# Patient Record
Sex: MALE | Race: WHITE | Employment: OTHER | URBAN - METROPOLITAN AREA
[De-identification: names, ages, dates, MRNs, and addresses within clinical notes are randomized per-mention and may not be internally consistent; named-entity substitution may affect disease eponyms.]

---

## 2017-04-17 ENCOUNTER — HOSPITAL ENCOUNTER (OUTPATIENT)
Dept: OTHER | Age: 72
Discharge: HOME | End: 2017-04-30
Attending: INTERNAL MEDICINE | Admitting: INTERNAL MEDICINE

## 2017-04-21 ENCOUNTER — HOSPITAL ENCOUNTER (OUTPATIENT)
Dept: PHYSICAL THERAPY | Age: 72
Discharge: HOME | End: 2017-04-22
Admitting: INTERNAL MEDICINE

## 2017-04-25 ENCOUNTER — HOSPITAL ENCOUNTER (OUTPATIENT)
Dept: PHYSICAL THERAPY | Age: 72
Discharge: HOME | End: 2017-04-26
Admitting: INTERNAL MEDICINE

## 2017-04-27 ENCOUNTER — HOSPITAL ENCOUNTER (OUTPATIENT)
Dept: PHYSICAL THERAPY | Age: 72
Discharge: HOME | End: 2017-04-28
Admitting: INTERNAL MEDICINE

## 2017-05-01 ENCOUNTER — HOSPITAL ENCOUNTER (OUTPATIENT)
Dept: OTHER | Age: 72
Discharge: HOME | End: 2017-05-19
Attending: INTERNAL MEDICINE | Admitting: INTERNAL MEDICINE

## 2017-05-03 ENCOUNTER — HOSPITAL ENCOUNTER (OUTPATIENT)
Dept: PHYSICAL THERAPY | Age: 72
Discharge: HOME | End: 2017-05-04
Admitting: INTERNAL MEDICINE

## 2017-05-05 ENCOUNTER — HOSPITAL ENCOUNTER (OUTPATIENT)
Dept: PHYSICAL THERAPY | Age: 72
Discharge: HOME | End: 2017-05-06
Admitting: INTERNAL MEDICINE

## 2017-05-09 ENCOUNTER — HOSPITAL ENCOUNTER (OUTPATIENT)
Dept: PHYSICAL THERAPY | Age: 72
Discharge: HOME | End: 2017-05-10
Admitting: INTERNAL MEDICINE

## 2017-05-11 ENCOUNTER — HOSPITAL ENCOUNTER (OUTPATIENT)
Dept: PHYSICAL THERAPY | Age: 72
Discharge: HOME | End: 2017-05-12
Admitting: INTERNAL MEDICINE

## 2017-05-20 ENCOUNTER — HOSPITAL ENCOUNTER (OUTPATIENT)
Dept: OTHER | Age: 72
Discharge: HOME | End: 2017-05-31
Attending: INTERNAL MEDICINE | Admitting: INTERNAL MEDICINE

## 2017-06-07 ENCOUNTER — HOSPITAL ENCOUNTER (OUTPATIENT)
Dept: PHYSICAL THERAPY | Age: 72
Discharge: HOME | End: 2017-06-08
Admitting: INTERNAL MEDICINE

## 2017-06-14 ENCOUNTER — HOSPITAL ENCOUNTER (OUTPATIENT)
Dept: PHYSICAL THERAPY | Age: 72
Discharge: HOME | End: 2017-06-15
Admitting: INTERNAL MEDICINE

## 2017-06-16 ENCOUNTER — HOSPITAL ENCOUNTER (OUTPATIENT)
Dept: PHYSICAL THERAPY | Age: 72
Discharge: HOME | End: 2017-06-17
Admitting: INTERNAL MEDICINE

## 2017-06-20 ENCOUNTER — HOSPITAL ENCOUNTER (OUTPATIENT)
Dept: PHYSICAL THERAPY | Age: 72
Discharge: HOME | End: 2017-06-21
Admitting: INTERNAL MEDICINE

## 2021-01-27 ENCOUNTER — IMMUNIZATION (OUTPATIENT)
Dept: PRIMARY CARE CLINIC | Age: 76
End: 2021-01-27
Payer: MEDICARE

## 2021-01-27 PROCEDURE — 0001A COVID-19, PFIZER VACCINE 30MCG/0.3ML DOSE: CPT | Performed by: FAMILY MEDICINE

## 2021-01-27 PROCEDURE — 91300 COVID-19, PFIZER VACCINE 30MCG/0.3ML DOSE: CPT | Performed by: FAMILY MEDICINE

## 2021-02-17 ENCOUNTER — IMMUNIZATION (OUTPATIENT)
Dept: PRIMARY CARE CLINIC | Age: 76
End: 2021-02-17
Payer: MEDICARE

## 2021-02-17 PROCEDURE — 0002A COVID-19, PFIZER VACCINE 30MCG/0.3ML DOSE: CPT | Performed by: FAMILY MEDICINE

## 2021-02-17 PROCEDURE — 91300 COVID-19, PFIZER VACCINE 30MCG/0.3ML DOSE: CPT | Performed by: FAMILY MEDICINE

## 2021-08-04 ENCOUNTER — APPOINTMENT (OUTPATIENT)
Dept: CT IMAGING | Age: 76
End: 2021-08-04
Payer: MEDICARE

## 2021-08-04 ENCOUNTER — HOSPITAL ENCOUNTER (EMERGENCY)
Age: 76
Discharge: ANOTHER HOSPITAL | End: 2021-08-05
Attending: EMERGENCY MEDICINE
Payer: MEDICARE

## 2021-08-04 VITALS
HEIGHT: 70 IN | SYSTOLIC BLOOD PRESSURE: 136 MMHG | OXYGEN SATURATION: 99 % | HEART RATE: 87 BPM | WEIGHT: 211.86 LBS | RESPIRATION RATE: 17 BRPM | TEMPERATURE: 98.1 F | DIASTOLIC BLOOD PRESSURE: 82 MMHG | BODY MASS INDEX: 30.33 KG/M2

## 2021-08-04 DIAGNOSIS — S06.5XAA SUBDURAL HEMATOMA: Primary | ICD-10-CM

## 2021-08-04 LAB
A/G RATIO: 1.2 (ref 1.1–2.2)
ALBUMIN SERPL-MCNC: 3.6 G/DL (ref 3.4–5)
ALP BLD-CCNC: 128 U/L (ref 40–129)
ALT SERPL-CCNC: 12 U/L (ref 10–40)
ANION GAP SERPL CALCULATED.3IONS-SCNC: 13 MMOL/L (ref 3–16)
APTT: 36.7 SEC (ref 26.2–38.6)
AST SERPL-CCNC: 15 U/L (ref 15–37)
BASOPHILS ABSOLUTE: 0 K/UL (ref 0–0.2)
BASOPHILS RELATIVE PERCENT: 0.6 %
BILIRUB SERPL-MCNC: 0.6 MG/DL (ref 0–1)
BUN BLDV-MCNC: 15 MG/DL (ref 7–20)
CALCIUM SERPL-MCNC: 8.8 MG/DL (ref 8.3–10.6)
CHLORIDE BLD-SCNC: 99 MMOL/L (ref 99–110)
CO2: 25 MMOL/L (ref 21–32)
CREAT SERPL-MCNC: 1.2 MG/DL (ref 0.8–1.3)
EOSINOPHILS ABSOLUTE: 0 K/UL (ref 0–0.6)
EOSINOPHILS RELATIVE PERCENT: 0.3 %
GFR AFRICAN AMERICAN: >60
GFR NON-AFRICAN AMERICAN: 59
GLOBULIN: 3 G/DL
GLUCOSE BLD-MCNC: 183 MG/DL (ref 70–99)
GLUCOSE BLD-MCNC: 197 MG/DL (ref 70–99)
HCT VFR BLD CALC: 39.1 % (ref 40.5–52.5)
HEMOGLOBIN: 12.7 G/DL (ref 13.5–17.5)
INR BLD: 1.44 (ref 0.88–1.12)
LYMPHOCYTES ABSOLUTE: 1.2 K/UL (ref 1–5.1)
LYMPHOCYTES RELATIVE PERCENT: 14.6 %
MCH RBC QN AUTO: 26.8 PG (ref 26–34)
MCHC RBC AUTO-ENTMCNC: 32.4 G/DL (ref 31–36)
MCV RBC AUTO: 83 FL (ref 80–100)
MONOCYTES ABSOLUTE: 0.4 K/UL (ref 0–1.3)
MONOCYTES RELATIVE PERCENT: 5.3 %
NEUTROPHILS ABSOLUTE: 6.4 K/UL (ref 1.7–7.7)
NEUTROPHILS RELATIVE PERCENT: 79.2 %
PDW BLD-RTO: 14.6 % (ref 12.4–15.4)
PERFORMED ON: ABNORMAL
PLATELET # BLD: 142 K/UL (ref 135–450)
PMV BLD AUTO: 8.9 FL (ref 5–10.5)
POTASSIUM REFLEX MAGNESIUM: 4.6 MMOL/L (ref 3.5–5.1)
PROTHROMBIN TIME: 16.5 SEC (ref 9.9–12.7)
RBC # BLD: 4.72 M/UL (ref 4.2–5.9)
SODIUM BLD-SCNC: 137 MMOL/L (ref 136–145)
TOTAL PROTEIN: 6.6 G/DL (ref 6.4–8.2)
WBC # BLD: 8.1 K/UL (ref 4–11)

## 2021-08-04 PROCEDURE — 96375 TX/PRO/DX INJ NEW DRUG ADDON: CPT

## 2021-08-04 PROCEDURE — 93005 ELECTROCARDIOGRAM TRACING: CPT | Performed by: EMERGENCY MEDICINE

## 2021-08-04 PROCEDURE — 2580000003 HC RX 258: Performed by: EMERGENCY MEDICINE

## 2021-08-04 PROCEDURE — 70450 CT HEAD/BRAIN W/O DYE: CPT

## 2021-08-04 PROCEDURE — 80053 COMPREHEN METABOLIC PANEL: CPT

## 2021-08-04 PROCEDURE — 99285 EMERGENCY DEPT VISIT HI MDM: CPT

## 2021-08-04 PROCEDURE — 85025 COMPLETE CBC W/AUTO DIFF WBC: CPT

## 2021-08-04 PROCEDURE — 85730 THROMBOPLASTIN TIME PARTIAL: CPT

## 2021-08-04 PROCEDURE — 96365 THER/PROPH/DIAG IV INF INIT: CPT

## 2021-08-04 PROCEDURE — 6360000002 HC RX W HCPCS: Performed by: EMERGENCY MEDICINE

## 2021-08-04 PROCEDURE — 85610 PROTHROMBIN TIME: CPT

## 2021-08-04 RX ORDER — LISINOPRIL 10 MG/1
10 TABLET ORAL DAILY
Status: ON HOLD | COMMUNITY
Start: 2021-03-15 | End: 2021-08-10 | Stop reason: HOSPADM

## 2021-08-04 RX ORDER — TAMSULOSIN HYDROCHLORIDE 0.4 MG/1
0.4 CAPSULE ORAL NIGHTLY
COMMUNITY
Start: 2021-05-17

## 2021-08-04 RX ORDER — CARBOXYMETHYLCELLULOSE SODIUM 10 MG/ML
1 GEL OPHTHALMIC 3 TIMES DAILY PRN
COMMUNITY
Start: 2021-02-04

## 2021-08-04 RX ORDER — SODIUM CHLORIDE 9 MG/ML
50 INJECTION, SOLUTION INTRAVENOUS ONCE
Status: DISCONTINUED | OUTPATIENT
Start: 2021-08-04 | End: 2021-08-05 | Stop reason: HOSPADM

## 2021-08-04 RX ORDER — INSULIN GLARGINE 100 [IU]/ML
14 INJECTION, SOLUTION SUBCUTANEOUS NIGHTLY
COMMUNITY

## 2021-08-04 RX ADMIN — LEVETIRACETAM 1500 MG: 100 INJECTION, SOLUTION INTRAVENOUS at 15:13

## 2021-08-04 RX ADMIN — Medication 4776 UNITS: at 15:35

## 2021-08-04 ASSESSMENT — PAIN SCALES - GENERAL
PAINLEVEL_OUTOF10: 0

## 2021-08-04 NOTE — ED NOTES
Pt to ED via [facility] with slurred speech and left side facial droop. Per EMS report, pt was last seen normal at 1330 today. EMS state pt had a seizure that lasted approx 2 min prior to their arrival, witnessed by wife. ED MD Hans Rubin at bedside. Code stroke activated.        Augie Penn, RN  08/04/21 [facility], RN  08/04/21 3814

## 2021-08-04 NOTE — ED NOTES
Attempted to notify wife Matthew Ulrich, at this time with patient's approval. Patient's wife unable to be reached.      Elias Trujillo, MERCEDES  08/04/21 8978

## 2021-08-04 NOTE — ED NOTES
Report given to receiving MERCEDES Blanton, all questions answered.       Carolynn Rock RN  08/04/21 5062

## 2021-08-04 NOTE — PLAN OF CARE
NEUROSURGERY PROGRESS NOTE    Araeclis Umaña  [id number]   1945 8/4/2021    ED physician: Dr. Lois Cardona MD    Reason for call: SDH    History of present illness: Patient is a 77 y.o. male that  has a past medical history of Arthritis, Brain tumor ([address] (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 Patient presented on 8/4/2021 to [facility] ED c/o headache. According to Dr. Jeffrey Mercer, the patient is a 71-year-old male with history of brain tumor, diabetes, DVT, hypertension who presents for headache, seizure, facial droop and dysarthria which began approximately 45 minutes prior to arrival.  Patient states he woke up after a nap around 145 with a headache. Called EMS and had one seizure prior to EMS arrival.  No history of seizures. Not on any antiepileptics. 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 Patient does report that he takes Eliquis. Has not missed any doses. Patient has fallen mechanically several times last one was 3 to 4 days ago without loss of consciousness or any other injury. Review of systems unremarkable at this time. Denies any fever, nausea, vomiting, diarrhea, chest pain, shortness of breath, dysuria, hematuria, back pain. Physical Exam:     /85   Pulse 75   Temp 98.5 °F (36.9 °C) (Oral)   Resp 14   Ht 5' 10\" (1.778 m)   Wt 211 lb 13.8 oz (96.1 kg)   SpO2 96%   BMI 30.40 kg/m²     GCS per ED physician:  4 - Opens eyes on own  5 - Alert and oriented  6 - Follows simple motor commands    Full left-sided facial droop, dysarthria, and left finger-to-nose with dysmetria    Radiological Findings:  CT HEAD WO CONTRAST  Result Date: 8/4/2021  Large right subdural hematoma with mixed attenuation suggesting acute on chronic or active bleeding. There is 7 mm right to left midline shift.  Remote left cerebellar infarct. Assessment:  Patient is a 77 y.o. y/o male w/headache 2/2 acute on chronic SDH    Recommendations:  1. Neurologic exams frequency:  - ICU: Q1H  - Floor: Q4H, if repeat CT Head stable  2. For change in exam MUST contact neurosurgery team  3. CT Head w/o contrast 6 hours from previous scan  4. Maintain SBP <160; If PRN med insufficient, then may start Nicardipine infusion  5. Keep Plt >100k & INR <1.4  6. Hold all anticoagulation & antiplatelet for 2 weeks  7. DVT Prophylaxis: SCD's  8. Seizure prophylaxis: Keppra 1000 mg BID  9. Detailed consult when patient arrives. DISPO: Transfer to [facility] with hospitalist as attending physician and Dr. Patricio Darling as consulting neurosurgeon.      Electronically signed by: KRISTAL Denson CNP, APRN-CNP, 8/4/2021 4:06 PM  [phone]

## 2021-08-04 NOTE — ED PROVIDER NOTES
[address] [facility] [place]      Pt Name: Armand Nelson  MRN: [id number]  [place] 1945  Date ofevaluation: 8/4/2021  Provider: Sofía Talbert MD    CHIEF COMPLAINT       Chief Complaint   Patient presents with    Facial Droop     Pt to ED via [facility] with slurred speech and left side facial droop. Per EMS report, pt was last seen normal at 1330 today. EMS state pt had a seizure that lasted approx 2 min prior to their arrival, witnessed by wife. HISTORY OF PRESENT ILLNESS   (Location/Symptom, Timing/Onset,Context/Setting, Quality, Duration, Modifying Factors, Severity)  Note limiting factors. Armand Nelson is a 77 y.o. male  who  has a past medical history of Arthritis, Brain tumor [address] [address] Cancer ([facility] Deaf, Diabetes ([facility] [address] [facility] Diabetes mellitus ([facility] [address] [facility] Endocarditis, High blood pressure, and Peptic ulcer. 76-year-old male with history of brain tumor, diabetes, DVT, hypertension who presents for headache, seizure, facial droop and dysarthria which began approximately 45 minutes prior to arrival.  Patient states he woke up after a nap around 145 with a headache. Called EMS and had one seizure prior to EMS arrival.  No history of seizures. Not on any antiepileptics. 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 Patient does report that he takes Eliquis. Has not missed any doses. Patient has fallen mechanically several times last one was 3 to 4 days ago without loss of consciousness or any other injury. Review of systems unremarkable at this time. Denies any fever, nausea, vomiting, diarrhea, chest pain, shortness of breath, dysuria, hematuria, back pain. The history is provided by the patient, the EMS personnel, the spouse and a relative. Neurologic Problem  Primary symptoms include speech change. Primary symptoms comment: Seizure.  This is a new problem. The current episode started less than 1 hour ago. The problem has not changed since onset. There was left facial focality noted. There has been no fever. Associated symptoms include headaches. There were no medications administered prior to arrival. Associated medical issues include seizures. NursingNotes were reviewed. REVIEW OF SYSTEMS    (2-9 systems for level 4, 10 or more for level 5)     Review of Systems   Neurological: Positive for speech change and headaches. Except as noted above the remainder of the review of systems was reviewed and negative. 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    Diabetes ([address]     Diabetes mellitus ([address]     Endocarditis late 1990's    High blood pressure     Peptic ulcer 2013    stopped aspirin         SURGICALHISTORY       Past Surgical History:   Procedure Laterality Date    APPENDECTOMY  age 3    CARDIAC SURGERY      [facility]    [facility]    UPPER GASTROINTESTINAL ENDOSCOPY  2013         CURRENT MEDICATIONS       Previous Medications    APIXABAN (ELIQUIS) 5 MG TABS TABLET    Take 5 mg by mouth 2 times daily    CARBOXYMETHYLCELLULOSE (THERATEARS) 1 % OPHTHALMIC GEL    Place 1 drop into the left eye 3 times daily as needed for Dry Eyes    DICYCLOMINE (BENTYL) 10 MG CAPSULE    Take 10 mg by mouth 4 times daily (before meals and nightly)     FUROSEMIDE (LASIX) 20 MG TABLET    Take 20 mg by mouth daily. INSULIN GLARGINE (LANTUS) 100 UNIT/ML INJECTION VIAL    Inject 14 Units into the skin nightly    LISINOPRIL (PRINIVIL;ZESTRIL) 10 MG TABLET    Take 10 mg by mouth daily    OMEPRAZOLE (PRILOSEC) 40 MG CAPSULE    Take 40 mg by mouth daily.     PRAVASTATIN (PRAVACHOL) 80 MG TABLET    Take 40 mg by mouth nightly     TAMSULOSIN (FLOMAX) 0.4 MG CAPSULE    Take 0.4 mg by mouth nightly            Iv contrast [iodides] and Sulfa antibiotics    FAMILY HISTORY       Family History   Problem Relation Age of Onset    Diabetes Mother     Cancer Father         bone    Arthritis Other     Diabetes Other     High Blood Pressure Other           SOCIAL HISTORY       Social History     Socioeconomic History    Marital status:      Spouse name: None    Number of children: None    Years of education: None    Highest education level: None   Occupational History    None   Tobacco Use    Smoking status: Former Smoker     Years: 2.00     Quit date: 1960     Years since quittin.3    Smokeless tobacco: Never Used   Substance and Sexual Activity    Alcohol use: Yes     Comment: [place]    Drug use: No    Sexual activity: Not Currently   Other Topics Concern    None   Social History Narrative    None     Social Determinants of Health     Financial Resource Strain:     Difficulty of Paying Living Expenses:    Food Insecurity:     Worried About Running Out of Food in the Last Year:     Ran Out of Food in the Last Year:    Transportation Needs:     Lack of Transportation (Medical):      Lack of Transportation (Non-Medical):    Physical Activity:     Days of Exercise per Week:     Minutes of Exercise per Session:    Stress:     Feeling of Stress :    Social Connections:     Frequency of Communication with Friends and Family:     Frequency of Social Gatherings with Friends and Family:     Attends Temple Services:     Active Member of Clubs or Organizations:     Attends Club or Organization Meetings:     Marital Status:    Intimate Partner Violence:     Fear of Current or Ex-Partner:     Emotionally Abused:     Physically Abused:     Sexually Abused:        SCREENINGS   NIH Stroke Scale  Interval: Baseline  Level of Consciousness (1a. ): Alert  LOC Questions (1b. ): Answers one correctly  LOC Commands (1c. ): Performs both tasks correctly  Best Gaze (2. ): (!) Partial gaze palsy  Visual (3. ): No visual loss  Facial Palsy (4. ): (!) Complete paralysis of one or both sides  Motor Arm, Left (5a. ): No drift  Motor Arm, Right (5b. ): No drift  Motor Leg, Left (6a. ): No drift  Motor Leg, Right (6b. ): No drift  Limb Ataxia (7. ): Absent  Sensory (8. ): Normal  Best Language (9. ): No aphasia  Dysarthria (10. ): Severe, unintelligible slurring or mute  Extinction and Inattention (11): No abnormality  Total: 7Glasgow Coma Scale  Eye Opening: Spontaneous  Best Verbal Response: Oriented  Best Motor Response: Obeys commands  Mustang Coma Scale Score: 15        PHYSICAL EXAM    (up to 7 for level 4, 8 or more for level 5)     ED Triage Vitals [08/04/21 1437]   BP Temp Temp Source Pulse Resp SpO2 Height Weight   (!) 149/90 98.5 °F (36.9 °C) Oral 86 19 93 % 5' 10\" (1.778 m) 211 lb 13.8 oz (96.1 kg)       Physical Exam  Vitals and nursing note reviewed. Constitutional:       General: He is not in acute distress. Appearance: He is well-developed and normal weight. He is not ill-appearing, toxic-appearing or diaphoretic. HENT:      Head: Normocephalic and atraumatic. Mouth/Throat:      Mouth: Mucous membranes are moist.      Pharynx: Oropharynx is clear. Eyes:      Extraocular Movements: Extraocular movements intact. Cardiovascular:      Rate and Rhythm: Normal rate and regular rhythm. Pulses: Normal pulses. Heart sounds: Normal heart sounds. Pulmonary:      Effort: Pulmonary effort is normal.      Breath sounds: Normal breath sounds. No decreased breath sounds, wheezing, rhonchi or rales. Chest:      Chest wall: No tenderness. Abdominal:      General: Bowel sounds are normal.      Palpations: Abdomen is soft. Tenderness: There is no abdominal tenderness. Musculoskeletal:         General: Normal range of motion.       Cervical back: Normal range of midline shift. Remote left cerebellar infarct. Findings were discussed with Consuelo Smith at 2:43 pm on 8/4/2021. ED BEDSIDE ULTRASOUND:   Performed by ED Physician - none    LABS:  Labs Reviewed   CBC WITH AUTO DIFFERENTIAL - Abnormal; Notable for the following components:       Result Value    Hemoglobin 12.7 (*)     Hematocrit 39.1 (*)     All other components within normal limits    Narrative:     Performed at:  [facility]  [address] [facility] [address]   Phone [phone]   COMPREHENSIVE METABOLIC PANEL W/ REFLEX TO MG FOR LOW K - Abnormal; Notable for the following components:    Glucose 197 (*)     GFR Non- 59 (*)     All other components within normal limits    Narrative:     Performed at:  [facility]  [address] [facility] [address]   Phone [phone]   PROTIME-INR - Abnormal; Notable for the following components:    Protime 16.5 (*)     INR 1.44 (*)     All other components within normal limits    Narrative:     Performed at:  [address] [facility] [address]   Phone [phone]   POCT GLUCOSE - Abnormal; Notable for the following components:    POC Glucose 183 (*)     All other components within normal limits    Narrative:     Performed at:  [facility]  [address] [facility] [address]   Phone [phone]   APTT    Narrative:     Performed at:  [address] [place] [address] Laboratory  [address] [place] [address] [facility]   Phone [phone]       All other labs were within normal range or not returned as of this dictation.     EMERGENCY DEPARTMENT COURSE and DIFFERENTIAL DIAGNOSIS/MDM:   Vitals:    Vitals:    08/04/21 1437 08/04/21 1446 08/04/21 1502 08/04/21 1532   BP: (!) 149/90 (!) 145/89 136/87 132/85   Pulse: 86 80 78 75   Resp: 19 26 12 14   Temp: treatment included: See above  . INITIAL NIH STROKE SCALE    Time Performed: 2:30 PM  Administer stroke scale items in the order listed. Record performance in each category after each subscale exam. Do not go back and change scores. Follow directions provided for each exam technique. Scores should reflect what the patient does, not what the clinician thinks the patient can do. The clinician should record answers while administering the exam and work quickly. Except where indicated, the patient should not be coached (i.e., repeated requests to patient to make a special effort). 1a.  Level of consciousness:  0 - alert; keenly responsive  1b. Level of consciousness questions:  1 - answers one question correctly  1c. Level of consciousness questions:  0 - performs both tasks correctly  2. Best Gaze:  1 - partial gaze palsy  3. Visual:  0 - no visual loss  4. Facial Palsy:  2 - partial paralysis (total or near total paralysis of the lower face)  5a. Motor left arm:  0 - no drift, limb holds 90 (or 45) degrees for full 10 seconds  5b. Motor right arm:  0 - no drift, limb holds 90 (or 45) degrees for full 10 seconds  6a. Motor left le - no drift; leg holds 30 degree position for full 5 seconds  6b. Motor right le - no drift; leg holds 30 degree position for full 5 seconds  7. Limb Ataxia:  1 - present in one limb  8. Sensory:  0 - normal; no sensory loss  9. Best Language:  0  10. Dysarthria:  2 - severe; patient speech is so slurred as to be unintelligible in the absence of or our of proportion to any dysphagia, or is mute/anarthric   11. Extinction and Inattention:  0 - no abnormality    TOTAL:  8      REASSESSMENT       2:30 PM  Patient arrived with facial droop and headache. Rushed to CT scanner    2:40 PM  Seen subdural hematoma. Spoke with Dr. Addie Osler at [facility] stroke team.  Patient not a TPA candidate at this time given acute head bleed.   Will consult neurosurgical team.  Patient takes Eliquis will order Kcentra as well as Keppra. His bed to be maintained at 30 degrees. Patient's blood pressure at this time nonconcerning but will maintain below 845 systolic and give nicardipine if necessary. 3:18 PM  [facility] team.  Patient excepted for transfer again agree with recommendations for reversal, blood pressure control. He also recommends repeat head CT in 6 hours if patient still in our ER or emergently with any clinical change. 3:40 PM  [facility] who [facility]. Will speak with ICU team and arrange transport. 3:47 PM  Neuro status unchanged. CRITICAL CARE TIME   Total Critical Care time was 20 minutes, excluding separately reportable procedures. There was a high probability of clinically significant/life threatening deterioration in the patient's condition which required my urgent intervention. CONSULTS:  IP CONSULT TO NEUROSURGERY  IP CONSULT TO STROKE TEAM    PROCEDURES:  Unless otherwise noted below, none     Procedures    FINAL IMPRESSION      1. Subdural hematoma ([address]          DISPOSITION/PLAN   DISPOSITION    Transfer to outside hospital    PATIENT REFERREDTO:  No follow-up provider specified.     DISCHARGEMEDICATIONS:  New Prescriptions    No medications on file          (Please note that portions of this note were completed with a voice recognition program.  Efforts were made to edit the dictations but occasionally words are mis-transcribed.)    Dakota Taylor MD (electronically signed)  Attending Emergency Physician         Dakota Taylor MD  08/04/21 9581

## 2021-08-05 ENCOUNTER — APPOINTMENT (OUTPATIENT)
Dept: CT IMAGING | Age: 76
DRG: 025 | End: 2021-08-05
Attending: INTERNAL MEDICINE
Payer: MEDICARE

## 2021-08-05 ENCOUNTER — HOSPITAL ENCOUNTER (INPATIENT)
Age: 76
LOS: 6 days | Discharge: NURSING, REHAB OR LONG-TERM CARE | DRG: 025 | End: 2021-08-11
Attending: INTERNAL MEDICINE | Admitting: INTERNAL MEDICINE
Payer: MEDICARE

## 2021-08-05 ENCOUNTER — ANESTHESIA EVENT (OUTPATIENT)
Dept: OPERATING ROOM | Age: 76
DRG: 025 | End: 2021-08-05
Payer: MEDICARE

## 2021-08-05 ENCOUNTER — ANESTHESIA (OUTPATIENT)
Dept: OPERATING ROOM | Age: 76
DRG: 025 | End: 2021-08-05
Payer: MEDICARE

## 2021-08-05 VITALS — TEMPERATURE: 86.9 F | DIASTOLIC BLOOD PRESSURE: 66 MMHG | SYSTOLIC BLOOD PRESSURE: 127 MMHG | OXYGEN SATURATION: 100 %

## 2021-08-05 LAB
ABO/RH: NORMAL
ANION GAP SERPL CALCULATED.3IONS-SCNC: 9 MMOL/L (ref 3–16)
ANTIBODY SCREEN: NORMAL
BUN BLDV-MCNC: 13 MG/DL (ref 7–20)
CALCIUM SERPL-MCNC: 9.1 MG/DL (ref 8.3–10.6)
CHLORIDE BLD-SCNC: 105 MMOL/L (ref 99–110)
CHOLESTEROL, TOTAL: 123 MG/DL (ref 0–199)
CO2: 27 MMOL/L (ref 21–32)
CREAT SERPL-MCNC: 1 MG/DL (ref 0.8–1.3)
EKG ATRIAL RATE: 394 BPM
EKG DIAGNOSIS: NORMAL
EKG Q-T INTERVAL: 382 MS
EKG QRS DURATION: 78 MS
EKG QTC CALCULATION (BAZETT): 443 MS
EKG R AXIS: -15 DEGREES
EKG T AXIS: 29 DEGREES
EKG VENTRICULAR RATE: 81 BPM
GFR AFRICAN AMERICAN: >60
GFR NON-AFRICAN AMERICAN: >60
GLUCOSE BLD-MCNC: 104 MG/DL (ref 70–99)
GLUCOSE BLD-MCNC: 124 MG/DL (ref 70–99)
GLUCOSE BLD-MCNC: 127 MG/DL (ref 70–99)
GLUCOSE BLD-MCNC: 145 MG/DL (ref 70–99)
GLUCOSE BLD-MCNC: 146 MG/DL (ref 70–99)
HCT VFR BLD CALC: 38.7 % (ref 40.5–52.5)
HDLC SERPL-MCNC: 37 MG/DL (ref 40–60)
HEMOGLOBIN: 12.7 G/DL (ref 13.5–17.5)
INR BLD: 1.28 (ref 0.88–1.12)
LDL CHOLESTEROL CALCULATED: 73 MG/DL
MCH RBC QN AUTO: 27.7 PG (ref 26–34)
MCHC RBC AUTO-ENTMCNC: 32.8 G/DL (ref 31–36)
MCV RBC AUTO: 84.6 FL (ref 80–100)
PDW BLD-RTO: 14.7 % (ref 12.4–15.4)
PERFORMED ON: ABNORMAL
PLATELET # BLD: 122 K/UL (ref 135–450)
PMV BLD AUTO: 8.6 FL (ref 5–10.5)
POTASSIUM SERPL-SCNC: 4.6 MMOL/L (ref 3.5–5.1)
PROTHROMBIN TIME: 14.6 SEC (ref 9.9–12.7)
RBC # BLD: 4.57 M/UL (ref 4.2–5.9)
REASON FOR REJECTION: NORMAL
REJECTED TEST: NORMAL
SODIUM BLD-SCNC: 141 MMOL/L (ref 136–145)
TRIGL SERPL-MCNC: 65 MG/DL (ref 0–150)
VLDLC SERPL CALC-MCNC: 13 MG/DL
WBC # BLD: 7.9 K/UL (ref 4–11)

## 2021-08-05 PROCEDURE — 93010 ELECTROCARDIOGRAM REPORT: CPT | Performed by: INTERNAL MEDICINE

## 2021-08-05 PROCEDURE — 80061 LIPID PANEL: CPT

## 2021-08-05 PROCEDURE — 6370000000 HC RX 637 (ALT 250 FOR IP): Performed by: PHYSICIAN ASSISTANT

## 2021-08-05 PROCEDURE — 2720000010 HC SURG SUPPLY STERILE: Performed by: NEUROLOGICAL SURGERY

## 2021-08-05 PROCEDURE — 80048 BASIC METABOLIC PNL TOTAL CA: CPT

## 2021-08-05 PROCEDURE — 86901 BLOOD TYPING SEROLOGIC RH(D): CPT

## 2021-08-05 PROCEDURE — 2500000003 HC RX 250 WO HCPCS: Performed by: NURSE ANESTHETIST, CERTIFIED REGISTERED

## 2021-08-05 PROCEDURE — 6360000002 HC RX W HCPCS: Performed by: PHYSICIAN ASSISTANT

## 2021-08-05 PROCEDURE — 00P60JZ REMOVAL OF SYNTHETIC SUBSTITUTE FROM CEREBRAL VENTRICLE, OPEN APPROACH: ICD-10-PCS | Performed by: NEUROLOGICAL SURGERY

## 2021-08-05 PROCEDURE — 2780000010 HC IMPLANT OTHER: Performed by: NEUROLOGICAL SURGERY

## 2021-08-05 PROCEDURE — C1713 ANCHOR/SCREW BN/BN,TIS/BN: HCPCS | Performed by: NEUROLOGICAL SURGERY

## 2021-08-05 PROCEDURE — 6360000002 HC RX W HCPCS: Performed by: NEUROLOGICAL SURGERY

## 2021-08-05 PROCEDURE — 6370000000 HC RX 637 (ALT 250 FOR IP): Performed by: INTERNAL MEDICINE

## 2021-08-05 PROCEDURE — 3600000014 HC SURGERY LEVEL 4 ADDTL 15MIN: Performed by: NEUROLOGICAL SURGERY

## 2021-08-05 PROCEDURE — 70450 CT HEAD/BRAIN W/O DYE: CPT

## 2021-08-05 PROCEDURE — 86900 BLOOD TYPING SEROLOGIC ABO: CPT

## 2021-08-05 PROCEDURE — 00160J6 BYPASS CEREBRAL VENTRICLE TO PERITONEAL CAVITY WITH SYNTHETIC SUBSTITUTE, OPEN APPROACH: ICD-10-PCS | Performed by: NEUROLOGICAL SURGERY

## 2021-08-05 PROCEDURE — 2580000003 HC RX 258: Performed by: NEUROLOGICAL SURGERY

## 2021-08-05 PROCEDURE — 83036 HEMOGLOBIN GLYCOSYLATED A1C: CPT

## 2021-08-05 PROCEDURE — 2709999900 HC NON-CHARGEABLE SUPPLY: Performed by: NEUROLOGICAL SURGERY

## 2021-08-05 PROCEDURE — 2580000003 HC RX 258: Performed by: ANESTHESIOLOGY

## 2021-08-05 PROCEDURE — 36415 COLL VENOUS BLD VENIPUNCTURE: CPT

## 2021-08-05 PROCEDURE — 6370000000 HC RX 637 (ALT 250 FOR IP): Performed by: STUDENT IN AN ORGANIZED HEALTH CARE EDUCATION/TRAINING PROGRAM

## 2021-08-05 PROCEDURE — 3700000000 HC ANESTHESIA ATTENDED CARE: Performed by: NEUROLOGICAL SURGERY

## 2021-08-05 PROCEDURE — 85610 PROTHROMBIN TIME: CPT

## 2021-08-05 PROCEDURE — 3600000004 HC SURGERY LEVEL 4 BASE: Performed by: NEUROLOGICAL SURGERY

## 2021-08-05 PROCEDURE — 2580000003 HC RX 258: Performed by: PHYSICIAN ASSISTANT

## 2021-08-05 PROCEDURE — 99223 1ST HOSP IP/OBS HIGH 75: CPT | Performed by: INTERNAL MEDICINE

## 2021-08-05 PROCEDURE — 6370000000 HC RX 637 (ALT 250 FOR IP): Performed by: NEUROLOGICAL SURGERY

## 2021-08-05 PROCEDURE — 94761 N-INVAS EAR/PLS OXIMETRY MLT: CPT

## 2021-08-05 PROCEDURE — 2000000000 HC ICU R&B

## 2021-08-05 PROCEDURE — 2500000003 HC RX 250 WO HCPCS: Performed by: ANESTHESIOLOGY

## 2021-08-05 PROCEDURE — 00C40ZZ EXTIRPATION OF MATTER FROM INTRACRANIAL SUBDURAL SPACE, OPEN APPROACH: ICD-10-PCS | Performed by: NEUROLOGICAL SURGERY

## 2021-08-05 PROCEDURE — 6360000002 HC RX W HCPCS: Performed by: NURSE ANESTHETIST, CERTIFIED REGISTERED

## 2021-08-05 PROCEDURE — 3700000001 HC ADD 15 MINUTES (ANESTHESIA): Performed by: NEUROLOGICAL SURGERY

## 2021-08-05 PROCEDURE — 6360000002 HC RX W HCPCS: Performed by: ANESTHESIOLOGY

## 2021-08-05 PROCEDURE — 86850 RBC ANTIBODY SCREEN: CPT

## 2021-08-05 PROCEDURE — 6360000002 HC RX W HCPCS: Performed by: STUDENT IN AN ORGANIZED HEALTH CARE EDUCATION/TRAINING PROGRAM

## 2021-08-05 PROCEDURE — 2500000003 HC RX 250 WO HCPCS: Performed by: NEUROLOGICAL SURGERY

## 2021-08-05 PROCEDURE — 2700000000 HC OXYGEN THERAPY PER DAY

## 2021-08-05 PROCEDURE — 85027 COMPLETE CBC AUTOMATED: CPT

## 2021-08-05 PROCEDURE — 2580000003 HC RX 258: Performed by: STUDENT IN AN ORGANIZED HEALTH CARE EDUCATION/TRAINING PROGRAM

## 2021-08-05 DEVICE — STRAIGHT PLATE, 16MM BAR
Type: IMPLANTABLE DEVICE | Site: CRANIAL | Status: FUNCTIONAL
Brand: UNIVERSAL NEURO 3

## 2021-08-05 DEVICE — SCREW UN3 SLFTP 1.5X4MM: Type: IMPLANTABLE DEVICE | Site: CRANIAL | Status: FUNCTIONAL

## 2021-08-05 RX ORDER — PROPOFOL 10 MG/ML
INJECTION, EMULSION INTRAVENOUS PRN
Status: DISCONTINUED | OUTPATIENT
Start: 2021-08-05 | End: 2021-08-05 | Stop reason: SDUPTHER

## 2021-08-05 RX ORDER — NICOTINE POLACRILEX 4 MG
15 LOZENGE BUCCAL PRN
Status: DISCONTINUED | OUTPATIENT
Start: 2021-08-05 | End: 2021-08-11 | Stop reason: HOSPADM

## 2021-08-05 RX ORDER — SODIUM CHLORIDE 9 MG/ML
25 INJECTION, SOLUTION INTRAVENOUS PRN
Status: DISCONTINUED | OUTPATIENT
Start: 2021-08-05 | End: 2021-08-11 | Stop reason: HOSPADM

## 2021-08-05 RX ORDER — ROSUVASTATIN CALCIUM 20 MG/1
40 TABLET, COATED ORAL NIGHTLY
Status: DISCONTINUED | OUTPATIENT
Start: 2021-08-05 | End: 2021-08-05

## 2021-08-05 RX ORDER — LABETALOL HYDROCHLORIDE 5 MG/ML
10 INJECTION, SOLUTION INTRAVENOUS EVERY 10 MIN PRN
Status: DISCONTINUED | OUTPATIENT
Start: 2021-08-05 | End: 2021-08-11 | Stop reason: HOSPADM

## 2021-08-05 RX ORDER — PROMETHAZINE HYDROCHLORIDE 25 MG/1
12.5 TABLET ORAL EVERY 6 HOURS PRN
Status: DISCONTINUED | OUTPATIENT
Start: 2021-08-05 | End: 2021-08-11 | Stop reason: HOSPADM

## 2021-08-05 RX ORDER — FUROSEMIDE 20 MG/1
20 TABLET ORAL 4 TIMES DAILY PRN
Status: DISCONTINUED | OUTPATIENT
Start: 2021-08-05 | End: 2021-08-11 | Stop reason: HOSPADM

## 2021-08-05 RX ORDER — OXYCODONE HYDROCHLORIDE 5 MG/1
5 TABLET ORAL EVERY 4 HOURS PRN
Status: DISCONTINUED | OUTPATIENT
Start: 2021-08-05 | End: 2021-08-11 | Stop reason: HOSPADM

## 2021-08-05 RX ORDER — ONDANSETRON 4 MG/1
4 TABLET, ORALLY DISINTEGRATING ORAL EVERY 8 HOURS PRN
Status: DISCONTINUED | OUTPATIENT
Start: 2021-08-05 | End: 2021-08-11 | Stop reason: HOSPADM

## 2021-08-05 RX ORDER — CEFAZOLIN SODIUM 1 G/3ML
INJECTION, POWDER, FOR SOLUTION INTRAMUSCULAR; INTRAVENOUS PRN
Status: DISCONTINUED | OUTPATIENT
Start: 2021-08-05 | End: 2021-08-05 | Stop reason: SDUPTHER

## 2021-08-05 RX ORDER — ONDANSETRON 2 MG/ML
INJECTION INTRAMUSCULAR; INTRAVENOUS PRN
Status: DISCONTINUED | OUTPATIENT
Start: 2021-08-05 | End: 2021-08-05 | Stop reason: SDUPTHER

## 2021-08-05 RX ORDER — SODIUM CHLORIDE 9 MG/ML
INJECTION, SOLUTION INTRAVENOUS CONTINUOUS
Status: DISCONTINUED | OUTPATIENT
Start: 2021-08-05 | End: 2021-08-06

## 2021-08-05 RX ORDER — SODIUM CHLORIDE 9 MG/ML
INJECTION, SOLUTION INTRAVENOUS CONTINUOUS PRN
Status: DISCONTINUED | OUTPATIENT
Start: 2021-08-05 | End: 2021-08-05 | Stop reason: SDUPTHER

## 2021-08-05 RX ORDER — TAMSULOSIN HYDROCHLORIDE 0.4 MG/1
0.4 CAPSULE ORAL NIGHTLY
Status: DISCONTINUED | OUTPATIENT
Start: 2021-08-05 | End: 2021-08-11 | Stop reason: HOSPADM

## 2021-08-05 RX ORDER — LISINOPRIL 10 MG/1
10 TABLET ORAL DAILY
Status: DISCONTINUED | OUTPATIENT
Start: 2021-08-05 | End: 2021-08-05

## 2021-08-05 RX ORDER — DEXAMETHASONE SODIUM PHOSPHATE 4 MG/ML
4 INJECTION, SOLUTION INTRA-ARTICULAR; INTRALESIONAL; INTRAMUSCULAR; INTRAVENOUS; SOFT TISSUE EVERY 6 HOURS
Status: DISCONTINUED | OUTPATIENT
Start: 2021-08-05 | End: 2021-08-06

## 2021-08-05 RX ORDER — ONDANSETRON 2 MG/ML
4 INJECTION INTRAMUSCULAR; INTRAVENOUS EVERY 6 HOURS PRN
Status: DISCONTINUED | OUTPATIENT
Start: 2021-08-05 | End: 2021-08-11 | Stop reason: HOSPADM

## 2021-08-05 RX ORDER — NEOSTIGMINE METHYLSULFATE 5 MG/5 ML
SYRINGE (ML) INTRAVENOUS PRN
Status: DISCONTINUED | OUTPATIENT
Start: 2021-08-05 | End: 2021-08-05 | Stop reason: SDUPTHER

## 2021-08-05 RX ORDER — FAMOTIDINE 20 MG/1
20 TABLET, FILM COATED ORAL 2 TIMES DAILY
Status: DISCONTINUED | OUTPATIENT
Start: 2021-08-05 | End: 2021-08-06

## 2021-08-05 RX ORDER — PANTOPRAZOLE SODIUM 40 MG/1
40 TABLET, DELAYED RELEASE ORAL
Status: DISCONTINUED | OUTPATIENT
Start: 2021-08-06 | End: 2021-08-11 | Stop reason: HOSPADM

## 2021-08-05 RX ORDER — INSULIN LISPRO 100 [IU]/ML
0-6 INJECTION, SOLUTION INTRAVENOUS; SUBCUTANEOUS EVERY 6 HOURS
Status: DISCONTINUED | OUTPATIENT
Start: 2021-08-05 | End: 2021-08-05

## 2021-08-05 RX ORDER — GLYCOPYRROLATE 1 MG/5 ML
SYRINGE (ML) INTRAVENOUS PRN
Status: DISCONTINUED | OUTPATIENT
Start: 2021-08-05 | End: 2021-08-05 | Stop reason: SDUPTHER

## 2021-08-05 RX ORDER — DEXTROSE MONOHYDRATE 25 G/50ML
12.5 INJECTION, SOLUTION INTRAVENOUS PRN
Status: DISCONTINUED | OUTPATIENT
Start: 2021-08-05 | End: 2021-08-11 | Stop reason: HOSPADM

## 2021-08-05 RX ORDER — INSULIN LISPRO 100 [IU]/ML
0-12 INJECTION, SOLUTION INTRAVENOUS; SUBCUTANEOUS EVERY 6 HOURS
Status: DISCONTINUED | OUTPATIENT
Start: 2021-08-05 | End: 2021-08-08

## 2021-08-05 RX ORDER — SODIUM CHLORIDE 0.9 % (FLUSH) 0.9 %
5-40 SYRINGE (ML) INJECTION PRN
Status: DISCONTINUED | OUTPATIENT
Start: 2021-08-05 | End: 2021-08-11 | Stop reason: HOSPADM

## 2021-08-05 RX ORDER — DEXTROSE MONOHYDRATE 50 MG/ML
100 INJECTION, SOLUTION INTRAVENOUS PRN
Status: DISCONTINUED | OUTPATIENT
Start: 2021-08-05 | End: 2021-08-11 | Stop reason: HOSPADM

## 2021-08-05 RX ORDER — HEPARIN SODIUM 5000 [USP'U]/ML
5000 INJECTION, SOLUTION INTRAVENOUS; SUBCUTANEOUS EVERY 8 HOURS SCHEDULED
Status: DISCONTINUED | OUTPATIENT
Start: 2021-08-06 | End: 2021-08-11 | Stop reason: HOSPADM

## 2021-08-05 RX ORDER — DICYCLOMINE HYDROCHLORIDE 10 MG/1
10 CAPSULE ORAL
Status: DISCONTINUED | OUTPATIENT
Start: 2021-08-05 | End: 2021-08-11 | Stop reason: HOSPADM

## 2021-08-05 RX ORDER — PRAVASTATIN SODIUM 40 MG
40 TABLET ORAL NIGHTLY
Status: DISCONTINUED | OUTPATIENT
Start: 2021-08-05 | End: 2021-08-11 | Stop reason: HOSPADM

## 2021-08-05 RX ORDER — FENTANYL CITRATE 50 UG/ML
INJECTION, SOLUTION INTRAMUSCULAR; INTRAVENOUS PRN
Status: DISCONTINUED | OUTPATIENT
Start: 2021-08-05 | End: 2021-08-05 | Stop reason: SDUPTHER

## 2021-08-05 RX ORDER — ACETAMINOPHEN 325 MG/1
650 TABLET ORAL EVERY 4 HOURS PRN
Status: DISCONTINUED | OUTPATIENT
Start: 2021-08-05 | End: 2021-08-11 | Stop reason: HOSPADM

## 2021-08-05 RX ORDER — SODIUM CHLORIDE 0.9 % (FLUSH) 0.9 %
5-40 SYRINGE (ML) INJECTION EVERY 12 HOURS SCHEDULED
Status: DISCONTINUED | OUTPATIENT
Start: 2021-08-05 | End: 2021-08-11 | Stop reason: HOSPADM

## 2021-08-05 RX ORDER — SODIUM CHLORIDE 0.9 % (FLUSH) 0.9 %
5-40 SYRINGE (ML) INJECTION PRN
Status: DISCONTINUED | OUTPATIENT
Start: 2021-08-05 | End: 2021-08-05

## 2021-08-05 RX ORDER — MAGNESIUM HYDROXIDE 1200 MG/15ML
LIQUID ORAL CONTINUOUS PRN
Status: COMPLETED | OUTPATIENT
Start: 2021-08-05 | End: 2021-08-05

## 2021-08-05 RX ORDER — PHENYLEPHRINE HYDROCHLORIDE 10 MG/ML
INJECTION INTRAVENOUS PRN
Status: DISCONTINUED | OUTPATIENT
Start: 2021-08-05 | End: 2021-08-05 | Stop reason: SDUPTHER

## 2021-08-05 RX ORDER — LISINOPRIL 10 MG/1
10 TABLET ORAL DAILY
Status: DISCONTINUED | OUTPATIENT
Start: 2021-08-05 | End: 2021-08-06

## 2021-08-05 RX ORDER — SODIUM CHLORIDE 0.9 % (FLUSH) 0.9 %
5-40 SYRINGE (ML) INJECTION EVERY 12 HOURS SCHEDULED
Status: DISCONTINUED | OUTPATIENT
Start: 2021-08-05 | End: 2021-08-05

## 2021-08-05 RX ORDER — ROCURONIUM BROMIDE 10 MG/ML
INJECTION, SOLUTION INTRAVENOUS PRN
Status: DISCONTINUED | OUTPATIENT
Start: 2021-08-05 | End: 2021-08-05 | Stop reason: SDUPTHER

## 2021-08-05 RX ORDER — SODIUM CHLORIDE 9 MG/ML
25 INJECTION, SOLUTION INTRAVENOUS PRN
Status: DISCONTINUED | OUTPATIENT
Start: 2021-08-05 | End: 2021-08-06 | Stop reason: SDUPTHER

## 2021-08-05 RX ADMIN — METHOCARBAMOL 1000 MG: 100 INJECTION, SOLUTION INTRAMUSCULAR; INTRAVENOUS at 22:32

## 2021-08-05 RX ADMIN — DEXAMETHASONE SODIUM PHOSPHATE 4 MG: 4 INJECTION, SOLUTION INTRAMUSCULAR; INTRAVENOUS at 16:02

## 2021-08-05 RX ADMIN — LISINOPRIL 10 MG: 10 TABLET ORAL at 09:58

## 2021-08-05 RX ADMIN — ROCURONIUM BROMIDE 30 MG: 10 INJECTION INTRAVENOUS at 10:51

## 2021-08-05 RX ADMIN — PHENYLEPHRINE HYDROCHLORIDE 100 MCG: 10 INJECTION INTRAVENOUS at 11:00

## 2021-08-05 RX ADMIN — FENTANYL CITRATE 100 MCG: 50 INJECTION, SOLUTION INTRAMUSCULAR; INTRAVENOUS at 10:51

## 2021-08-05 RX ADMIN — FAMOTIDINE 20 MG: 20 TABLET ORAL at 19:56

## 2021-08-05 RX ADMIN — INSULIN GLARGINE 14 UNITS: 100 INJECTION, SOLUTION SUBCUTANEOUS at 20:43

## 2021-08-05 RX ADMIN — PROPOFOL 50 MG: 10 INJECTION, EMULSION INTRAVENOUS at 11:20

## 2021-08-05 RX ADMIN — INSULIN LISPRO 2 UNITS: 100 INJECTION, SOLUTION INTRAVENOUS; SUBCUTANEOUS at 22:37

## 2021-08-05 RX ADMIN — CEFAZOLIN SODIUM 2000 MG: 1 POWDER, FOR SOLUTION INTRAMUSCULAR; INTRAVENOUS at 11:04

## 2021-08-05 RX ADMIN — TAMSULOSIN HYDROCHLORIDE 0.4 MG: 0.4 CAPSULE ORAL at 19:55

## 2021-08-05 RX ADMIN — Medication 10 ML: at 19:56

## 2021-08-05 RX ADMIN — ONDANSETRON 4 MG: 2 INJECTION INTRAMUSCULAR; INTRAVENOUS at 11:49

## 2021-08-05 RX ADMIN — PHENYLEPHRINE HYDROCHLORIDE 100 MCG: 10 INJECTION INTRAVENOUS at 11:02

## 2021-08-05 RX ADMIN — LEVETIRACETAM 1000 MG: 100 INJECTION, SOLUTION INTRAVENOUS at 16:01

## 2021-08-05 RX ADMIN — SODIUM CHLORIDE: 9 INJECTION, SOLUTION INTRAVENOUS at 16:01

## 2021-08-05 RX ADMIN — Medication 0.4 MG: at 11:54

## 2021-08-05 RX ADMIN — DICYCLOMINE HYDROCHLORIDE 10 MG: 10 CAPSULE ORAL at 19:15

## 2021-08-05 RX ADMIN — LEVETIRACETAM 1000 MG: 100 INJECTION, SOLUTION INTRAVENOUS at 03:45

## 2021-08-05 RX ADMIN — DICYCLOMINE HYDROCHLORIDE 10 MG: 10 CAPSULE ORAL at 22:31

## 2021-08-05 RX ADMIN — METHOCARBAMOL 1000 MG: 100 INJECTION, SOLUTION INTRAMUSCULAR; INTRAVENOUS at 16:00

## 2021-08-05 RX ADMIN — PRAVASTATIN SODIUM 40 MG: 40 TABLET ORAL at 19:56

## 2021-08-05 RX ADMIN — SODIUM CHLORIDE: 9 INJECTION, SOLUTION INTRAVENOUS at 10:45

## 2021-08-05 RX ADMIN — DEXAMETHASONE SODIUM PHOSPHATE 4 MG: 4 INJECTION, SOLUTION INTRAMUSCULAR; INTRAVENOUS at 20:43

## 2021-08-05 RX ADMIN — Medication 3 MG: at 11:54

## 2021-08-05 RX ADMIN — PROPOFOL 150 MG: 10 INJECTION, EMULSION INTRAVENOUS at 10:51

## 2021-08-05 RX ADMIN — CEFAZOLIN 2000 MG: 10 INJECTION, POWDER, FOR SOLUTION INTRAVENOUS at 20:42

## 2021-08-05 ASSESSMENT — PULMONARY FUNCTION TESTS
PIF_VALUE: 18
PIF_VALUE: 18
PIF_VALUE: 16
PIF_VALUE: 20
PIF_VALUE: 19
PIF_VALUE: 28
PIF_VALUE: 17
PIF_VALUE: 20
PIF_VALUE: 9
PIF_VALUE: 22
PIF_VALUE: 20
PIF_VALUE: 2
PIF_VALUE: 20
PIF_VALUE: 18
PIF_VALUE: 19
PIF_VALUE: 18
PIF_VALUE: 20
PIF_VALUE: 19
PIF_VALUE: 18
PIF_VALUE: 2
PIF_VALUE: 18
PIF_VALUE: 19
PIF_VALUE: 17
PIF_VALUE: 18
PIF_VALUE: 18
PIF_VALUE: 20
PIF_VALUE: 18
PIF_VALUE: 20
PIF_VALUE: 18
PIF_VALUE: 18
PIF_VALUE: 2
PIF_VALUE: 18
PIF_VALUE: 20
PIF_VALUE: 18
PIF_VALUE: 2
PIF_VALUE: 20
PIF_VALUE: 19
PIF_VALUE: 18
PIF_VALUE: 19
PIF_VALUE: 18
PIF_VALUE: 20
PIF_VALUE: 1
PIF_VALUE: 18
PIF_VALUE: 20
PIF_VALUE: 20
PIF_VALUE: 19
PIF_VALUE: 18
PIF_VALUE: 20
PIF_VALUE: 18
PIF_VALUE: 20
PIF_VALUE: 20
PIF_VALUE: 28
PIF_VALUE: 18
PIF_VALUE: 18
PIF_VALUE: 1
PIF_VALUE: 2
PIF_VALUE: 18
PIF_VALUE: 17
PIF_VALUE: 18
PIF_VALUE: 1
PIF_VALUE: 20
PIF_VALUE: 18
PIF_VALUE: 18
PIF_VALUE: 20
PIF_VALUE: 17
PIF_VALUE: 18
PIF_VALUE: 3
PIF_VALUE: 18
PIF_VALUE: 20
PIF_VALUE: 3
PIF_VALUE: 18
PIF_VALUE: 19
PIF_VALUE: 1
PIF_VALUE: 16
PIF_VALUE: 18
PIF_VALUE: 18
PIF_VALUE: 20
PIF_VALUE: 18
PIF_VALUE: 18
PIF_VALUE: 1
PIF_VALUE: 18
PIF_VALUE: 18
PIF_VALUE: 26
PIF_VALUE: 16
PIF_VALUE: 18
PIF_VALUE: 6
PIF_VALUE: 20
PIF_VALUE: 18
PIF_VALUE: 2
PIF_VALUE: 19
PIF_VALUE: 22
PIF_VALUE: 20

## 2021-08-05 ASSESSMENT — ENCOUNTER SYMPTOMS
SHORTNESS OF BREATH: 0
BACK PAIN: 0
APNEA: 0
RHINORRHEA: 0
ABDOMINAL DISTENTION: 0
CONSTIPATION: 0
ABDOMINAL PAIN: 0
CHEST TIGHTNESS: 0

## 2021-08-05 ASSESSMENT — PAIN SCALES - GENERAL
PAINLEVEL_OUTOF10: 0
PAINLEVEL_OUTOF10: 8
PAINLEVEL_OUTOF10: 0

## 2021-08-05 ASSESSMENT — PAIN DESCRIPTION - PROGRESSION: CLINICAL_PROGRESSION: NOT CHANGED

## 2021-08-05 ASSESSMENT — PAIN DESCRIPTION - ONSET: ONSET: ON-GOING

## 2021-08-05 ASSESSMENT — PAIN DESCRIPTION - LOCATION: LOCATION: HEAD

## 2021-08-05 ASSESSMENT — PAIN - FUNCTIONAL ASSESSMENT: PAIN_FUNCTIONAL_ASSESSMENT: PREVENTS OR INTERFERES SOME ACTIVE ACTIVITIES AND ADLS

## 2021-08-05 ASSESSMENT — PAIN DESCRIPTION - FREQUENCY: FREQUENCY: INTERMITTENT

## 2021-08-05 ASSESSMENT — PAIN DESCRIPTION - ORIENTATION: ORIENTATION: RIGHT

## 2021-08-05 ASSESSMENT — PAIN DESCRIPTION - DESCRIPTORS: DESCRIPTORS: ACHING;DISCOMFORT

## 2021-08-05 ASSESSMENT — PAIN DESCRIPTION - PAIN TYPE: TYPE: ACUTE PAIN;SURGICAL PAIN

## 2021-08-05 NOTE — PROGRESS NOTES
Speech Language Pathology  HOLD    Referral rec'd, chart reviewed. Spoke with Bulmaro Sellers NP, who reported pt is having sx today. Because of this, will hold assessment until tomorrow. Discussed with RN, Shelia Wellington, who was in agreement.     Abdi Rogers, Hazel Mendoza  Speech-Language Pathologist  Pager [phone]

## 2021-08-05 NOTE — H&P
ICU HISTORY AND PHYSICAL       Hospital Day: 1  ICU Day: 1               Code:Full Code  Admit Date: 8/5/2021  PCP: Fernando Bradley MD                                  CC: slurred speech, left facial droop, and seizure    HISTORY OF PRESENT ILLNESS:   Bonilla Peck is a 78 y/o male w/ PMH of previous brain tumor s/p resection [address], prostate ca, DMII, peptic ulcer, HTN, HLD who presented to [facility] w/ slurred speech, left sided facial droop, and suspected seizure. The patient woke up from a nap at approximately 1330 and reported a headache that was occipital 4/10 dull, and achy that did not radiate. 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 The patient reports that his chronic facial droop is not any worse. EMS was called and the patient's wife reported that the patient had a 2 minute seizure prior to EMS arrival though on questioning in the ICU patient states that he did not have a shaking episode or LOC. The patient has a history of several mechanical falls with patient reporting that he has one about once a week. The patient's last fall was on Sunday. He uses a walker at baseline and has some chronic vision loss from glaucoma. Patient reports falling to the floor after tripping and hitting the back of his head. The patient recently started eliquis in June and has not missed any doses. The patient does not take an AEDs. 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 The patients review of symptoms was otherwise normal.     At the [facility] ED a Ct head w/o contrast showed a \"large R subdural hematoma acute on chronic that was 1.5 cm in thickness w a 7 mm right to left midline shift\". Neurosurgery was consulted and recommended transfer to [facility] for medical management and observation. The patient's INR was 1.44, POC glucose was 183, Hgb was 12.7. The patients NIH stroke scale was an 8. While in the ED the patient was given 1.5 g Keppra and KCentra. Repeat CT head w/o contrast showed a slightly larger subdural hematoma that was 1.7 cm in thickness and stable 7 mm right to left midline shift. \" Since the patient is on eliquis and the subdural hematoma is slightly larger patient will be transferred to ICU for q1 hr neurochecks, blood pressure control, and further workup. 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 Patient is usually able to do the activities of daily living    Alcohol: Patient denies alcohol use  Illicit drugs: Patient denies illicit drug use  Tobacco: Patient denies tobacco product use    Family History:  Family History   Problem Relation Age of Onset    Diabetes Mother     Cancer Father         bone    Arthritis Other     Diabetes Other     High Blood Pressure Other        MEDICATIONS:     No current facility-administered medications on file prior to encounter.      Current Outpatient Medications on File Prior to Encounter   Medication Sig Dispense Refill    apixaban (ELIQUIS) 5 MG TABS tablet Take 5 mg by mouth 2 times daily      lisinopril (PRINIVIL;ZESTRIL) 10 MG tablet Take 10 mg by mouth daily      tamsulosin (FLOMAX) 0.4 MG capsule Take 0.4 mg by mouth nightly      insulin glargine (LANTUS) 100 UNIT/ML injection vial Inject 14 Units into the skin nightly      dicyclomine (BENTYL) 10 MG capsule Take 10 mg by mouth 4 times daily (before meals and nightly)       pravastatin (PRAVACHOL) 80 MG tablet Take 40 mg by mouth nightly       omeprazole (PRILOSEC) 40 MG capsule Take 40 mg by mouth daily.  carboxymethylcellulose (THERATEARS) 1 % ophthalmic gel Place 1 drop into the left eye 3 times daily as needed for Dry Eyes      furosemide (LASIX) 20 MG tablet Take 20 mg by mouth 4 times daily as needed        Scheduled Meds:   Continuous Infusions:  PRN Meds:    Allergies: Allergies   Allergen Reactions    Iv Contrast [Iodides] Other (See Comments)     Severe hypotension    Sulfa Antibiotics Hives       REVIEW OF SYSTEMS:       History obtained from the patient    Review of Systems   Constitutional: Negative for activity change and appetite change. HENT: Negative for postnasal drip and rhinorrhea. Eyes: Negative for visual disturbance. Respiratory: Negative for apnea, chest tightness and shortness of breath. Cardiovascular: Negative for chest pain, palpitations and leg swelling. Gastrointestinal: Negative for abdominal distention, abdominal pain and constipation. Genitourinary: Negative for difficulty urinating, dysuria and frequency. Musculoskeletal: Negative for arthralgias, back pain and gait problem. Skin: Negative for rash and wound. Neurological: Positive for seizures, facial asymmetry and headaches. Psychiatric/Behavioral: Negative for agitation, behavioral problems and confusion. PHYSICAL EXAM:       Vitals: BP (!) 147/92   Pulse 69   Temp 97.9 °F (36.6 °C) (Axillary)   Resp 8   Ht 5' 10\" (1.778 m)   Wt 211 lb 13.8 oz (96.1 kg)   BMI 30.40 kg/m²     I/O:  No intake or output data in the 24 hours ending 08/05/21 0111  No intake/output data recorded. No intake/output data recorded. Physical Examination:     Physical Exam  Constitutional:       Appearance: Normal appearance.    HENT:      Head: Normocephalic and atraumatic. Eyes:      Extraocular Movements: Extraocular movements intact. Pupils: Pupils are equal, round, and reactive to light. Cardiovascular:      Rate and Rhythm: Normal rate and regular rhythm. Pulses: Normal pulses. Heart sounds: Normal heart sounds. Pulmonary:      Effort: Pulmonary effort is normal.      Breath sounds: Normal breath sounds. Abdominal:      General: Abdomen is flat. Bowel sounds are normal.      Palpations: Abdomen is soft. Musculoskeletal:      Right lower leg: No edema. Left lower leg: No edema. Skin:     Findings: No bruising, lesion or rash. Neurological:      Mental Status: He is alert and oriented to person, place, and time. Comments: Patient has a baseline L sided facial droop. Patient reports that it is no worse then baseline. Patient has some slurring of speech. They are A&OX4. Cranial nerves intact excluding L sided facial droop. 5+ strength in all extremities, sensation intact. Access:   -Central Access Day #: 0                                  -Peripheral Access Day#:1  -Arterial line Day#:0                                  Jensen Day#:0  NGT Day#: 0                                              Vent Settings:    / / /     No results for input(s): PHART, CVL9ESA, PO2ART in the last 72 hours. DATA:       Labs:  CBC:   Recent Labs     08/04/21  1446   WBC 8.1   HGB 12.7*   HCT 39.1*          BMP:   Recent Labs     08/04/21  1446      K 4.6   CL 99   CO2 25   BUN 15   CREATININE 1.2   GLUCOSE 197*     LFT's:   Recent Labs     08/04/21  1446   AST 15   ALT 12   BILITOT 0.6   ALKPHOS 128     Troponin: No results for input(s): TROPONINI in the last 72 hours. BNP:No results for input(s): BNP in the last 72 hours. ABGs: No results for input(s): PHART, LPV1XCA, PO2ART in the last 72 hours.   INR:   Recent Labs     08/04/21  1446   INR 1.44*       U/A:No results for input(s): NITRITE, COLORU, PHUR, LABCAST, WBCUA, RBCUA, MUCUS, TRICHOMONAS, YEAST, BACTERIA, CLARITYU, SPECGRAV, LEUKOCYTESUR, UROBILINOGEN, BILIRUBINUR, BLOODU, GLUCOSEU, AMORPHOUS in the last 72 hours. Invalid input(s): Sarai Kang    CT head without contrast    (Results Pending)       EKG:   Echo:  Micro:     ASSESSMENT AND PLAN:   Ernesto Nunn is a 77 y.o. male,  w/ PMH of previous brain tumor s/p resection [address], prostate ca, DMII, peptic ulcer, HTN, HLD who presented to [facility] w/ slurred speech, chronic left sided facial droop, and suspected seizure. CT head w/o contast found 1.5 cm R subdural hematoma w/ 7 mm midline shift. Patient takes eliquis and has INR of 1.44. Repeat CT found slightly increased subdural hematoma at 1.7 cm thickness and stable MLS. Patient transferred to [facility] for observation and medical optimization. #R subdural hematoma  -Patient presented w/ L facial droop, HA, and suspected seizure after waking up from nap at 1330 (8/4/2021). On presentation to ED patient denies seizure and states L sided facial droop is baseline. May be useful to call wife during the day to clarify hx  -CT head w/o contast found 1.5 cm R subdural hematoma w/ 7 mm midline shift. Repeat CT found slightly worsening subdural hematoma w/ 1.7 cm thickness and stable MLS.    -Patient on eliquis INR of 1.44 on arrival to ED  -At [facility] ED patient received 1.5 g keppra and Kcentra for reversal  - elevate HOB  - q1h neurochecks  - repeat CT head after 6h  - SBP goal <160: nicardipine gtt/PRN  - Hold eliquis  - Keppra 1,000 mg BID for seizure ppx  - NSGY consult  - NPO  - SLP  - repeat CT and contact NSGY for any changes in neuro exam    #HTN  -Hold home lisinopril 10 mg pending SLP swallow evaluation    #HLD  Start rosuvastatin 40 mg after clearance of swallow study    Code Status:Full Code  FEN: None  PPX:  SCDs  DISPO:     This patient has been staffed and discussed with Dr. Micael Dubin  -----------------------------  Antonia Boyd Pratibha Elizondo MD, PGY-1  8/5/2021  1:11 AM

## 2021-08-05 NOTE — CONSULTS
Clinical Pharmacy Consult Note    77 y.o. male admitted with Large right subdural hematoma with mixed attenuation suggesting acute on chronic or active bleeding. Pharmacy has been asked by Dr. Marleny Mo to adjust all drips to normal saline as appropriate based on compatibility to avoid fluid shifts since D5 is osmotically active. The following intermittent IV drips/infusions have been adjusted to saline: Keppra - Already in 0.9%NS    The following medications must remain in D5W due to incompatibility with normal saline:  Amphotericin  Mycophenolate  Nitroprusside  Penicillin G Potassium    Please be aware that patient has D5W ordered as part of hypoglycemia orderset. Total IV fluid delivered to patient over last 24h: Not yet due    [facility] will follow daily to ensure all new IVPBs + drips are in NS. Please call with questions. Thanks!   Vanessa Pederson, [facility]

## 2021-08-05 NOTE — ANESTHESIA PRE PROCEDURE
Department of Anesthesiology  Preprocedure Note       Name:  Alphonso Rodriguez   Age:  77 y.o.  :  1945                                          MRN:  [id number]         Date:  2021      Surgeon: Tyree Lackey):  Harshal Reed. Elsi Zuniga MD    Procedure: Procedure(s):  TREPHINE CRANIOTOMY FOR SUBDURAL EVACUATION  EXPOSE AND REPLACE PROGRAMMABLE SHUNT VALVE    Medications prior to admission:   Prior to Admission medications    Medication Sig Start Date End Date Taking? Authorizing Provider   apixaban (ELIQUIS) 5 MG TABS tablet Take 5 mg by mouth 2 times daily 2/15/21  Yes Historical Provider, MD   lisinopril (PRINIVIL;ZESTRIL) 10 MG tablet Take 10 mg by mouth daily 3/15/21  Yes Historical Provider, MD   tamsulosin (FLOMAX) 0.4 MG capsule Take 0.4 mg by mouth nightly 21  Yes Historical Provider, MD   insulin glargine (LANTUS) 100 UNIT/ML injection vial Inject 14 Units into the skin nightly   Yes Historical Provider, MD   dicyclomine (BENTYL) 10 MG capsule Take 10 mg by mouth 4 times daily (before meals and nightly)    Yes Historical Provider, MD   pravastatin (PRAVACHOL) 80 MG tablet Take 40 mg by mouth nightly    Yes Historical Provider, MD   omeprazole (PRILOSEC) 40 MG capsule Take 40 mg by mouth daily.    Yes Historical Provider, MD   carboxymethylcellulose (THERATEARS) 1 % ophthalmic gel Place 1 drop into the left eye 3 times daily as needed for Dry Eyes 21   Historical Provider, MD   furosemide (LASIX) 20 MG tablet Take 20 mg by mouth 4 times daily as needed     Historical Provider, MD       Current medications:    Current Facility-Administered Medications   Medication Dose Route Frequency Provider Last Rate Last Admin    sodium chloride flush 0.9 % injection 5-40 mL  5-40 mL Intravenous 2 times per day Janette Watts MD        sodium chloride flush 0.9 % injection 5-40 mL  5-40 mL Intravenous PRN Janette Watts MD        0.9 % sodium chloride infusion  25 mL Intravenous PRN Linus Bernstein Gildardo Doherty MD        acetaminophen (TYLENOL) tablet 650 mg  650 mg Oral Q4H PRN Diana Ch MD        ondansetron (ZOFRAN-ODT) disintegrating tablet 4 mg  4 mg Oral Q8H PRN Diana Ch MD        Or    ondansetron [facility]) injection 4 mg  4 mg Intravenous Q6H PRN Diana Ch MD        levETIRAcetam (KEPPRA) 1,000 mg in sodium chloride 0.9 % 100 mL IVPB  1,000 mg Intravenous Q12H Diana Ch MD   Stopped at 08/05/21 0400    labetalol (NORMODYNE;TRANDATE) injection 10 mg  10 mg Intravenous Q10 Min PRN Diana Ch MD        rosuvastatin (CRESTOR) tablet 40 mg  40 mg Oral Nightly Diana Ch MD        insulin lispro (1 Unit Dial) 0-6 Units  0-6 Units Subcutaneous Q6H Hermann Leroy MD        glucose (GLUTOSE) 40 % oral gel 15 g  15 g Oral PRN Hermann Leroy MD        dextrose 50 % IV solution  12.5 g Intravenous PRN Hermann Leroy MD        glucagon (rDNA) injection 1 mg  1 mg Intramuscular PRN Hermann Leroy MD        dextrose 5 % solution  100 mL/hr Intravenous PRN Hermann Leroy MD        insulin glargine (LANTUS;BASAGLAR) injection pen 10 Units  10 Units Subcutaneous Nightly Hermann Leroy MD        lisinopril (PRINIVIL;ZESTRIL) tablet 10 mg  10 mg Oral Daily [facility], DO   10 mg at 08/05/21 2073       Allergies:     Allergies   Allergen Reactions    Iv Contrast [Iodides] Other (See Comments)     Severe hypotension    Sulfa Antibiotics Hives       Problem List:    Patient Active Problem List   Diagnosis Code    Left knee pain M25.562    Tibial plateau fracture, left S82.142A    DM2 (diabetes mellitus, type 2) (HCC) E11.9    HTN (hypertension) I10    High cholesterol E78.00    Subdural hematoma ([facility] A98.7T4W       Past Medical History:        Diagnosis Date    Arthritis     Brain tumor ([facility] 1983, 1995    surgery to remove tumor left left sided weakness    Cancer ([facility]     prostate    Deaf     left ear from 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    Diabetes [facility]     Diabetes mellitus ([facility]     Endocarditis late     High blood pressure     Peptic ulcer 2013    stopped aspirin       Past Surgical History:        Procedure Laterality Date    APPENDECTOMY  age 4    CARDIAC SURGERY      repair heart valve surgery at [address]  age 10   [address]      Brain    UPPER GASTROINTESTINAL ENDOSCOPY         Social History:    Social History     Tobacco Use    Smoking status: Former Smoker     Years: 2.00     Quit date: 1960     Years since quittin.3    Smokeless tobacco: Never Used   Substance Use Topics    Alcohol use: Yes     Comment: Lovemuriel                                Counseling given: Not Answered      Vital Signs (Current):   Vitals:    21 0500 21 0600 21 0700 21 0958   BP: 131/72 133/79 (!) 143/94 (!) 153/105   Pulse: 77 61 83    Resp: 11     Temp:       TempSrc:       SpO2: 100% 99% 100%    Weight:       Height:                                                  BP Readings from Last 3 Encounters:   21 (!) 153/105   21 136/82   04/10/14 131/70       NPO Status:                                                                                 BMI:   Wt Readings from Last 3 Encounters:   21 211 lb 13.8 oz (96.1 kg)   21 211 lb 13.8 oz (96.1 kg)   04/10/14 205 lb (93 kg)     Body mass index is 30.4 kg/m².     CBC:   Lab Results   Component Value Date    WBC 7.9 2021    RBC 4.57 2021    HGB 12.7 2021    HCT 38.7 2021    MCV 84.6 2021    RDW 14.7 2021     2021       CMP:   Lab Results   Component Value Date     2021    K 4.6 2021    K 4.6 2021     2021    CO2 27 2021    BUN 13 2021    CREATININE 1.0 2021    GFRAA >60 2021    AGRATIO 1.2 2021    LABGLOM >60 2021    GLUCOSE 145 2021    PROT 6.6 2021    CALCIUM 9.1 2021 BILITOT 0.6 08/04/2021    ALKPHOS 128 08/04/2021    AST 15 08/04/2021    ALT 12 08/04/2021       POC Tests:   Recent Labs     08/05/21  7101   POCGLU 127*       Coags:   Lab Results   Component Value Date    PROTIME 14.6 08/05/2021    INR 1.28 08/05/2021    APTT 36.7 08/04/2021       HCG (If Applicable): No results found for: PREGTESTUR, PREGSERUM, HCG, HCGQUANT     ABGs: No results found for: PHART, PO2ART, QBY1ZYT, KPD6SAK, BEART, B7WFNXED     Type & Screen (If Applicable):  Lab Results   Component Value Date    LABABO B 01/08/2013    [address] Positive 01/08/2013       Drug/Infectious Status (If Applicable):  No results found for: HIV, HEPCAB    COVID-19 Screening (If Applicable): No results found for: COVID19        Anesthesia Evaluation  Patient summary reviewed and Nursing notes reviewed no history of anesthetic complications:   Airway: Mallampati: II  TM distance: >3 FB   Neck ROM: full  Mouth opening: > = 3 FB Dental:      Comment: Poor dentition    Pulmonary:                              Cardiovascular:    (+) hypertension:, dysrhythmias: atrial fibrillation,         Rhythm: irregular  Rate: abnormal                 ROS comment: Sick sinus syndrome   Chronic AF  S/p MVR     Neuro/Psych:   (+) seizures:,              ROS comment: Acute on chronic SDH  Chronic L facial droop GI/Hepatic/Renal:   (+) PUD,           Endo/Other:    (+) DiabetesType II DM, , .                 Abdominal:             Vascular: Other Findings:           Anesthesia Plan      general     ASA 3 - emergent       Induction: intravenous. MIPS: Prophylactic antiemetics administered. Anesthetic plan and risks discussed with patient. Plan discussed with CRNA.                   Andra Daniels DO   8/5/2021

## 2021-08-05 NOTE — PLAN OF CARE
Problem: Skin Integrity:  Goal: Will show no infection signs and symptoms  Description: Will show no infection signs and symptoms  Outcome: Ongoing  Goal: Absence of new skin breakdown  Description: Absence of new skin breakdown  Outcome: Ongoing     Problem: Falls - Risk of:  Goal: Will remain free from falls  Description: Will remain free from falls  Outcome: Ongoing  Goal: Absence of physical injury  Description: Absence of physical injury  Outcome: Ongoing     Problem: HEMODYNAMIC STATUS  Goal: Patient has stable vital signs and fluid balance  Outcome: Ongoing     Problem: ACTIVITY INTOLERANCE/IMPAIRED MOBILITY  Goal: Mobility/activity is maintained at optimum level for patient  Outcome: Ongoing

## 2021-08-05 NOTE — PROGRESS NOTES
Clinical Pharmacy Consult Note    77 y.o. male admitted with a right subdural hematoma. Pharmacy has been asked by Dr. Pratibha Elizondo to adjust all drips to normal saline as appropriate based on compatibility to avoid fluid shifts since D5 is osmotically active. The following intermittent IV drips/infusions have been adjusted to saline:  · None, all IVs are currently in NS    The following medications must remain in D5W due to incompatibility with normal saline:  Amphotericin  Mycophenolate  Nitroprusside  Penicillin G Potassium    Please be aware that patient has D5W ordered as part of hypoglycemia orderset. Total IV fluid delivered to patient over last 24h: 362 mL of NS    RPh will follow daily to ensure all new IVPBs + drips are in NS. Thanks for consulting pharmacy! Please call with questions [address]   Pharmacy Resident   8/5/2021 4:27 PM

## 2021-08-05 NOTE — PROGRESS NOTES
Occupational Therapy/Physical Therapy  Discharge    Orders received, chart reviewed. Per discussion w/ nurse, pt to have a craniotomy today. Will sign off at this time and await new orders postop. Discussed w/ nurse.     Isadora Rosales OTR/L [address], [address] [place] [address]

## 2021-08-05 NOTE — CARE COORDINATION
Case Management Assessment           Initial Evaluation                Date / Time of Evaluation: 8/5/2021 9:33 AM                 Assessment Completed by: Rajeev Rojo RN     Patient is from home with his spouse and was independent with all ADL's prior. He had no services but did ambulate with a walker. His wife is home and able to provide 24/7 assist. They live in a single level home with no steps to enter. Patient Name: Frankey Drones     YOB: 1945  Diagnosis: Subdural hematoma [facility]) [M72.5N9K]     Date / Time: 8/5/2021 12:39 AM    Patient Admission Status: Inpatient    If patient is discharged prior to next notation, then this note serves as note for discharge by case management. Current PCP: Merari Agustin MD  Clinic Patient: No    Chart Reviewed: Yes  Patient/ Family Interviewed: Yes    Initial assessment completed at bedside with: patient    Hospitalization in the last 30 days: No    Emergency Contacts:  Extended Emergency Contact Information  Primary Emergency Contact: Elyse Dorman  Address: [address] [place] [address], [address] [place] [address] Phone: [phone]  Relation: Spouse    Advance Directives:   Code Status: Full Code    Healthcare Power of : No  Agent: NA  Contact Number: NA    Financial  Payor: Ana Johnson / Plan: Arnoldo Babin ESSENTIAL/PLUS / Product Type: *No Product type* /     Pre-cert required for SNF: Yes    Pharmacy    [address] [place] [address]  Phone: [phone] Fax: [phone]      Potential assistance Purchasing Medications: Potential Assistance Purchasing Medications: No  Does Patient want to participate in local refill/ meds to beds program?: No    Meds To Beds General Rules:  1. Can ONLY be done Monday- Friday between 8:30am-5pm  2. Prescription(s) must be in pharmacy by 3pm to be filled same day  3. Copy of patient's insurance/ prescription drug card and patient face sheet must be sent along with the prescription(s)  4. Cost of Rx cannot be added to hospital bill. If financial assistance is needed, please contact unit  or ;  or  CANNOT provide pharmacy voucher for patients co-pays  5. Patients can then  the prescription on their way out of the hospital at discharge, or pharmacy can deliver to the bedside if staff is available. (payment due at time of pick-up or delivery - cash, check, or card accepted)     Able to afford home medications/ co-pay costs: Yes    ADLS  Support Systems: Spouse/Significant Other, Children    PT AM-PAC:   /24  OT AM-PAC:   /24    [place]: single level home  Steps: none    Plans to RETURN to current housing: Yes  Barriers to RETURNING to current housing: none noted      Durable Medical Equipment  DME Provider:   Equipment: walker      DISCHARGE PLAN:  Disposition: TBD    Transportation PLAN for discharge: TBD     Factors facilitating achievement of predicted outcomes: Family support, Cooperative and Pleasant    Barriers to discharge: craniotomy today    Additional Case Management Notes: NA    The Plan for Transition of Care is related to the following treatment goals of Subdural hematoma ([facility] [address] [S06.5X9A]    The Patient and/or patient representative Loree Alfonso and his family were provided with a choice of provider and agrees with the discharge plan Not Indicated    Freedom of choice list was provided with basic dialogue that supports the patient's individualized plan of care/goals and shares the quality data associated with the providers.  Not Indicated    Care Transition patient: No    Noemi Moore RN  [facility] [employer]  Case Management Department  Ph: [phone]   Fax: [phone]

## 2021-08-05 NOTE — CONSULTS
NEUROSURGERY CONSULT NOTE    Cj Baxter  [id number]   1945 8/5/2021    Requesting physician: Uyen Ulrich DO    Reason for consultation: SDH    History of present illness: Patient is a 77 y.o. male that  has a past medical history of Arthritis, Brain tumor ([address] ([address]), Cancer ([address] Deaf, Diabetes ([address] Diabetes mellitus ([address] Endocarditis (late 1990's), High blood pressure, and Peptic ulcer (2013). Patient presented on 8/4/2021 to [facility] ED c/o headache. Patient states he woke up after a nap around 1:45 PM yesterday with a headache. Called EMS and had one seizure prior to EMS arrival. No history of seizures. Not on any antiepileptics. 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  Patient does report that he takes Eliquis. Has not missed any doses. Patient has fallen mechanically several times last one was 3 to 4 days ago without loss of consciousness or any other injury. CT Head showed a \"large R subdural hematoma acute on chronic that was 1.5 cm in thickness w a 7 mm right to left midline shift\". Neurosurgery was consulted and recommended transfer to [facility] for medical management and observation. ROS:   GENERAL:  Denies fever or recent illness.  Denies weight changes   EYES:  Denies vision change or diplopia  EARS:  Denies hearing loss  CARDIAC:  Denies chest pain  RESPIRATORY:  Denies shortness of breath  SKIN:  Denies rash or lesions   HEM:  Denies excessive bruising  PSYCH:  Denies anxiety or depression  NEURO: Endorses dysarthria and left side facial weakness (chronic) and headache; Denies numbness or tingling or lateralizing weakness   :  Denies urinary difficulty  GI: Denies nausea, vomiting, diarrhea or constipation  MUSCULOSKELETAL:  No arthralgias    Allergies   Allergen Reactions    Iv Contrast [Iodides] Other (See Comments)     Severe hypotension    Sulfa Antibiotics Hives       Past Medical History: Diagnosis Date    Arthritis     Brain tumor ([facility] [address] , [address]    surgery to remove tumor left left sided weakness    Cancer ([facility] [address]     prostate    Deaf     left ear from 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    Diabetes ([facility] [address]     Diabetes mellitus ([facility] [address]     Endocarditis late     High blood pressure     Peptic ulcer 2013    stopped aspirin        Past Surgical History:   Procedure Laterality Date    APPENDECTOMY  age 4    CARDIAC SURGERY      repair heart valve surgery at [address] TONSILLECTOMY  age 11    TUMOR EXCISION      Brain    UPPER GASTROINTESTINAL ENDOSCOPY  2013       Social History     Occupational History    Not on file   Tobacco Use    Smoking status: Former Smoker     Years: 2.00     Quit date: 1960     Years since quittin.3    Smokeless tobacco: Never Used   Substance and Sexual Activity    Alcohol use: Yes     Comment: [place]    Drug use: No    Sexual activity: Not Currently        Family History   Problem Relation Age of Onset    Diabetes Mother     Cancer Father         bone    Arthritis Other     Diabetes Other     High Blood Pressure Other         Outpatient Medications Marked as Taking for the 21 encounter [facility] Encounter)   Medication Sig Dispense Refill    apixaban (ELIQUIS) 5 MG TABS tablet Take 5 mg by mouth 2 times daily      lisinopril (PRINIVIL;ZESTRIL) 10 MG tablet Take 10 mg by mouth daily      tamsulosin (FLOMAX) 0.4 MG capsule Take 0.4 mg by mouth nightly      insulin glargine (LANTUS) 100 UNIT/ML injection vial Inject 14 Units into the skin nightly      dicyclomine (BENTYL) 10 MG capsule Take 10 mg by mouth 4 times daily (before meals and nightly)       pravastatin (PRAVACHOL) 80 MG tablet Take 40 mg by mouth nightly       omeprazole (PRILOSEC) 40 MG capsule Take 40 mg by mouth daily.           Current Facility-Administered Medications   Medication Dose Route Frequency Provider Last Rate Last Admin    sodium chloride flush 0.9 % injection 5-40 mL  5-40 mL Intravenous 2 times per day Hiro Arzola MD        sodium chloride flush 0.9 % injection 5-40 mL  5-40 mL Intravenous PRN Hiro Arzola MD        0.9 % sodium chloride infusion  25 mL Intravenous PRN Hiro Arzola MD        acetaminophen (TYLENOL) tablet 650 mg  650 mg Oral Q4H PRN Hiro Arzola MD        ondansetron (ZOFRAN-ODT) disintegrating tablet 4 mg  4 mg Oral Q8H PRN Hiro Arzola MD        Or    ondansetron [facility]) injection 4 mg  4 mg Intravenous Q6H PRN Hiro Arzola, MD        levETIRAcetam (KEPPRA) 1,000 mg in sodium chloride 0.9 % 100 mL IVPB  1,000 mg Intravenous Q12H Hiro Arzola MD   Stopped at 08/05/21 0400    labetalol (NORMODYNE;TRANDATE) injection 10 mg  10 mg Intravenous Q10 Min PRN Hiro Arzola MD        rosuvastatin (CRESTOR) tablet 40 mg  40 mg Oral Nightly Hiro Arzola MD        insulin lispro (1 Unit Dial) 0-6 Units  0-6 Units Subcutaneous Q6H Rufina Becerra MD        glucose (GLUTOSE) 40 % oral gel 15 g  15 g Oral PRYONI Becerra MD        dextrose 50 % IV solution  12.5 g Intravenous PRYONI Becerra MD        glucagon (rDNA) injection 1 mg  1 mg Intramuscular PRYONI Becerra MD        dextrose 5 % solution  100 mL/hr Intravenous PRYONI Becerra MD        insulin glargine (LANTUS;BASAGLAR) injection pen 10 Units  10 Units Subcutaneous Nightly Rufina Becerra MD        lisinopril (PRINIVIL;ZESTRIL) tablet 10 mg  10 mg Oral Daily Isai Arredondo, DO   10 mg at 08/05/21 6723     Facility-Administered Medications Ordered in Other Encounters   Medication Dose Route Frequency Provider Last Rate Last Admin    propofol injection   Intravenous PRN Jerad Bar, DO   50 mg at 08/05/21 1120    rocuronium (ZEMURON) injection   Intravenous PRN Jerad Bar, DO   30 mg at 08/05/21 1051    fentaNYL (SUBLIMAZE) injection   Intravenous PRN Jerad Bar, DO   100 mcg at 08/05/21 1051    0.9 % sodium chloride infusion   Intravenous Continuous PRN [place] Colder, DO   New Bag at 08/05/21 1045    phenylephrine (VAZCULEP) injection   Intravenous PRN [place] Colder, DO   100 mcg at 08/05/21 1102    ceFAZolin (ANCEF) injection   Intravenous PRN Shilpa Tamayo APRN - CRNA   2,000 mg at 08/05/21 1104        Objective:  BP (!) 153/105   Pulse 83   Temp 98.2 °F (36.8 °C) (Axillary)   Resp 12   Ht 5' 10\" (1.778 m)   Wt 211 lb 13.8 oz (96.1 kg)   SpO2 100%   BMI 30.40 kg/m²     Physical Exam:   Patient seen and examined  GCS:  4 - Opens eyes on own  5 - Alert and oriented  6 - Follows simple motor commands  General: Well developed. Alert and cooperative in no acute distress. HENT: atraumatic, neck supple  Eyes: Optic discs: Not tested  Pulmonary: unlabored respiratory effort  Cardiovascular:  Warm well perfused. No peripheral edema  Gastrointestinal: abdomen soft, NT, ND    Neurological:  Mental Status: Awake, alert, oriented x 4, speech dysarthic  Attention: Intact  Language: Dysarthria (chronic)  Sensation: Intact to all extremities to light touch  Coordination: Intact    Cranial Nerves:  II: Visual acuity not tested, denies new visual changes / diplopia  III, IV, VI: PERRL, 3 mm bilaterally, EOMI, no nystagmus noted  V: Facial sensation intact bilaterally to touch  VII: Left side facial weakness (chronic)  VIII: Hearing intact bilaterally to spoken voice  IX: Palate movement equal bilaterally  XI: Shoulder shrug equal bilaterally  XII: Tongue midline    Musculoskeletal:   Gait: Not tested   Assist devices: None   Tone: Normal  Motor strength:    Right  Left    Right  Left    Deltoid  5 5  Hip Flex  5 5   Biceps  5 5  Knee Extensors  5 5   Triceps  5 5  Knee Flexors  5 5   Wrist Ext  5 5  Ankle Dorsiflex. 5 5   Wrist Flex  5 5  Ankle Plantarflex.   5 5   Handgrip  5 5  Ext Derrick Longus  5 5   Thumb Ext  5 5         Radiological Findings:  CT HEAD WO CONTRAST  Result Date: 8/4/2021  Large right subdural hematoma with mixed attenuation suggesting acute on chronic or active bleeding. There is 7 mm right to left midline shift. Remote left cerebellar infarct. CT Head WO Contrast  Result Date: 8/4/2021  Stable exam. Acute subdural hematoma along the right cerebral convexity measuring up to 17 mm in maximal thickness, stable. Trace acute subdural hematoma along the falx cerebri and the bilateral tentorium, stable. 7 mm right to left midline shift, stable. Stable right parietal approach ventricular catheter. No hydrocephalus. Encephalomalacia in the left cerebellar hemisphere and left middle cerebellar peduncle/left wyatt, stable     CT head without contrast  Result Date: 8/5/2021  Stable right subdural hematoma with leftward subfalcine herniation. Right-sided ventriculostomy catheter without hydrocephalus. Extensive sinus disease again noted. Labs:  Recent Labs     08/05/21  0546   WBC 7.9   HGB 12.7*   HCT 38.7*   *       Recent Labs     08/05/21  0546      K 4.6      CO2 27   BUN 13   CREATININE 1.0   GLUCOSE 145*   CALCIUM 9.1       Recent Labs     08/04/21  1446 08/04/21  1446 08/05/21  0546   PROTIME 16.5*   < > 14.6*   INR 1.44*   < > 1.28*   APTT 36.7  --   --     < > = values in this interval not displayed. Patient Active Problem List    Diagnosis Date Noted    Subdural hematoma ([facility] [address] 08/04/2021    Tibial plateau fracture, left 04/06/2015    DM2 (diabetes mellitus, type 2) ([facility] [address] 04/06/2015    HTN (hypertension) 04/06/2015    High cholesterol 04/06/2015    Left knee pain 04/10/2014       Assessment:  Patient is a 77 y.o. male w/headache 2/2 stable acute subdural hematoma along the right cerebral convexity measuring up to 17 mm in maximal thickness with brain compression and 7 mm right to left midline shift.  In language easily understood by a layperson, the risks and benefits of surgical intervention were discussed at length with the patient and any family members present. Risks including, but not limited to, infection, bleeding, possible blood transfusion, numbness, weakness, paralysis, loss of bowel or bladder control, or death were explained fully. Any questions were answered to the patient's satisfaction and signed consent obtained. Plan:  1. OR Today to drain SDH and possibly replace non-programmable shunt valve with a programmable valve  2. Neurologic exams frequency:  - ICU: Q1H until otherwise directed  3. For change in exam MUST contact neurosurgery team along with critical care or primary team  4. SDH:  - Follow up head CT stable. - Maintain SBP <160; If PRN med insufficient, then may start Nicardipine infusion  - Keep Plt >100k & INR <1.4  - Hold all full dose anticoagulation & antiplatelet for 2 weeks  5. Cerebral edema prophylaxis:  - Keep Na within normal limits  - HOB >30 degrees  - NO dextrose in IVF's or in IV drips  - If central venous access is needed please use subclavian vs femoral - No IJ as this can decrease venous return and worsen cerebral edema  6. Seizure prophylaxis: Keppra 1000 mg BID  7. GI Prophylaxis: Pepcid  8. Pain: Managed by medical team  9. NPO  10. SCDs for DVT prophylaxis  11. Appreciate critical care team assistance in management  12. Thank you for consult. Will follow inpatient. Please call with any questions or decline in neurological status    DISPO: Remain inpatient from neurosurgery standpoint. Dispo timing to be determined by primary team once patient is medically stable for discharge. Patient was seen and examined with Dr. Jeffrey Melara who agrees with above assessment and plan.      Electronically signed by: KRISTAL Anaya CNP, APRN-CNP, 8/5/2021 11:27 AM  [phone]

## 2021-08-05 NOTE — CONSULTS
repair heart valve surgery at [address] [facility]  age 11    TUMOR EXCISION      Brain    UPPER GASTROINTESTINAL ENDOSCOPY  2013       SocialHistory:   The patient lives at    Alcohol:  Illicit drugs: no use  Tobacco:      Family History:  Family History   Problem Relation Age of Onset    Diabetes Mother     Cancer Father         bone    Arthritis Other     Diabetes Other     High Blood Pressure Other        MEDICATIONS:     No current facility-administered medications on file prior to encounter. Current Outpatient Medications on File Prior to Encounter   Medication Sig Dispense Refill    apixaban (ELIQUIS) 5 MG TABS tablet Take 5 mg by mouth 2 times daily      lisinopril (PRINIVIL;ZESTRIL) 10 MG tablet Take 10 mg by mouth daily      tamsulosin (FLOMAX) 0.4 MG capsule Take 0.4 mg by mouth nightly      insulin glargine (LANTUS) 100 UNIT/ML injection vial Inject 14 Units into the skin nightly      dicyclomine (BENTYL) 10 MG capsule Take 10 mg by mouth 4 times daily (before meals and nightly)       pravastatin (PRAVACHOL) 80 MG tablet Take 40 mg by mouth nightly       omeprazole (PRILOSEC) 40 MG capsule Take 40 mg by mouth daily.  carboxymethylcellulose (THERATEARS) 1 % ophthalmic gel Place 1 drop into the left eye 3 times daily as needed for Dry Eyes      furosemide (LASIX) 20 MG tablet Take 20 mg by mouth 4 times daily as needed            Scheduled Meds:   sodium chloride flush  5-40 mL Intravenous 2 times per day    levetiracetam  1,000 mg Intravenous Q12H    rosuvastatin  40 mg Oral Nightly    insulin lispro  0-6 Units Subcutaneous Q6H    insulin glargine  10 Units Subcutaneous Nightly      Continuous Infusions:   sodium chloride      dextrose       PRN Meds:sodium chloride flush, sodium chloride, acetaminophen, ondansetron **OR** ondansetron, labetalol, glucose, dextrose, glucagon (rDNA), dextrose    Allergies:    Allergies   Allergen Reactions    Iv Contrast [Iodides] Other (See Comments)     Severe hypotension    Sulfa Antibiotics Hives       REVIEW OF SYSTEMS:       History obtained from WIFE    Review of Systems   Constitutional: Negative for fatigue and fever. Cardiovascular: Negative for chest pain. Neurological: Positive for speech difficulty and weakness. Negative for dizziness, light-headedness, numbness and headaches. Psychiatric/Behavioral: Negative for agitation, behavioral problems and confusion. PHYSICAL EXAM:       Vitals: BP (!) 143/94   Pulse 83   Temp 98.2 °F (36.8 °C) (Axillary)   Resp 12   Ht 5' 10\" (1.778 m)   Wt 211 lb 13.8 oz (96.1 kg)   SpO2 100%   BMI 30.40 kg/m²     I/O:      Intake/Output Summary (Last 24 hours) at 8/5/2021 0729  Last data filed at 8/5/2021 0129  Gross per 24 hour   Intake --   Output 200 ml   Net -200 ml     No intake/output data recorded. I/O last 3 completed shifts:  In: -   Out: 200 [Urine:200]    Physical Examination:     Physical Exam  HENT:      Mouth/Throat:      Mouth: Mucous membranes are moist.      Pharynx: Oropharynx is clear. Eyes:      Conjunctiva/sclera: Conjunctivae normal.      Pupils: Pupils are equal, round, and reactive to light. Cardiovascular:      Rate and Rhythm: Normal rate. Rhythm irregular. Pulses: Normal pulses. Heart sounds: Normal heart sounds. No murmur heard. No gallop. Pulmonary:      Effort: Pulmonary effort is normal.      Breath sounds: Normal breath sounds. No wheezing, rhonchi or rales. Abdominal:      General: Abdomen is flat. Bowel sounds are normal. There is no distension. Palpations: Abdomen is soft. Tenderness: There is no abdominal tenderness. There is no guarding. Musculoskeletal:      Right lower leg: No edema. Left lower leg: No edema. Skin:     General: Skin is warm. Capillary Refill: Capillary refill takes less than 2 seconds. Neurological:      Mental Status: He is alert and oriented to person, place, and time. Motor: No weakness. Coordination: Coordination abnormal (finger to nose test with abnormal coordination). Comments:   Left facial droop. Mild left ptosis. Slurred speech         No results for input(s): PHART, QGQ4UOZ, PO2ART in the last 72 hours. DATA:       Labs:  CBC:   Recent Labs     08/04/21 1446 08/05/21  0546   WBC 8.1 7.9   HGB 12.7* 12.7*   HCT 39.1* 38.7*    122*       BMP:   Recent Labs     08/04/21 1446 08/05/21  0546    141   K 4.6 4.6   CL 99 105   CO2 25 27   BUN 15 13   CREATININE 1.2 1.0   GLUCOSE 197* 145*     LFT's:   Recent Labs     08/04/21  1446   AST 15   ALT 12   BILITOT 0.6   ALKPHOS 128     Troponin: No results for input(s): TROPONINI in the last 72 hours. BNP:No results for input(s): BNP in the last 72 hours. ABGs: No results for input(s): PHART, BRJ8PXE, PO2ART in the last 72 hours. INR:   Recent Labs     08/04/21 1446 08/05/21  0546   INR 1.44* 1.28*       U/A:No results for input(s): NITRITE, COLORU, PHUR, LABCAST, WBCUA, RBCUA, MUCUS, TRICHOMONAS, YEAST, BACTERIA, CLARITYU, SPECGRAV, LEUKOCYTESUR, UROBILINOGEN, BILIRUBINUR, BLOODU, GLUCOSEU, AMORPHOUS in the last 72 hours. Invalid input(s): Laly Tom    CT head without contrast   Final Result      Stable right subdural hematoma with leftward subfalcine herniation. Right-sided ventriculostomy catheter without hydrocephalus. Extensive sinus disease again noted.                 EKG:   Echo:  Micro:     ASSESSMENT AND PLAN:   Donnell Medeiros is a 77 y.o. male, with a past medical history of previous brain tumor status post resection 1993 9095 type 2 diabetes mellitus hypertension hyperlipidemia who presented to [facility] complaining of slurred speech left-sided facial droop and query seizure.       Right subdural hematoma  CT head (8/4): Large right subdural hematoma suggesting acute on chronic or active bleeding with a 7 mm right to left midline shift and remote left cerebellar infarct  CT head (8/5): Stable right subdural hematoma with 6mm leftward subfalcine herniation. Right-sided ventriculostomy catheter without hydrocephalus     -Every hour neurochecks/ NIHSS   -Elevate head of bed above 30 degrees   -OT/PT evaluation    -SLP evaluation   -Keppra 1 g every 12 hours   -Seizure precautions   -Hold antiplatelet/anticoagulant   -Neurosurgery following and plan for IR today to drain SDH and possibly replace nonprogrammable shunt valve with a programmable valve. Hypertension  BP: 131/72   To keep SBP <160mmHg   Hold home medications until patient passes swallow test.   -Restart home lisinopril     Hyperlipidemia   start rosuvastatin when passes swallow evaluation    DM type II   - Hypoglycemia protocol   - Insulin sliding scale: LDSSI     Atrial fibrillation   -Hold Eliquis for at least 2 weeks    Code Status:Full Code  FEN: NPO  PPX:  SCDs  DISPO: ICU    This patient has been staffed and discussed with Juan Pablo Lowe MD  -----------------------------  Inge Emery MD, PGY-1  8/5/2021  7:29 AM    Pulm/CC    Patient seen and examined. I agree with  [facility]. Valentina's history, physical, lab findings, assessment and plan and edited as needed. Patient has a nonfocal neurologic exam and appears back to his neurologic baseline which includes a chronic dysarthria but he is fully alert and oriented. He has a very old  shunt with a nonprogrammable valve. Because of that neurosurgery thinks it is best to go ahead and drained the SDH and replace valve to a programmable type. Rest of his medical problems including hypertension, hyperlipidemia and diabetes are stable. Restart lisinopril, Crestor when he passes a swallowing evaluation and is allowed to eat. Once he is taking sufficient p.o. will resume Lantus. Meanwhile cover with moderate dose sliding scale insulin. Given his SDH his Eliquis will be held for minimum of 2 weeks.   Indication is atrial fibrillation      Juan Pablo Lowe MD

## 2021-08-05 NOTE — PROGRESS NOTES
Physician Progress Note      PATIENT:               Thomas Rivera  CSN #:                  [id number]  :                       1945  ADMIT DATE:       2021 12:39 AM  DISCH DATE:  RESPONDING  PROVIDER #:        Dmitry Leger CNP          QUERY TEXT:    Patient admitted with Right acute on chronic subdural hematoma without loss of   consciousness. Fell 3-4 days prior to admission. If possible, please   document in progress notes and discharge summary if you are   treating/evaluating any of the following regarding the acute portion of SDH:    The medical record reflects the following:  Risk Factors: 77 y. o. male with history of falls, HTN, Fell 3-4 days prior to   admission w/o loss of consciousness, with left 6mm subfalcine herniation.   Clinical Indicators: slurred speech, left-sided facial droop on presentation  Treatment: Neurosurgery consult, imaging, OR Today to drain SDH and possibly   replace non-programmable shunt valve with a programmable valve  Options provided:  -- Traumatic SDH without LOC  -- Nontraumatic SDH  -- Other - I will add my own diagnosis  -- Disagree - Not applicable / Not valid  -- Disagree - Clinically unable to determine / Unknown  -- Refer to Clinical Documentation Reviewer    PROVIDER RESPONSE TEXT:    This patient has a traumatic SDH without LOC    Query created by: Jenna Tirado on 2021 1:00 PM      Electronically signed by:  Criss Leger CNP 2021 2:08 PM

## 2021-08-05 NOTE — ANESTHESIA POSTPROCEDURE EVALUATION
Department of Anesthesiology  Postprocedure Note    Patient: Kati Duran  MRN: [id number]  YOB: 1945  Date of evaluation: 8/5/2021  Time:  12:42 PM     Procedure Summary     Date: 08/05/21 Room / Location: [address] [place] [address] [address] [facility] / [facility]    Anesthesia Start: 6534 Anesthesia Stop: 1237    Procedures:       [address] [place] (Right Head)      EXPOSE AND REPLACE PROGRAMMABLE SHUNT VALVE (Right Head) Diagnosis: (subdural hematoma)    Surgeons: Norman Odom MD Responsible Provider: Jennifer Bradford DO    Anesthesia Type: general ASA Status: 3 - Emergent          Anesthesia Type: general    Jayna Phase I:      Jayna Phase II:      Last vitals: Reviewed and per EMR flowsheets.        Anesthesia Post Evaluation    Patient location during evaluation: ICU  Patient participation: complete - patient participated  Airway patency: patent  Nausea & Vomiting: no nausea and no vomiting  Cardiovascular status: blood pressure returned to baseline  Respiratory status: acceptable

## 2021-08-06 LAB
ALBUMIN SERPL-MCNC: 3.5 G/DL (ref 3.4–5)
ANION GAP SERPL CALCULATED.3IONS-SCNC: 10 MMOL/L (ref 3–16)
ANION GAP SERPL CALCULATED.3IONS-SCNC: 9 MMOL/L (ref 3–16)
APTT: 34.1 SEC (ref 26.2–38.6)
BASOPHILS ABSOLUTE: 0 K/UL (ref 0–0.2)
BASOPHILS RELATIVE PERCENT: 0.1 %
BUN BLDV-MCNC: 18 MG/DL (ref 7–20)
BUN BLDV-MCNC: 26 MG/DL (ref 7–20)
CALCIUM SERPL-MCNC: 9.2 MG/DL (ref 8.3–10.6)
CALCIUM SERPL-MCNC: 9.4 MG/DL (ref 8.3–10.6)
CHLORIDE BLD-SCNC: 101 MMOL/L (ref 99–110)
CHLORIDE BLD-SCNC: 106 MMOL/L (ref 99–110)
CO2: 26 MMOL/L (ref 21–32)
CO2: 27 MMOL/L (ref 21–32)
CREAT SERPL-MCNC: 1 MG/DL (ref 0.8–1.3)
CREAT SERPL-MCNC: 1.2 MG/DL (ref 0.8–1.3)
EKG ATRIAL RATE: 441 BPM
EKG DIAGNOSIS: NORMAL
EKG Q-T INTERVAL: 354 MS
EKG QRS DURATION: 66 MS
EKG QTC CALCULATION (BAZETT): 437 MS
EKG R AXIS: -11 DEGREES
EKG T AXIS: 38 DEGREES
EKG VENTRICULAR RATE: 92 BPM
EOSINOPHILS ABSOLUTE: 0 K/UL (ref 0–0.6)
EOSINOPHILS RELATIVE PERCENT: 0 %
ESTIMATED AVERAGE GLUCOSE: 159.9 MG/DL
GFR AFRICAN AMERICAN: >60
GFR AFRICAN AMERICAN: >60
GFR NON-AFRICAN AMERICAN: 59
GFR NON-AFRICAN AMERICAN: >60
GLUCOSE BLD-MCNC: 157 MG/DL (ref 70–99)
GLUCOSE BLD-MCNC: 178 MG/DL (ref 70–99)
GLUCOSE BLD-MCNC: 180 MG/DL (ref 70–99)
GLUCOSE BLD-MCNC: 223 MG/DL (ref 70–99)
GLUCOSE BLD-MCNC: 243 MG/DL (ref 70–99)
GLUCOSE BLD-MCNC: 276 MG/DL (ref 70–99)
HBA1C MFR BLD: 7.2 %
HCT VFR BLD CALC: 38.4 % (ref 40.5–52.5)
HEMOGLOBIN: 12.3 G/DL (ref 13.5–17.5)
INR BLD: 1.23 (ref 0.88–1.12)
LYMPHOCYTES ABSOLUTE: 0.6 K/UL (ref 1–5.1)
LYMPHOCYTES RELATIVE PERCENT: 9.2 %
MAGNESIUM: 1.9 MG/DL (ref 1.8–2.4)
MCH RBC QN AUTO: 27.4 PG (ref 26–34)
MCHC RBC AUTO-ENTMCNC: 32.1 G/DL (ref 31–36)
MCV RBC AUTO: 85.3 FL (ref 80–100)
MONOCYTES ABSOLUTE: 0.1 K/UL (ref 0–1.3)
MONOCYTES RELATIVE PERCENT: 1.6 %
NEUTROPHILS ABSOLUTE: 6.3 K/UL (ref 1.7–7.7)
NEUTROPHILS RELATIVE PERCENT: 89.1 %
PDW BLD-RTO: 14.6 % (ref 12.4–15.4)
PERFORMED ON: ABNORMAL
PHOSPHORUS: 2.9 MG/DL (ref 2.5–4.9)
PLATELET # BLD: 139 K/UL (ref 135–450)
PMV BLD AUTO: 9 FL (ref 5–10.5)
POTASSIUM SERPL-SCNC: 4.5 MMOL/L (ref 3.5–5.1)
POTASSIUM SERPL-SCNC: 5.1 MMOL/L (ref 3.5–5.1)
POTASSIUM SERPL-SCNC: 5.6 MMOL/L (ref 3.5–5.1)
PRO-BNP: 2801 PG/ML (ref 0–449)
PROTHROMBIN TIME: 14 SEC (ref 9.9–12.7)
RBC # BLD: 4.5 M/UL (ref 4.2–5.9)
SODIUM BLD-SCNC: 138 MMOL/L (ref 136–145)
SODIUM BLD-SCNC: 141 MMOL/L (ref 136–145)
WBC # BLD: 7 K/UL (ref 4–11)

## 2021-08-06 PROCEDURE — 97535 SELF CARE MNGMENT TRAINING: CPT

## 2021-08-06 PROCEDURE — 6370000000 HC RX 637 (ALT 250 FOR IP): Performed by: STUDENT IN AN ORGANIZED HEALTH CARE EDUCATION/TRAINING PROGRAM

## 2021-08-06 PROCEDURE — 6370000000 HC RX 637 (ALT 250 FOR IP): Performed by: INTERNAL MEDICINE

## 2021-08-06 PROCEDURE — 6360000002 HC RX W HCPCS: Performed by: PHYSICIAN ASSISTANT

## 2021-08-06 PROCEDURE — 2580000003 HC RX 258: Performed by: PHYSICIAN ASSISTANT

## 2021-08-06 PROCEDURE — 6360000002 HC RX W HCPCS: Performed by: STUDENT IN AN ORGANIZED HEALTH CARE EDUCATION/TRAINING PROGRAM

## 2021-08-06 PROCEDURE — 2580000003 HC RX 258: Performed by: NURSE PRACTITIONER

## 2021-08-06 PROCEDURE — 99232 SBSQ HOSP IP/OBS MODERATE 35: CPT | Performed by: INTERNAL MEDICINE

## 2021-08-06 PROCEDURE — 6370000000 HC RX 637 (ALT 250 FOR IP): Performed by: NURSE PRACTITIONER

## 2021-08-06 PROCEDURE — 2580000003 HC RX 258: Performed by: STUDENT IN AN ORGANIZED HEALTH CARE EDUCATION/TRAINING PROGRAM

## 2021-08-06 PROCEDURE — 85610 PROTHROMBIN TIME: CPT

## 2021-08-06 PROCEDURE — 83036 HEMOGLOBIN GLYCOSYLATED A1C: CPT

## 2021-08-06 PROCEDURE — 97166 OT EVAL MOD COMPLEX 45 MIN: CPT

## 2021-08-06 PROCEDURE — 92523 SPEECH SOUND LANG COMPREHEN: CPT

## 2021-08-06 PROCEDURE — 83735 ASSAY OF MAGNESIUM: CPT

## 2021-08-06 PROCEDURE — 1200000000 HC SEMI PRIVATE

## 2021-08-06 PROCEDURE — 6360000002 HC RX W HCPCS: Performed by: NURSE PRACTITIONER

## 2021-08-06 PROCEDURE — 97530 THERAPEUTIC ACTIVITIES: CPT

## 2021-08-06 PROCEDURE — 97116 GAIT TRAINING THERAPY: CPT

## 2021-08-06 PROCEDURE — 93010 ELECTROCARDIOGRAM REPORT: CPT | Performed by: INTERNAL MEDICINE

## 2021-08-06 PROCEDURE — 6370000000 HC RX 637 (ALT 250 FOR IP): Performed by: PHYSICIAN ASSISTANT

## 2021-08-06 PROCEDURE — 83880 ASSAY OF NATRIURETIC PEPTIDE: CPT

## 2021-08-06 PROCEDURE — 85730 THROMBOPLASTIN TIME PARTIAL: CPT

## 2021-08-06 PROCEDURE — 80069 RENAL FUNCTION PANEL: CPT

## 2021-08-06 PROCEDURE — 2700000000 HC OXYGEN THERAPY PER DAY

## 2021-08-06 PROCEDURE — 84132 ASSAY OF SERUM POTASSIUM: CPT

## 2021-08-06 PROCEDURE — 92610 EVALUATE SWALLOWING FUNCTION: CPT

## 2021-08-06 PROCEDURE — 85025 COMPLETE CBC W/AUTO DIFF WBC: CPT

## 2021-08-06 PROCEDURE — 36415 COLL VENOUS BLD VENIPUNCTURE: CPT

## 2021-08-06 PROCEDURE — 94761 N-INVAS EAR/PLS OXIMETRY MLT: CPT

## 2021-08-06 PROCEDURE — 93005 ELECTROCARDIOGRAM TRACING: CPT | Performed by: STUDENT IN AN ORGANIZED HEALTH CARE EDUCATION/TRAINING PROGRAM

## 2021-08-06 PROCEDURE — 97162 PT EVAL MOD COMPLEX 30 MIN: CPT

## 2021-08-06 RX ORDER — POLYETHYLENE GLYCOL 3350 17 G/17G
17 POWDER, FOR SOLUTION ORAL DAILY
Status: DISCONTINUED | OUTPATIENT
Start: 2021-08-06 | End: 2021-08-11 | Stop reason: HOSPADM

## 2021-08-06 RX ORDER — SODIUM CHLORIDE 9 MG/ML
25 INJECTION, SOLUTION INTRAVENOUS PRN
Status: DISCONTINUED | OUTPATIENT
Start: 2021-08-06 | End: 2021-08-11 | Stop reason: HOSPADM

## 2021-08-06 RX ORDER — LEVETIRACETAM 500 MG/1
1000 TABLET ORAL 2 TIMES DAILY
Status: DISCONTINUED | OUTPATIENT
Start: 2021-08-06 | End: 2021-08-11 | Stop reason: HOSPADM

## 2021-08-06 RX ORDER — LISINOPRIL 10 MG/1
10 TABLET ORAL DAILY
Status: DISCONTINUED | OUTPATIENT
Start: 2021-08-07 | End: 2021-08-11 | Stop reason: HOSPADM

## 2021-08-06 RX ORDER — LIDOCAINE HYDROCHLORIDE 10 MG/ML
5 INJECTION, SOLUTION EPIDURAL; INFILTRATION; INTRACAUDAL; PERINEURAL ONCE
Status: DISCONTINUED | OUTPATIENT
Start: 2021-08-06 | End: 2021-08-11 | Stop reason: HOSPADM

## 2021-08-06 RX ORDER — SODIUM CHLORIDE 0.9 % (FLUSH) 0.9 %
5-40 SYRINGE (ML) INJECTION EVERY 12 HOURS SCHEDULED
Status: DISCONTINUED | OUTPATIENT
Start: 2021-08-06 | End: 2021-08-11 | Stop reason: HOSPADM

## 2021-08-06 RX ORDER — SODIUM CHLORIDE 0.9 % (FLUSH) 0.9 %
5-40 SYRINGE (ML) INJECTION PRN
Status: DISCONTINUED | OUTPATIENT
Start: 2021-08-06 | End: 2021-08-11 | Stop reason: HOSPADM

## 2021-08-06 RX ORDER — SENNA AND DOCUSATE SODIUM 50; 8.6 MG/1; MG/1
2 TABLET, FILM COATED ORAL 2 TIMES DAILY
Status: DISCONTINUED | OUTPATIENT
Start: 2021-08-06 | End: 2021-08-11 | Stop reason: HOSPADM

## 2021-08-06 RX ADMIN — DICYCLOMINE HYDROCHLORIDE 10 MG: 10 CAPSULE ORAL at 17:39

## 2021-08-06 RX ADMIN — SODIUM ZIRCONIUM CYCLOSILICATE 5 G: 5 POWDER, FOR SUSPENSION ORAL at 09:57

## 2021-08-06 RX ADMIN — PANTOPRAZOLE SODIUM 40 MG: 40 TABLET, DELAYED RELEASE ORAL at 06:47

## 2021-08-06 RX ADMIN — CEFAZOLIN 2000 MG: 10 INJECTION, POWDER, FOR SOLUTION INTRAVENOUS at 20:30

## 2021-08-06 RX ADMIN — DEXAMETHASONE SODIUM PHOSPHATE 4 MG: 4 INJECTION, SOLUTION INTRAMUSCULAR; INTRAVENOUS at 03:06

## 2021-08-06 RX ADMIN — INSULIN LISPRO 4 UNITS: 100 INJECTION, SOLUTION INTRAVENOUS; SUBCUTANEOUS at 17:39

## 2021-08-06 RX ADMIN — INSULIN LISPRO 4 UNITS: 100 INJECTION, SOLUTION INTRAVENOUS; SUBCUTANEOUS at 21:41

## 2021-08-06 RX ADMIN — CEFAZOLIN 2000 MG: 10 INJECTION, POWDER, FOR SOLUTION INTRAVENOUS at 04:00

## 2021-08-06 RX ADMIN — HEPARIN SODIUM 5000 UNITS: 5000 INJECTION INTRAVENOUS; SUBCUTANEOUS at 15:00

## 2021-08-06 RX ADMIN — INSULIN GLARGINE 14 UNITS: 100 INJECTION, SOLUTION SUBCUTANEOUS at 20:39

## 2021-08-06 RX ADMIN — SODIUM ZIRCONIUM CYCLOSILICATE 5 G: 5 POWDER, FOR SUSPENSION ORAL at 20:37

## 2021-08-06 RX ADMIN — DICYCLOMINE HYDROCHLORIDE 10 MG: 10 CAPSULE ORAL at 20:38

## 2021-08-06 RX ADMIN — DOCUSATE SODIUM 50 MG AND SENNOSIDES 8.6 MG 2 TABLET: 8.6; 5 TABLET, FILM COATED ORAL at 20:38

## 2021-08-06 RX ADMIN — PRAVASTATIN SODIUM 40 MG: 40 TABLET ORAL at 20:38

## 2021-08-06 RX ADMIN — Medication 10 ML: at 20:38

## 2021-08-06 RX ADMIN — POLYETHYLENE GLYCOL 3350 17 G: 17 POWDER, FOR SOLUTION ORAL at 12:00

## 2021-08-06 RX ADMIN — HEPARIN SODIUM 5000 UNITS: 5000 INJECTION INTRAVENOUS; SUBCUTANEOUS at 21:40

## 2021-08-06 RX ADMIN — SODIUM ZIRCONIUM CYCLOSILICATE 5 G: 5 POWDER, FOR SUSPENSION ORAL at 15:00

## 2021-08-06 RX ADMIN — LEVETIRACETAM 1000 MG: 500 TABLET ORAL at 20:38

## 2021-08-06 RX ADMIN — CEFAZOLIN 2000 MG: 10 INJECTION, POWDER, FOR SOLUTION INTRAVENOUS at 13:00

## 2021-08-06 RX ADMIN — LEVETIRACETAM 1000 MG: 100 INJECTION, SOLUTION INTRAVENOUS at 03:06

## 2021-08-06 RX ADMIN — DOCUSATE SODIUM 50 MG AND SENNOSIDES 8.6 MG 2 TABLET: 8.6; 5 TABLET, FILM COATED ORAL at 12:00

## 2021-08-06 RX ADMIN — DICYCLOMINE HYDROCHLORIDE 10 MG: 10 CAPSULE ORAL at 06:47

## 2021-08-06 RX ADMIN — METHOCARBAMOL 1000 MG: 100 INJECTION, SOLUTION INTRAMUSCULAR; INTRAVENOUS at 06:47

## 2021-08-06 RX ADMIN — TAMSULOSIN HYDROCHLORIDE 0.4 MG: 0.4 CAPSULE ORAL at 20:38

## 2021-08-06 RX ADMIN — DICYCLOMINE HYDROCHLORIDE 10 MG: 10 CAPSULE ORAL at 12:00

## 2021-08-06 RX ADMIN — INSULIN LISPRO 2 UNITS: 100 INJECTION, SOLUTION INTRAVENOUS; SUBCUTANEOUS at 04:39

## 2021-08-06 ASSESSMENT — PAIN SCALES - GENERAL
PAINLEVEL_OUTOF10: 0

## 2021-08-06 ASSESSMENT — ENCOUNTER SYMPTOMS
RHINORRHEA: 0
ABDOMINAL PAIN: 0
COUGH: 0
SHORTNESS OF BREATH: 0
CHEST TIGHTNESS: 0
APNEA: 0
ABDOMINAL DISTENTION: 0
CONSTIPATION: 0

## 2021-08-06 NOTE — PROGRESS NOTES
Hospital Medicine Care  Progress Note      Chief complain; SDH          Subjective:     Sleepy, as seen post op  Denies any HA  Denies any nausea, emesis  Afebrile  Sitting up in a chair     Review of Systems:     Review of Systems as mentioned above, other ros is negative.      Objective:       Medications        Scheduled Meds:   sodium zirconium cyclosilicate  5 g Oral TID    levETIRAcetam  1,000 mg Oral BID    lidocaine 1 % injection  5 mL Intradermal Once    sodium chloride flush  5-40 mL Intravenous 2 times per day    sennosides-docusate sodium  2 tablet Oral BID    polyethylene glycol  17 g Oral Daily    [Held by provider] lisinopril  10 mg Oral Daily    dicyclomine  10 mg Oral 4x Daily AC & HS    insulin glargine  14 Units Subcutaneous Nightly    pantoprazole  40 mg Oral QAM AC    pravastatin  40 mg Oral Nightly    tamsulosin  0.4 mg Oral Nightly    sodium chloride flush  5-40 mL Intravenous 2 times per day    ceFAZolin (ANCEF) IVPB  2,000 mg Intravenous Q8H    heparin (porcine)  5,000 Units Subcutaneous 3 times per day    insulin lispro  0-12 Units Subcutaneous Q6H     Continuous Infusions:   sodium chloride      dextrose      sodium chloride       PRN Meds:.sodium chloride flush, sodium chloride, sodium chloride flush, acetaminophen, ondansetron **OR** ondansetron, labetalol, glucose, dextrose, glucagon (rDNA), dextrose, furosemide, sodium chloride, oxyCODONE, promethazine **OR** ondansetron, dextran 70-hypromellose      Allergies  Allergies   Allergen Reactions    Iv Contrast [Iodides] Other (See Comments)     Severe hypotension    Sulfa Antibiotics Hives         Vitals:    08/06/21 0500 08/06/21 0600 08/06/21 0700 08/06/21 0800   BP: 122/86 (!) 134/96  121/63   Pulse: 70 81 81 91   Resp: 16 15 14 14   Temp:    98.2 °F (36.8 °C)   TempSrc:    Oral   SpO2: 97% 95% 99% 100%   Weight:       Height:             Physical exam:         Physical Exam  Constitutional:       Appearance: placement  Continue postop antibiotic  Continue with Keppra for seizure prevention. Decadron per neurosurgery  PPI for GI protection.     Hyperkalemia  Hold lisinopril  Lokelma  Repeat renal panel later today      Diabetes mellitus  Continue home Lantus, monitor blood sugar    Essential hypertension  Holding home lisinopril due to hyperkalemia      DVT prophylaxis: Heparin  Kiley Hoover MD  8/6/2021

## 2021-08-06 NOTE — PROGRESS NOTES
Patient/family involved in developing goals and treatment plan: yes - pt and family    Pt goal: to get better    Subjective:  General  Chart Reviewed: Yes  Additional Pertinent Hx: Pt presented to [facility] ED s/p seizure, headache, facial droop, and dysarthria - w/u revealed SDH with midline shift + remote L cerebellar infarct and pt t/f'd to [facility] for NS management. Repeat head CT indicated increase in size of bleed and pt to OR 8/5/21 for crani + shunt. PMHx significant for brain mass with crani (1995, residual L weakness), deaf in L ear, DM, CA, HTN, and peptic ulcer. Family / Caregiver Present: Yes  General Comment  Comments: Baseline impulsivity per chart and family. Some memory concerns and recent decline in handwriting per family. Subjective  Subjective: Pt up in chair for evaluation, pleasant and cooperative.   Social/Functional History  Lives With: Spouse  Type of Home: Mobile home  Homemaking Responsibilities: Yes  Bill Paying/Finance Responsibility:  (shares with spouse; endorsed writing checks and handling cash although son-in-law indicated this was incorrect)  Health Care Management: Primary  Active : No  Education: Some college  Occupation: Retired  Type of occupation:  and   Leisure & [address]: enjoys going to the [employer], playing rummy  Vision  Vision: Impaired (glaucoma)  Vision Exceptions: Wears glasses at all times  Hearing  Hearing: Exceptions to [facility]  Hearing Exceptions:  (deaf in L ear)           Objective:     Oral/Motor  Oral Motor: Exceptions to [facility]  Labial ROM: Reduced left  Labial Symmetry: Abnormal symmetry left  Labial Strength: Reduced  Labial Sensation: Reduced (reduced awareness of spillage)    Auditory Comprehension  Comprehension: Within Functional Limits         Expression  Primary Mode of Expression: Verbal    Verbal Expression  Verbal Expression: Exceptions to functional limits  Initiation:  (limited verbal output; reduced initiation to express wants / needs)  Divergent: Mild (perseveration ratio of 0.27 for 15 items named)  Interfering Components: Perseverations    Written Expression  Dominant Hand: Right  Written Expression:  (spouse reported handwriting worsened over past 2 months)    Motor Speech  Motor Speech: Exceptions to [facility] (baseline per family and pt)  Intelligibility: Mild  Dysarthria : Moderate    Pragmatics/Social Functioning  Pragmatics: Exceptions to [facility]  Affect: Moderate (blunted)  Eye Contact: Mild (reduced visual attention to conversational partner)    Cognition:      Orientation  Overall Orientation Status: Impaired  Orientation Level: Oriented to situation;Oriented to person (erroneous identification of hospital, required max cues to use external stimuli)  Attention  Attention: Exceptions to [facility]  Sustained Attention: Mild  Memory  Memory: Exceptions to [facility]  Working Memory: Mild  Safety/Judgement  Safety/Judgement: Exceptions to [facility]  Unable to Self-monitor and Self-correct Consistently: Moderate (poor awareness of errors)  Insight: Moderate   (reduced awareness of deficits)  Flexibility of Thought: Mild (perseverations noted)    Additional Assessments:  Patient was administered portions of the [address] (RIP[employer] with the following results:  · Immediate Memory - mild    Clock drawing: severe; appeared exacerbated by visual impairments. Perseverations exhibited with reduced planning / organization.     Prognosis:  Speech Therapy Prognosis  Prognosis: Fair  Prognosis Considerations: Co-Morbidities;Previous Level of Function  Individuals consulted  Consulted and agree with results and recommendations: Patient;RN;Family member  Family member consulted: son-in-law and pt's spouse    Education:  Patient Education: Educated pt and family to role of SLP, purpose of visit, rationale for evaluation, results of assessment, impact SDH and crani can have on cognition, deficits noted, and recommendations  Patient Education Response: Verbalizes understanding;Needs reinforcement  Safety Devices in place: Yes  Type of devices: All fall risk precautions in place    Therapy Time:   Individual Concurrent Group Co-treatment   Time In 1100 0000 0000 0000   Time Out 1119 0000 0000 0000   Minutes 19 0 0 0   Variance: 0  Timed Code Treatment Minutes: 0 Minutes  Total Treatment Time: [address] [facility], [place] CCC-SLP; ROSE [id number]  Speech-Language Pathologist  Pager # [phone]  Phone # [phone]  Office # [phone]    If patient is discharged prior to next session, this note will serve as discharge summary.

## 2021-08-06 NOTE — PROGRESS NOTES
NEUROSURGERY PROGRESS NOTE    8/6/2021 10:01 AM                               Anabela Denny                      LOS: 1 day   POD#1  s/p Procedure(s) (LRB):  TREPHINE CRANIOTOMY FOR SUBDURAL EVACUATION (Right)  EXPOSE AND REPLACE PROGRAMMABLE SHUNT VALVE (Right)    Subjective: Patient sitting up in bed upon entering the room. No acute events overnight. Patient has no specific complaints this morning. Physical Exam:  Patient seen and examined    Vitals:    08/06/21 0700   BP:    Pulse: 81   Resp: 14   Temp:    SpO2: 99%     GCS:  4 - Opens eyes on own  5 - Alert and oriented  6 - Follows simple motor commands  General: Well developed. Alert and cooperative in no acute distress.     HENT: atraumatic, neck supple  Eyes: Optic discs: Not tested  Pulmonary: unlabored respiratory effort  Cardiovascular:  Warm well perfused. No peripheral edema  Gastrointestinal: abdomen soft, NT, ND     Neurological:  Mental Status: Awake, alert, oriented x 4, speech dysarthic  Attention: Intact  Language: Dysarthria (chronic)  Sensation: Intact to all extremities to light touch  Coordination: Intact     Cranial Nerves:  II: Visual acuity not tested, denies new visual changes / diplopia  III, IV, VI: PERRL, 3 mm bilaterally, EOMI, no nystagmus noted  V: Facial sensation intact bilaterally to touch  VII: Left side facial weakness (chronic)  VIII: Hearing intact bilaterally to spoken voice  IX: Palate movement equal bilaterally  XI: Shoulder shrug equal bilaterally  XII: Tongue midline     Musculoskeletal:   Gait: Not tested   Assist devices: None   Tone: Normal  Motor strength:     Right  Left      Right  Left    Deltoid  5 5   Hip Flex  5 5   Biceps  5 5   Knee Extensors  5 5   Triceps  5 5   Knee Flexors  5 5   Wrist Ext  5 5   Ankle Dorsiflex. 5 5   Wrist Flex  5 5   Ankle Plantarflex.   5 5   Handgrip  5 5   Ext Derrick Longus  5 5   Thumb Ext  5 5              Incision: CDI    Drain: 255 mL in past 24 hours    Radiological Findings:  CT HEAD WO CONTRAST  Result Date: 8/4/2021  Large right subdural hematoma with mixed attenuation suggesting acute on chronic or active bleeding. There is 7 mm right to left midline shift. Remote left cerebellar infarct.     CT Head WO Contrast  Result Date: 8/4/2021  Stable exam. Acute subdural hematoma along the right cerebral convexity measuring up to 17 mm in maximal thickness, stable. Trace acute subdural hematoma along the falx cerebri and the bilateral tentorium, stable. 7 mm right to left midline shift, stable. Stable right parietal approach ventricular catheter. No hydrocephalus. Encephalomalacia in the left cerebellar hemisphere and left middle cerebellar peduncle/left wyatt, stable      CT head without contrast  Result Date: 8/5/2021  Stable right subdural hematoma with leftward subfalcine herniation. Right-sided ventriculostomy catheter without hydrocephalus. Extensive sinus disease again noted. CT Head wo Contrast  Result Date: 8/5/2021  Status post right subdural drain placement with decreased right cerebral convexity subdural hematoma and decreased 2 mm leftward midline shift.       Labs:  Recent Labs     08/06/21  0504   WBC 7.0   HGB 12.3*   HCT 38.4*          Recent Labs     08/06/21  0504      K 5.6*      CO2 26   BUN 18   CREATININE 1.0   GLUCOSE 180*   CALCIUM 9.2   MG 1.90       Recent Labs     08/06/21  0504   PROTIME 14.0*   INR 1.23*   APTT 34.1       Patient Active Problem List    Diagnosis Date Noted    Subdural hematoma ([facility] [address] 08/04/2021    Tibial plateau fracture, left 04/06/2015    DM2 (diabetes mellitus, type 2) ([facility] [address] 04/06/2015    HTN (hypertension) 04/06/2015    High cholesterol 04/06/2015    Left knee pain 04/10/2014       Assessment:  Patient is a 77 y.o. male s/p Procedure(s) (LRB):  TREPHINE CRANIOTOMY FOR SUBDURAL EVACUATION (Right)  EXPOSE AND REPLACE PROGRAMMABLE SHUNT VALVE (Right) 2/2 stable acute subdural hematoma along the right cerebral convexity with brain compression and midline shift. .     Plan:  1. Neurologically stable  2. Neurologic exams frequency: Q4H  3. For change in exam MUST contact neurosurgery team along with critical care or primary team  4. SDH:  - s/p trephine crani for evacuation of SDH  - CT Head post-op shows decreased right cerebral convexity subdural hematoma and decreased brain compression and leftward midline shift. - Cefazolin x 6 doses post op, or while drain in place  - Continue drain. Drain open to gravity. Do NOT milk drain  - Incisional Care: Open to air. Wash incision daily with warm soapy water or shower daily, and pat dry with clean dry towel. Paint with CHG swab. - Maintain SBP <160; If PRN med insufficient, then may start Nicardipine infusion  - Keep Plt >100k & INR <1.4  - Hold all full dose anticoagulation & antiplatelet for 2 weeks  5. Cerebral edema:  - Keep Na within normal limits  - Keep HOB >30 degrees  - If central venous access is needed please use subclavian vs femoral - No IJ as this can decrease venous return and worsen cerebral edema  6. GI Prophylaxis: Pepcid  7. Seizure prophylaxis: Keppra 1000 mg BID  8. Bowel Regimen: Senokot-S and Glycolax  9. Pain control: PRN Roxicodone  10. Mobility:  - Advance as tolerates  - PT/OT consulted, appreciate recs  11. Diet: Advance as tolerates  12. DVT Prophylaxis: SCD's & SQ Heparin  13. Appreciate critical care team assistance in management  14. Will follow inpatient. Please call with any questions or decline in neurological status    DISPO: OK to transfer off ICU to  from neurosurgery standpoint. Dispo timing to be determined by primary team once patient is medically stable for discharge. Patient was seen and examined with Dr. Gianna Dixon who agrees with above assessment and plan.      Electronically signed by: KRISTAL Malave CNP, APRN-CNP, 8/6/2021 10:01 AM  [phone]

## 2021-08-06 NOTE — PROGRESS NOTES
ICU Progress Note    Admit Date: 8/5/2021  Day: 3  Vent Day: None  IV Access:Peripheral  IV Fluids:None  Vasopressors:None                Antibiotics: None  Diet: Diet NPO    CC: slurred speech, chronic facial droop, seizure    Interval history: Patient is POD1 s/p R craniotomy of subdural evacuation and expose and replace mechanical shunt valve. Patient is resting in no acute distress. Patient had an external catheter placed last night because of continued need to urinate. Patient is alert and oriented X4. Patient reports having no pain, sob, or discomfort. Patient reports no focal deficits. RICHA drain has drained 150 ml overnight of bloody fluid. There have been more clots in the tubing. HPI:   Roque Porras is a 76 y/o male w/ PMH of previous brain tumor s/p resection [address], prostate ca, DMII, peptic ulcer, HTN, HLD who presented to [facility] w/ slurred speech, left sided facial droop, and suspected seizure. The patient woke up from a nap at approximately 1330 and reported a headache that was occipital 4/10 dull, and achy that did not radiate. 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 The patient reports that his chronic facial droop is not any worse. EMS was called and the patient's wife reported that the patient had a 2 minute seizure prior to EMS arrival though on questioning in the ICU patient states that he did not have a shaking episode or LOC. The patient has a history of several mechanical falls with patient reporting that he has one about once a week. The patient's last fall was on Sunday. He uses a walker at baseline and has some chronic vision loss from glaucoma. Patient reports falling to the floor after tripping and hitting the back of his head. The patient recently started eliquis in June and has not missed any doses. The patient does not take an AEDs.  Patient has remote history of meningeal tumor removal in the [de-identified] w/ subsequent  shunt placed. The patients review of symptoms was otherwise normal.      At the [facility] ED a Ct head w/o contrast showed a \"large R subdural hematoma acute on chronic that was 1.5 cm in thickness w a 7 mm right to left midline shift\". Neurosurgery was consulted and recommended transfer to [facility] for medical management and observation. The patient's INR was 1.44, POC glucose was 183, Hgb was 12.7. The patients NIH stroke scale was an 8. While in the ED the patient was given 1.5 g Keppra and KCentra. Repeat CT head w/o contrast showed a slightly larger subdural hematoma that was 1.7 cm in thickness and stable 7 mm right to left midline shift. \" Since the patient is on eliquis and the subdural hematoma is slightly larger patient will be transferred to ICU for q1 hr neurochecks, blood pressure control, and further workup.      Medications:     Scheduled Meds:   levetiracetam  1,000 mg Intravenous Q12H    lisinopril  10 mg Oral Daily    dicyclomine  10 mg Oral 4x Daily AC & HS    insulin glargine  14 Units Subcutaneous Nightly    pantoprazole  40 mg Oral QAM AC    pravastatin  40 mg Oral Nightly    tamsulosin  0.4 mg Oral Nightly    sodium chloride flush  5-40 mL Intravenous 2 times per day    ceFAZolin (ANCEF) IVPB  2,000 mg Intravenous Q8H    methocarbamol IVPB  1,000 mg Intravenous Q8H    dexamethasone  4 mg Intravenous Q6H    heparin (porcine)  5,000 Units Subcutaneous 3 times per day    famotidine  20 mg Oral BID    insulin lispro  0-12 Units Subcutaneous Q6H     Continuous Infusions:   sodium chloride      dextrose      sodium chloride      sodium chloride 125 mL/hr at 08/05/21 1601     PRN Meds:sodium chloride flush, sodium chloride, acetaminophen, ondansetron **OR** ondansetron, labetalol, glucose, dextrose, glucagon (rDNA), dextrose, furosemide, sodium chloride, oxyCODONE, HYDROmorphone **OR** HYDROmorphone, promethazine **OR** ondansetron, dextran 70-hypromellose    Objective: Vitals:   T-max:  Patient Vitals for the past 8 hrs:   BP Temp Temp src Pulse Resp SpO2 Height Weight   08/06/21 0200 (!) 154/105 -- -- 75 10 97 % -- --   08/06/21 0100 (!) 145/91 -- -- 88 13 97 % -- --   08/06/21 0000 (!) 142/96 97.9 °F (36.6 °C) Oral 82 21 97 % 5' 10\" (1.778 m) 139 lb 1.8 oz (63.1 kg)   08/05/21 2300 137/84 -- -- 90 20 97 % -- --   08/05/21 2200 136/89 -- -- 87 13 99 % -- --   08/05/21 2100 137/85 -- -- 89 15 93 % -- --   08/05/21 2000 (!) 140/93 98.1 °F (36.7 °C) Oral 86 16 98 % -- --   08/05/21 1900 (!) 132/91 -- -- 81 8 96 % -- --       Intake/Output Summary (Last 24 hours) at 8/6/2021 0215  Last data filed at 8/6/2021 0000  Gross per 24 hour   Intake 970 ml   Output 1010 ml   Net -40 ml       Review of Systems   Constitutional: Negative for activity change and appetite change. HENT: Negative for congestion, postnasal drip and rhinorrhea. Respiratory: Negative for apnea, cough, chest tightness and shortness of breath. Cardiovascular: Negative for chest pain, palpitations and leg swelling. Gastrointestinal: Negative for abdominal distention, abdominal pain and constipation. Endocrine: Negative for polydipsia, polyphagia and polyuria. Genitourinary: Negative for difficulty urinating and flank pain. Musculoskeletal: Positive for gait problem. Skin: Negative for rash and wound. Neurological: Positive for seizures, facial asymmetry and speech difficulty. Hematological: Negative for adenopathy. Does not bruise/bleed easily. Psychiatric/Behavioral: Positive for confusion. Negative for agitation, behavioral problems, dysphoric mood and hallucinations. Physical Exam  Constitutional:       Appearance: Normal appearance. HENT:      Head:      Comments: Scars from R craniectomy seen; dressed w/ serosanguinous drainage      Nose: No congestion or rhinorrhea. Eyes:      Extraocular Movements: Extraocular movements intact.       Pupils: Pupils are equal, round, and reactive Final Result      Stable right subdural hematoma with leftward subfalcine herniation. Right-sided ventriculostomy catheter without hydrocephalus. Extensive sinus disease again noted. Assessment/Plan:   Jeimy Red is a 78 y/o male w/ PMH of previous meningeal brain tumors s/p resections in [facility] [address], afib, DMt2, HTN, HLD who presented to outside facility w/ slurred speech, L sided facial droop, and seizure. CT head w/o contrast found R 15mm subdural hematoma w/ 7 mm midline shift. Patient was transferred to [facility] where yesterday patient underwent craniotomy for subdural evacuation and exposure/replacement of old mechanical shunt valve. #Right Subdural hematoma  -CT head w/o contrast (8/4): 15 mm - 17mm right sided subdural hematoma w/ 7 mm midline shift. -CT head w/o contast (8/5):  16 mm right sided subdural hematoma w/ 6 mm midline shift  -Post-op CT head (8/5): s/p r parietal craniotomy w/ placement of an inferiorly directed subdural drain. Decreased 10 mm right sided subdural hematoma w/ improved 2 mm midline shift. -POD 1 s/p R craniotomy for subdural evacuation w/ expose/replace mechanical shunt valve   -Post operative:  Ancef 2g q8hrs for 3 doses and dexamethasone 4 mg q6hrs per neurosurgery  -Cont Keppra 1000mg BID for exposure prophylaxis  -SBP < 160  -Heparin started 5k units q8 hrs starting today at 1400 for DDT prophylaxis  -Removal of drain per neurosurgery    #Hyperkalemia  -K = 5.6, repeat normal after lokelma    #Hypertension  -keep SBP < 160 per neuro team  -Bps have been 130-150s/80s-100  -Patient's blood pressure in acceptable range w/out any antihypertensives, hold home medications for now    #HLD  -cont pravastatin 40 mg oral    #DMt2  -cont moderate dose sliding scal; blood glucose have been in 100-140s  -Resume Home lantus dose was 14 units nightly + SSI    #Atrial fibrillation  -Hold eliquis for at least two weeks due to SDH    Code Status: Full  FEN: Protonix 40 mg PO  PPX: SCDs  DISPO: ICU    Randolph Watkins MD, PGY-1  08/06/21  2:15 AM    This patient has been staffed and discussed with Juan Antonio Ochoa MD    [facility]     Patient seen and examined. I agree with Dr. Robbie Beckman history, physical, lab findings, assessment and plan and edited as needed. Patient doing well POD #1  Systolic blood pressure has ranged 100-1 30 today  Blood sugars are little bit high 180-270. He has been restarted on his home Lantus at 14 units plus sliding scale insulin.   Follow blood sugars closely in case Lantus needs to be increased  PT/OT saw patient today  Neurosurgery managing routine postop care including his RICHA drain  Continue Keppra for seizure prophylaxis  Continue lisinopril for blood pressure control  Ok to TXF to 5T with every 4 neurochecks    Juan Antonio Ochoa MD

## 2021-08-06 NOTE — PROGRESS NOTES
Occupational Therapy   Occupational Therapy Initial Assessment and Treatment    Date: 2021   Patient Name: Janice Gonsalves  MRN: [id number]     : 1945    Date of Service: 2021    Discharge Recommendations:  Janice Gonsalves scored a 16/24 on the AM-PAC ADL Inpatient form. Current research shows that an AM-PAC score of 17 or less is typically not associated with a discharge to the patient's home setting. Based on the patient's AM-PAC score and their current ADL deficits, it is recommended that the patient have 3-5 sessions per week of Occupational Therapy at d/c to increase the patient's independence. Please see assessment section for further patient specific details. If patient discharges prior to next session this note will serve as a discharge summary. Please see below for the latest assessment towards goals. OT Equipment Recommendations  Equipment Needed: No (defer recommendaitons to discharge facility)    Assessment   Performance deficits / Impairments: Decreased functional mobility ; Decreased ADL status; Decreased endurance;Decreased balance;Decreased fine motor control  Assessment: Functioning below baseline s/p TREPHINE CRANIOTOMY FOR SUBDURAL EVACUATION (Right Head) EXPOSE AND REPLACE PROGRAMMABLE SHUNT VALVE (Right Head). Pt with impaired standing balance and requiring Min/Mod A for short distance mobility. Pt is a fall risk at this time (and had a fall leading to admission). Pt will benefit from continued IP OT upon discharge to maximize safety and independence prior to returning home w/ spouse. Continue per POC. Treatment Diagnosis: impaired ADLs/transfers s/p TREPHINE CRANIOTOMY FOR SUBDURAL EVACUATION (Right Head) EXPOSE AND REPLACE PROGRAMMABLE SHUNT VALVE (Right Head).   Prognosis: Good  Decision Making: Medium Complexity  OT Education: OT Role;Plan of Care;ADL Adaptive Strategies;Precautions;Transfer Training  Patient Education: would benefit from continued education and reinforcement  REQUIRES OT FOLLOW UP: Yes  Activity Tolerance  Activity Tolerance: Patient Tolerated treatment well  Safety Devices  Safety Devices in place: Yes  Type of devices: Nurse notified; Left in chair;Chair alarm in place;Call light within reach (nurse in room to assess R wrist IV (appearing to be infiltrated and leaking))           Patient Diagnosis(es): There were no encounter diagnoses. has a past medical history of Arthritis, Brain tumor ([facility] [address] Cancer ([facility] [address] Deaf, Diabetes ([facility] [address] Diabetes mellitus ([facility] [address] Endocarditis, High blood pressure, and Peptic ulcer. has a past surgical history that includes tumor excision; Cardiac surgery; Appendectomy (age 4); Tonsillectomy (age 10); Upper gastrointestinal endoscopy (2013); craniotomy (Right, 8/5/2021); and craniotomy (Right, 8/5/2021). Treatment Diagnosis: impaired ADLs/transfers s/p TREPHINE CRANIOTOMY FOR SUBDURAL EVACUATION (Right Head) EXPOSE AND REPLACE PROGRAMMABLE SHUNT VALVE (Right Head). Restrictions  Position Activity Restriction  Other position/activity restrictions: Progressive Amb, HOB elevated 30 degrees    Subjective   General  Chart Reviewed: Yes  Additional Pertinent Hx: 77 y.o. M to ED w/ c/o slurred speech and left side facial droop. Hospital Course: Neurosurgery was consulted and recommended transfer to [facility] for medical management and observation;  CT Head: Large right subdural hematoma with mixed attenuation suggesting acute onchronic or active bleeding; Repeat Head CT: Stable right subdural hematoma with leftward subfalcine herniation; OR 8/5 for TREPHINE CRANIOTOMY FOR SUBDURAL EVACUATION (Right Head) EXPOSE AND REPLACE PROGRAMMABLE SHUNT VALVE (Right Head). PMH: brain tumor, diabetes, DVT, hypertension  Family / Caregiver Present: No  Referring Practitioner: NITA Murrieta  Diagnosis: Subdural Hematoma    Subjective  Subjective: In bed on entry. Agreeable to therapy.  Reports LUE weakness and facial droop are baseline s/p craniotomy in 1995. Social/Functional History  Social/Functional History  Lives With: Spouse  Type of Home: Mobile home  Home Layout: One level  Home Access: Stairs to enter with rails  Entrance Stairs - Number of Steps: 4-5 SPEEDY w/ rails; stair lift if needed  Bathroom Shower/Tub: Walk-in shower  Bathroom Toilet: Handicap height (vanity beside toilet)  Bathroom Equipment: Grab bars in shower  Bathroom Accessibility: Accessible  Home Equipment: 4 wheeled walker, Alert Button  ADL Assistance: [address]:  (wife primary)  Ambulation Assistance: Independent (using wh walker)  Transfer Assistance: Independent  Active : No  Patient's  Info: pt has glaucoma  Additional Comments: 3 falls in past 6 months (reports impulsive at baseline - from his disabiltiy); had previous Craniotomy in [address] [place] [address] (reports L-sided weakness, slurred speech and facial droop are baseline)       Objective   Vision: Impaired (glaucoma; had one cataract removed - in process of setting up having other eye taken care of at [place]; working on getting glasses from [place])  Hearing: Within functional limits      Orientation  Overall Orientation Status: Impaired  Orientation Level: Oriented to person;Disoriented to place; Disoriented to time;Disoriented to situation (August 4, 2021; thought he was at home, but remembered he had a fall; reorientation provided regarding situation)     Observation/Palpation  Observation: L facial droop and L eye droopiness (reports baseline)     Balance  Sitting Balance: Contact guard assistance (impulsive at EOB)  Standing Balance:  (CG/Min A)    Functional Mobility  Functional - Mobility Device: Rolling Walker  Activity: To/from bathroom  Assist Level:  Moderate assistance (Min/Mod A)  Functional Mobility Comments: Note: shuffled steps, cues to keep walker closer to body, assist for negotiation especially when turning    Toilet Transfers  Toilet - Technique: Ambulating (using wh walker)  Equipment Used: Standard toilet (w/ bar)  Toilet Transfer: Minimal assistance    ADL  Grooming: Minimal assistance (thoroughly wash face w/ warm washcloth)  LE Dressing: Minimal assistance  Toileting: Minimal assistance (using urinal in standing; steadying assist and assist for proper placement)     Coordination  Movements Are Fluid And Coordinated: No  Coordination and Movement description: Left UE;Decreased accuracy; Decreased speed  Quality of Movement Other  Comment: decreased accuracy w/ LUE finger opposition L; finger > nose to various targets (mildly decreased fluidity LUE); clumsiness w/ item handling L (ie donning glasses using L hand)        Bed mobility  Supine to Sit: Moderate assistance (HOB elevated)     Transfers  Sit to stand:  (Min A (from bed and toilet w/ bar); Mod A (from chair))  Stand to sit: Minimal assistance (decreased accurate reach w/ LUE to armrest of chair (nearly sitting on LUE))        Cognition  Overall Cognitive Status: Exceptions  Following Commands: Follows one step commands with repetition  Attention Span: Attends with cues to redirect  Safety Judgement: Decreased awareness of need for assistance;Decreased awareness of need for safety  Problem Solving: Decreased awareness of errors  Insights: Decreased awareness of deficits  Initiation: Requires cues for some  Cognition Comment: impulsive                    LUE AROM (degrees)  LUE General AROM: ~75% ROM shoulder flex, elbow to hand WFL  RUE AROM (degrees)  RUE AROM : WFL  LUE Strength  LUE Strength Comment: 5/5  RUE Strength  RUE Strength Comment: shoulder 4/5, elbow 5/5               Pt seen by OT for eval and treat.  Treatment included: bed mobility, functional transfer/mobility, ADL, pt education         Plan   Plan  Times per week: 5-7  Times per day: Daily  Current Treatment Recommendations: Balance Training, Functional Mobility Training, Endurance Training, Safety Education & Training, Neuromuscular Re-education, Equipment Evaluation, Education, & procurement, Self-Care / ADL                                                      AM-PAC Score        AM-PAC Inpatient Daily Activity Raw Score: 16 (08/06/21 1047)  AM-PAC Inpatient ADL T-Scale Score : 35.96 (08/06/21 1047)  ADL Inpatient CMS 0-100% Score: 53.32 (08/06/21 1047)  ADL Inpatient CMS G-Code Modifier : CK (08/06/21 1047)    Goals  Short term goals  Time Frame for Short term goals: Discharge  Short term goal 1: toilet transfer w/ CGA  Short term goal 2: LB dressing w/ CGA  Short term goal 3: grooming activity in standing, CGA  Short term goal 4: bed mobility w/ CGA  Patient Goals   Patient goals : no goal stated       Therapy Time   Individual Concurrent Group Co-treatment   Time In 0815         Time Out 0908         Minutes 53           Timed Code Treatment Minutes:   38    Total Treatment Minutes:  [facility] OTR/L #[id number]

## 2021-08-06 NOTE — PROGRESS NOTES
Patient continues to have bloody drainage from EVD that was placed on 08/05. Bloody drainage has resulted in clots in the tubing. Tubing was milked as well as possible. No change in neuro status or function. Patient denies headache. ICU team notified, and [employer] sent to  Dr. Vandana Hay.

## 2021-08-06 NOTE — PROGRESS NOTES
diet with close monitoring for s/s associated with aspiration or respiratory decline. Recommend initial SLP f/u to ensure diet tolerance and monitor for need for MBS. Dysphagia Outcome Severity Scale: Level 5: Mild dysphagia- Distant supervision. May need one diet consistency restricted     Treatment Plan  Requires SLP Intervention: Yes  Duration/Frequency of Treatment: 1-2x f/u for LOS  D/C Recommendations: To be determined       Recommended Diet and Intervention  Diet Solids Recommendation: Regular  Liquid Consistency Recommendation: Thin  Recommended Form of Meds: PO  Recommendations: Self feed;Dysphagia treatment  Therapeutic Interventions: Diet tolerance monitoring;Patient/Family education    Compensatory Swallowing Strategies  Compensatory Swallowing Strategies: Upright as possible for all oral intake; Alternate solids and liquids;Eat/Feed slowly; Small bites/sips; Check for pocketing of food on the Left;Remain upright for 30-45 minutes after meals    Treatment/Goals  Short-term Goals  Goal 1: Pt will consume recommended diet consistency w/o overt s/s associated with aspiration or respiratory decline. Pt goal: to get something to eat    General  Chart Reviewed: Yes  Comments: Pt presented to [facility] ED s/p seizure, headache, facial droop, and dysarthria - w/u revealed SDH with midline shift + remote L cerebellar infarct and pt t/f'd to [facility] for NS management. Repeat head CT indicated increase in size of bleed and pt to OR 8/5/21 for crani + shunt. PMHx significant for brain mass with crani (1995, residual L weakness), deaf in L ear, DM, CA, HTN, and peptic ulcer. Subjective  Subjective: Pt in chair, pleasant and cooperative. Family arrived during session. Behavior/Cognition: Alert; Cooperative;Pleasant mood  Temperature Spikes Noted: No  Respiratory Status: O2 via nasual cannula  Breath Sounds: Clear (per flowsheets)  O2 Device: Nasal cannula  Liters of Oxygen: 2 L  Communication Observation: Dysarthria (baseline per pt and family)  Follows Directions: Simple  Dentition: Some missing teeth; Adequate  Patient Positioning: Upright in chair  Baseline Vocal Quality: Weak (baseline per pt and family report)  Volitional Cough: Strong;Congested  Prior Dysphagia History: Oral dysphagia given chronic L facial droop s/p crani in 1995. Endorsed consuming regular diet at baseline  Consistencies Administered: Reg solid; Dysphagia Pureed (Dysphagia I); Thin - straw; Thin - cup; Ice Chips           Vision/Hearing  Vision  Vision: Impaired (glaucoma)  Vision Exceptions: Wears glasses at all times  Hearing  Hearing: Exceptions to [facility]  Hearing Exceptions:  (deaf in L ear)    Oral Motor Deficits  Oral/Motor  Oral Motor: Exceptions to [facility]  Labial ROM: Reduced left  Labial Symmetry: Abnormal symmetry left  Labial Strength: Reduced  Labial Sensation: Reduced (reduced awareness of spillage)    Oral Phase Dysfunction  Oral Phase  Oral Phase: Exceptions  Oral Phase Dysfunction  Spillage Left: All  Decreased Anterior to Posterior Transit: Reg solid  Lingual/Palatal Residue: Reg solid;Puree     Indicators of Pharyngeal Phase Dysfunction   Pharyngeal Phase  Pharyngeal Phase: Exceptions  Indicators of Pharyngeal Phase Dysfunction  Cough - Immediate: Puree  Cough - Delayed: Ice chips; Thin - cup    Prognosis  Prognosis  Prognosis for safe diet advancement:  (n/a - regular with thins recommended)  Barriers to reach goals: cognitive deficits  Barriers/Prognosis Comment: baseline dysphagia  Individuals consulted  Consulted and agree with results and recommendations: Patient;RN;Family member  Family member consulted: son-in-law and pt's spouse    Education  Patient Education: Educated pt and family to role of SLP, purpose of visit, rationale for evaluation, results of assessment, A&P of swallow, s/s and risks associated with aspiration, reflux, and recommendations.   Patient Education Response: Verbalizes understanding;Needs reinforcement  Safety Devices in place: Yes  Type of devices: All fall risk precautions in place       Therapy Time  SLP Individual Minutes  Time In: 1020  Time Out: [address]  Minutes: 20  SLP Co-Treatment Minutes  Time In: 0000  Time Out: 0000  Minutes: 0  SLP Total Treatment Time  Timed Code Treatment Minutes: 0 Minutes  Total Treatment Time: [address], [place] CCC-SLP; [id number]  Speech-Language Pathologist  Pager # [phone]  Phone # [phone]  Office # [phone]    If patient is discharged prior to next session, this note will serve as discharge summary.

## 2021-08-06 NOTE — PROGRESS NOTES
Clinical Pharmacy Consult Note    77 y.o. male admitted with a right subdural hematoma.  Pharmacy has been asked by Dr. Mathew Champion to adjust all drips to normal saline as appropriate based on compatibility to avoid fluid shifts since D5 is osmotically active.     The following intermittent IV drips/infusions have been adjusted to saline:  Cefazolin 2g  Levetiracetam 1g  Methocarbamol 1g    Please be aware that patient has D5W ordered as part of hypoglycemia orderset. Total IV fluid delivered to patient over last 24h: 1,713 mL    [facility] will follow daily to ensure all new IVPBs + drips are in NS. Thanks for consulting pharmacy! Please call with questions [phone]   Pharmacy Resident   8/6/2021 11:59 AM

## 2021-08-06 NOTE — PROGRESS NOTES
Physical Therapy    Facility/Department: [facility] ICU  Initial Assessment and Treatment    NAME: Génesis Alarcon  : 1945  MRN: [id number]    Date of Service: 2021    Discharge Recommendations:    Génesis Alarcon scored a 13/24 on the AM-PAC short mobility form. Current research shows that an AM-PAC score of 17 or less is typically not associated with a discharge to the patient's home setting. Based on the patient's AM-PAC score and their current functional mobility deficits, it is recommended that the patient have 3-5 sessions per week of Physical Therapy at d/c to increase the patient's independence. Please see assessment section for further patient specific details. If patient discharges prior to next session this note will serve as a discharge summary. Please see below for the latest assessment towards goals. PT Equipment Recommendations  Equipment Needed: No    Assessment   Body structures, Functions, Activity limitations: Decreased functional mobility ; Decreased safe awareness;Decreased endurance;Decreased balance  Assessment: Pt with decreased independent mboility from baseline s/p TREPHINE CRANIOTOMY FOR SUBDURAL EVACUATION. Pt reports normally independent with ambulation with rolling walker. Currently needing min to mod assist for bed mobility, transfers and gt short distance. At risk for falls and not safe to get up alone. Rec cont skilled PT to maximize mobility and independence  Treatment Diagnosis: impaired functional mobility 2/2 decreased balance and endurance  Decision Making: Medium Complexity  PT Education: Goals;PT Role;Plan of Care;General Safety; Functional Mobility Training  Patient Education: Pt verbalized understanding but may need reinforcement  REQUIRES PT FOLLOW UP: Yes       Patient Diagnosis(es): There were no encounter diagnoses.      has a past medical history of Arthritis, Brain tumor ([facility] [address] Cancer ([facility] [address] Deaf, Diabetes ([facility] [address] Diabetes mellitus ([facility] [address] Endocarditis, High blood pressure, and Peptic ulcer. has a past surgical history that includes tumor excision; Cardiac surgery; Appendectomy (age 4); Tonsillectomy (age 10); Upper gastrointestinal endoscopy (2013); craniotomy (Right, 8/5/2021); and craniotomy (Right, 8/5/2021). Restrictions  Position Activity Restriction  Other position/activity restrictions: Progressive Amb, HOB elevated 30 degrees  Vision/Hearing  Vision: Impaired (glaucoma; had one cataract removed - in process of setting up having other eye taken care of at [place]; working on getting glasses from [place])  Hearing: Within functional limits     Subjective  General  Chart Reviewed: Yes  Additional Pertinent Hx: Pt is a 77 y.o. male adm 8/5 with subdural hematoma. Pt presented to [facility] ED for headache, seizure, facial droop and dysarthria which began approximately 45 minutes prior to arrival. Head CT:Large right subdural hematoma with mixed attenuation suggesting acute on chronic or active bleeding. There is 7 mm right to left midline shift. Remote left cerebellar infarct. Transferred to [facility]   Repeat head CT:Stable right subdural hematoma with leftward subfalcine herniation. Pt s/p TREPHINE CRANIOTOMY FOR SUBDURAL EVACUATION on 8/5. PMH: arthritis, prostate CA, DM, HTN, brain tumor s/p removal 1983 and 1995  Referring Practitioner: NITA De La Paz  Referral Date : 08/05/21  Subjective  Subjective: Pt found supine, figidity in bed. Agreeable to PT. Speech slurred and difficult to understand at times. \"I need to pee. \"  Pain Screening  Patient Currently in Pain: Denies          Orientation     Social/Functional History  Social/Functional History  Lives With: Spouse  Type of Home: Mobile home  Home Layout: One level  Home Access: Stairs to enter with rails  Entrance Stairs - Number of Steps: 4-5 SPEEDY w/ rails; stair lift if needed  Bathroom Shower/Tub: Walk-in shower  Bathroom Toilet: Handicap height (vanity beside toilet)  Bathroom Equipment: Grab bars in shower  Bathroom Accessibility: Accessible  Home Equipment: 4 wheeled walker, Alert Button  ADL Assistance: [address]:  (wife primary)  Ambulation Assistance: Independent (using wh walker)  Transfer Assistance: Independent  Active : No  Patient's  Info: pt has glaucoma  Additional Comments: 3 falls in past 6 months (reports impulsive at baseline - from his disabiltiy); had previous Craniotomy in [address] [place] [address] (reports L-sided weakness, slurred speech and facial droop are baseline)  Cognition        Objective          AROM RLE (degrees)  RLE AROM: WFL  AROM LLE (degrees)  LLE AROM : WFL  Strength RLE  Strength RLE: WFL (>4+/5 grossly)  Strength LLE  Strength LLE: WFL (>4+/5 grossly)        Bed mobility  Supine to Sit: Moderate assistance (HOB elevated)  Transfers  Sit to Stand: Minimal Assistance (from bed, mod assist from chair)  Stand to sit: Minimal Assistance (cues for safety)  Bed to Chair: Minimal assistance (stand pivot with rolling walker)  Ambulation  Ambulation?: Yes  Ambulation 1  Device: Rolling Walker  Assistance: Minimal assistance (to  mod assist)  Quality of Gait: shuffles with decreased bilat step length/height, cues to stay within walker - pushes too far ahead, unsteady and impulsive at times  Distance: 3', 12' x 2          Treatment included: bed mobility, transfers, gt, pt education      Plan   Plan  Times per week: 5-7  Current Treatment Recommendations: Balance Training, Functional Mobility Training, Gait Training, Endurance Training, Patient/Caregiver Education & Training, Safety Education & Training  Safety Devices  Type of devices: Call light within reach, Chair alarm in place, Nurse notified, Left in chair    G-Code       OutComes Score                                                  AM-PAC Score  AM-PAC Inpatient Mobility Raw Score : 13 (08/06/21 1042)  AM-PAC Inpatient T-Scale Score : 36.74 (08/06/21 1042)  Mobility Inpatient CMS 0-100% Score: 64.91 (08/06/21 1042)  Mobility Inpatient CMS G-Code Modifier : CL (08/06/21 1042)          Goals  Short term goals  Time Frame for Short term goals: By discharge  Short term goal 1: Sup to sit SBA  Short term goal 2: Pt will transfer sit to stand SBA  Short term goal 3: Pt will amb >50' with LRAD SBA       Therapy Time   Individual Concurrent Group Co-treatment   Time In 0815         Time Out 0908         Minutes 53              Timed Code Treatment Minutes: 38      Total Treatment Minutes:  [address], PT

## 2021-08-06 NOTE — PROGRESS NOTES
Transfer From ICU to Medical Floor    Interval Hx:  Patient is POD1 s/p R craniotomy of subdural evacuation and expose and replace mechanical shunt valve. Patient is resting in no acute distress. Patient had an external catheter placed last night because of continued need to urinate. Patient is alert and oriented X4. Patient reports having no pain, sob, or discomfort. Patient reports no focal deficits. EVD has drained 150 ml overnight of bloody fluid. There have been more clots in the tubing. UPDATE: Patient returned to the OR for removal of drain. Patient is now stable and will be transferred to med-surg/      HPI:  Anabela Denny is a 27-year-old male with a past medical history of meningiomas status post resection in 1983 in [address] [place] [address], type II DM, CKD, diastolic CHF hypertension and hyperlipidemia who presented to [facility] complaining of slurred speech, left-sided facial droop and seizure activity according to wife. Pt had woken up in the afternoon with a headache , dull, 4/10, no radiation. He was seated in a chair watching a ball game when he started shaking his upper limbs which progressed to his lower limbs and this lasted approximately 1 minute and patient became unresponsive for about 10 mins. Patient started becoming responsive after EMS arrived. Patient reports a history of mechanical falls over the past 5 days. Pt reports being on eliquis. Denies vision changes, nausea, vomiting, chest pain, palpitations, shortness of breath.   [facility] ED patient had a CT scan without contrast which revealed a large right subdural hematoma acute on chronic which is 1.5 cm in thickness with 7 mm right to left midline shift. Was given Kcentra and Keppra in the ED. patient was transferred to [facility] ICU for every hour neuro checks and further management      The patient was seen and examined.      Vitals:     08/06/21 0500 08/06/21 0600 08/06/21 0700 08/06/21 0800   BP: 122/86 (!) 134/96   121/63 Pulse: 70 81 81 91   Resp: 16 15 14 14   Temp:       98.2 °F (36.8 °C)   TempSrc:       Oral   SpO2: 97% 95% 99% 100%       Physical Exam  · General appearance: drowsy, post-op. · HEENT: Head: posterior incision C/D/I; bloody drainage from area of removed drain  · Lungs: clear to auscultation bilaterally  · Heart: regular rate and rhythm, S1, S2 normal, no M/R/G  · Abdomen: soft, non-tender; bowel sounds normal  · Extremities: lower extremities normal, atraumatic, no cyanosis or edema; R-UE: edema  · Neurologic: CN II-XII grossly intact.   Mental status:oriented, thought content appropriate; drowsy since post-op        Intake/Output Summary (Last 24 hours) at 8/6/2021 1342  Last data filed at 8/6/2021 0600  Gross per 24 hour   Intake 1729 ml   Output 1055 ml   Net 674 ml     Lab Results   Component Value Date    CREATININE 1.0 08/06/2021    BUN 18 08/06/2021     08/06/2021    K 5.1 08/06/2021     08/06/2021    CO2 26 08/06/2021     Lab Results   Component Value Date    WBC 7.0 08/06/2021    HGB 12.3 (L) 08/06/2021    HCT 38.4 (L) 08/06/2021    MCV 85.3 08/06/2021     08/06/2021          Scheduled Meds:   sodium zirconium cyclosilicate  5 g Oral TID    levETIRAcetam  1,000 mg Oral BID    lidocaine 1 % injection  5 mL Intradermal Once    sodium chloride flush  5-40 mL Intravenous 2 times per day    sennosides-docusate sodium  2 tablet Oral BID    polyethylene glycol  17 g Oral Daily    [Held by provider] lisinopril  10 mg Oral Daily    dicyclomine  10 mg Oral 4x Daily AC & HS    insulin glargine  14 Units Subcutaneous Nightly    pantoprazole  40 mg Oral QAM AC    pravastatin  40 mg Oral Nightly    tamsulosin  0.4 mg Oral Nightly    sodium chloride flush  5-40 mL Intravenous 2 times per day    ceFAZolin (ANCEF) IVPB  2,000 mg Intravenous Q8H    heparin (porcine)  5,000 Units Subcutaneous 3 times per day    insulin lispro  0-12 Units Subcutaneous Q6H     Continuous Infusions:   sodium chloride      dextrose      sodium chloride       PRN Meds:sodium chloride flush, sodium chloride, sodium chloride flush, acetaminophen, ondansetron **OR** ondansetron, labetalol, glucose, dextrose, glucagon (rDNA), dextrose, furosemide, sodium chloride, oxyCODONE, promethazine **OR** ondansetron, dextran 70-hypromellose     Assessment and Plan:     Please see ICU progress note as well as neurosurgery note from today 8/6/21 for A/P. The objective and subjective findings as well as the ICU course of treatment have been reviewed with the ICU team. The treatment plan has been reviewed with the ICU team. The patient is being transferred to [place] [address] in stable condition.     Vidhya Fees PGY-1

## 2021-08-06 NOTE — PLAN OF CARE
Problem: Skin Integrity:  Goal: Will show no infection signs and symptoms  Description: Will show no infection signs and symptoms  8/6/2021 0003 by Benigno Gamble RN  Outcome: Ongoing  8/5/2021 1754 by Rubens Can RN  Outcome: Ongoing  Goal: Absence of new skin breakdown  Description: Absence of new skin breakdown  8/6/2021 0003 by Benigno Gamble RN  Outcome: Ongoing  8/5/2021 1754 by Rubens Can RN  Outcome: Ongoing     Problem: Falls - Risk of:  Goal: Will remain free from falls  Description: Will remain free from falls  8/6/2021 0003 by Benigno Gamble RN  Outcome: Ongoing  8/5/2021 1754 by Rubens Can RN  Outcome: Ongoing  Goal: Absence of physical injury  Description: Absence of physical injury  8/6/2021 0003 by Benigno Gamble RN  Outcome: Ongoing  8/5/2021 1754 by Rubens Can RN  Outcome: Ongoing     Problem: HEMODYNAMIC STATUS  Goal: Patient has stable vital signs and fluid balance  8/6/2021 0003 by Benigno Gamble RN  Outcome: Ongoing  8/5/2021 1754 by Rubens Can RN  Outcome: Ongoing     Problem: ACTIVITY INTOLERANCE/IMPAIRED MOBILITY  Goal: Mobility/activity is maintained at optimum level for patient  8/5/2021 1754 by Rubens Can RN  Outcome: Ongoing

## 2021-08-06 NOTE — PROGRESS NOTES
Spoke with [facility] [employer]. Pt only receiving Ancef IV for 4 more doses. PICC line not indicated. Ultrasound guided IV able to be placed in Right forearm with excellent blood return and excellent ease of flush.

## 2021-08-06 NOTE — CARE COORDINATION
I spoke with patient after talking to therapy and patient will need SNF at d/c. We discussed this and he has been to [facility] for rehab in the past and wanted me to make a referral there. I tried to call his wife to discuss this with her but was unable to reach her. Referral sent to [facility] to review. Will need [employer] precert for placement.      Electronically signed by Leeanna Rodriguez RN on 8/6/2021 at 10:17 AM  [phone]

## 2021-08-06 NOTE — PROGRESS NOTES
Hospital Medicine Care  Progress Note      Chief complain; SDH          Subjective:     Sleepy, as seen post op  Denies any HA  Denies any nausea, emesis  Afebrile    Review of Systems:     Review of Systems as mentioned above, other ros is negative. Objective:       Medications        Scheduled Meds:   sodium zirconium cyclosilicate  5 g Oral TID    levetiracetam  1,000 mg Intravenous Q12H    [Held by provider] lisinopril  10 mg Oral Daily    dicyclomine  10 mg Oral 4x Daily AC & HS    insulin glargine  14 Units Subcutaneous Nightly    pantoprazole  40 mg Oral QAM AC    pravastatin  40 mg Oral Nightly    tamsulosin  0.4 mg Oral Nightly    sodium chloride flush  5-40 mL Intravenous 2 times per day    ceFAZolin (ANCEF) IVPB  2,000 mg Intravenous Q8H    dexamethasone  4 mg Intravenous Q6H    heparin (porcine)  5,000 Units Subcutaneous 3 times per day    insulin lispro  0-12 Units Subcutaneous Q6H     Continuous Infusions:   dextrose      sodium chloride       PRN Meds:.sodium chloride flush, acetaminophen, ondansetron **OR** ondansetron, labetalol, glucose, dextrose, glucagon (rDNA), dextrose, furosemide, sodium chloride, oxyCODONE, HYDROmorphone **OR** HYDROmorphone, promethazine **OR** ondansetron, dextran 70-hypromellose      Allergies  Allergies   Allergen Reactions    Iv Contrast [Iodides] Other (See Comments)     Severe hypotension    Sulfa Antibiotics Hives         Vitals:    08/06/21 0400 08/06/21 0500 08/06/21 0600 08/06/21 0700   BP: 99/68 122/86 (!) 134/96    Pulse: 69 70 81 81   Resp: 14 16 15 14   Temp: 98 °F (36.7 °C)      TempSrc: Oral      SpO2: 95% 97% 95% 99%   Weight:       Height:             Physical exam:         Physical Exam  Constitutional:       Appearance: Normal appearance. HENT:      Head: Normocephalic and atraumatic. Comments: Drain in place. Cardiovascular:      Rate and Rhythm: Normal rate and regular rhythm. Pulses: Normal pulses.       Heart sounds: Normal heart sounds. Pulmonary:      Effort: Pulmonary effort is normal.      Breath sounds: Normal breath sounds. Abdominal:      General: Abdomen is flat. Palpations: Abdomen is soft. Musculoskeletal:         General: Normal range of motion. Skin:     General: Skin is warm and dry. Capillary Refill: Capillary refill takes less than 2 seconds. Neurological:      General: No focal deficit present. Mental Status: He is alert and oriented to person, place, and time. Psychiatric:         Mood and Affect: Mood normal.         Behavior: Behavior normal.               Labs      Recent Labs     08/04/21  1446 08/05/21  0546 08/06/21  0504   WBC 8.1 7.9 7.0   HGB 12.7* 12.7* 12.3*   HCT 39.1* 38.7* 38.4*    122* 139   MCV 83.0 84.6 85.3        Recent Labs     08/04/21  1446 08/05/21  0546 08/06/21  0504    141 141   K 4.6 4.6 5.6*   CL 99 105 106   CO2 25 27 26   BUN 15 13 18   CREATININE 1.2 1.0 1.0       Recent Labs     08/04/21  1446   AST 15   ALT 12   BILITOT 0.6   ALKPHOS 128       Recent Labs     08/06/21  0504   MG 1.90       Radiology  CT Head wo Contrast   Final Result      1. Status post right subdural drain placement with decreased right cerebral convexity subdural hematoma and decreased 2 mm leftward midline shift. CT head without contrast   Final Result      Stable right subdural hematoma with leftward subfalcine herniation. Right-sided ventriculostomy catheter without hydrocephalus. Extensive sinus disease again noted. Assessment and Plan: Active Problems:    Subdural hematoma (HCC)  Resolved Problems:    * No resolved hospital problems. *     SDH  S/p craniotomy and drain placement  Continue postop antibiotic  Continue with Keppra for seizure prevention. Decadron per neurosurgery  PPI for GI protection.     Diabetes mellitus  Continue home Lantus, monitor blood sugar    Essential hypertension  Continue home medications      DVT prophylaxis: Heparin  Hunter Stevenson MD  8/6/2021

## 2021-08-07 LAB
ANION GAP SERPL CALCULATED.3IONS-SCNC: 12 MMOL/L (ref 3–16)
BASOPHILS ABSOLUTE: 0 K/UL (ref 0–0.2)
BASOPHILS RELATIVE PERCENT: 0.2 %
BUN BLDV-MCNC: 31 MG/DL (ref 7–20)
CALCIUM SERPL-MCNC: 8.8 MG/DL (ref 8.3–10.6)
CHLORIDE BLD-SCNC: 103 MMOL/L (ref 99–110)
CO2: 23 MMOL/L (ref 21–32)
CREAT SERPL-MCNC: 1.2 MG/DL (ref 0.8–1.3)
EOSINOPHILS ABSOLUTE: 0 K/UL (ref 0–0.6)
EOSINOPHILS RELATIVE PERCENT: 0.1 %
ESTIMATED AVERAGE GLUCOSE: 151.3 MG/DL
ESTIMATED AVERAGE GLUCOSE: 154.2 MG/DL
GFR AFRICAN AMERICAN: >60
GFR NON-AFRICAN AMERICAN: 59
GLUCOSE BLD-MCNC: 108 MG/DL (ref 70–99)
GLUCOSE BLD-MCNC: 130 MG/DL (ref 70–99)
GLUCOSE BLD-MCNC: 160 MG/DL (ref 70–99)
GLUCOSE BLD-MCNC: 162 MG/DL (ref 70–99)
GLUCOSE BLD-MCNC: 93 MG/DL (ref 70–99)
HBA1C MFR BLD: 6.9 %
HBA1C MFR BLD: 7 %
HCT VFR BLD CALC: 50.1 % (ref 40.5–52.5)
HEMOGLOBIN: 16.2 G/DL (ref 13.5–17.5)
LYMPHOCYTES ABSOLUTE: 1.2 K/UL (ref 1–5.1)
LYMPHOCYTES RELATIVE PERCENT: 10.5 %
MAGNESIUM: 2.1 MG/DL (ref 1.8–2.4)
MCH RBC QN AUTO: 27.1 PG (ref 26–34)
MCHC RBC AUTO-ENTMCNC: 32.3 G/DL (ref 31–36)
MCV RBC AUTO: 84.1 FL (ref 80–100)
MONOCYTES ABSOLUTE: 0.7 K/UL (ref 0–1.3)
MONOCYTES RELATIVE PERCENT: 5.9 %
NEUTROPHILS ABSOLUTE: 9.6 K/UL (ref 1.7–7.7)
NEUTROPHILS RELATIVE PERCENT: 83.3 %
PDW BLD-RTO: 14.7 % (ref 12.4–15.4)
PERFORMED ON: ABNORMAL
PERFORMED ON: NORMAL
PLATELET # BLD: 101 K/UL (ref 135–450)
PMV BLD AUTO: 9 FL (ref 5–10.5)
POTASSIUM SERPL-SCNC: 4.7 MMOL/L (ref 3.5–5.1)
RBC # BLD: 5.96 M/UL (ref 4.2–5.9)
SODIUM BLD-SCNC: 138 MMOL/L (ref 136–145)
WBC # BLD: 11.5 K/UL (ref 4–11)

## 2021-08-07 PROCEDURE — 36415 COLL VENOUS BLD VENIPUNCTURE: CPT

## 2021-08-07 PROCEDURE — 6360000002 HC RX W HCPCS: Performed by: STUDENT IN AN ORGANIZED HEALTH CARE EDUCATION/TRAINING PROGRAM

## 2021-08-07 PROCEDURE — 83735 ASSAY OF MAGNESIUM: CPT

## 2021-08-07 PROCEDURE — 2580000003 HC RX 258: Performed by: STUDENT IN AN ORGANIZED HEALTH CARE EDUCATION/TRAINING PROGRAM

## 2021-08-07 PROCEDURE — 1200000000 HC SEMI PRIVATE

## 2021-08-07 PROCEDURE — 6370000000 HC RX 637 (ALT 250 FOR IP): Performed by: STUDENT IN AN ORGANIZED HEALTH CARE EDUCATION/TRAINING PROGRAM

## 2021-08-07 PROCEDURE — 85025 COMPLETE CBC W/AUTO DIFF WBC: CPT

## 2021-08-07 PROCEDURE — 83036 HEMOGLOBIN GLYCOSYLATED A1C: CPT

## 2021-08-07 PROCEDURE — 80048 BASIC METABOLIC PNL TOTAL CA: CPT

## 2021-08-07 RX ADMIN — CEFAZOLIN 2000 MG: 10 INJECTION, POWDER, FOR SOLUTION INTRAVENOUS at 12:36

## 2021-08-07 RX ADMIN — Medication 10 ML: at 08:05

## 2021-08-07 RX ADMIN — LEVETIRACETAM 1000 MG: 500 TABLET ORAL at 08:02

## 2021-08-07 RX ADMIN — INSULIN LISPRO 2 UNITS: 100 INJECTION, SOLUTION INTRAVENOUS; SUBCUTANEOUS at 21:45

## 2021-08-07 RX ADMIN — PRAVASTATIN SODIUM 40 MG: 40 TABLET ORAL at 20:30

## 2021-08-07 RX ADMIN — HEPARIN SODIUM 5000 UNITS: 5000 INJECTION INTRAVENOUS; SUBCUTANEOUS at 14:29

## 2021-08-07 RX ADMIN — POLYETHYLENE GLYCOL 3350 17 G: 17 POWDER, FOR SOLUTION ORAL at 08:03

## 2021-08-07 RX ADMIN — DICYCLOMINE HYDROCHLORIDE 10 MG: 10 CAPSULE ORAL at 06:16

## 2021-08-07 RX ADMIN — LEVETIRACETAM 1000 MG: 500 TABLET ORAL at 20:30

## 2021-08-07 RX ADMIN — DICYCLOMINE HYDROCHLORIDE 10 MG: 10 CAPSULE ORAL at 10:40

## 2021-08-07 RX ADMIN — PANTOPRAZOLE SODIUM 40 MG: 40 TABLET, DELAYED RELEASE ORAL at 06:16

## 2021-08-07 RX ADMIN — DOCUSATE SODIUM 50 MG AND SENNOSIDES 8.6 MG 2 TABLET: 8.6; 5 TABLET, FILM COATED ORAL at 08:01

## 2021-08-07 RX ADMIN — DICYCLOMINE HYDROCHLORIDE 10 MG: 10 CAPSULE ORAL at 20:30

## 2021-08-07 RX ADMIN — DOCUSATE SODIUM 50 MG AND SENNOSIDES 8.6 MG 2 TABLET: 8.6; 5 TABLET, FILM COATED ORAL at 20:30

## 2021-08-07 RX ADMIN — CEFAZOLIN 2000 MG: 10 INJECTION, POWDER, FOR SOLUTION INTRAVENOUS at 04:30

## 2021-08-07 RX ADMIN — INSULIN GLARGINE 14 UNITS: 100 INJECTION, SOLUTION SUBCUTANEOUS at 21:45

## 2021-08-07 RX ADMIN — HEPARIN SODIUM 5000 UNITS: 5000 INJECTION INTRAVENOUS; SUBCUTANEOUS at 21:45

## 2021-08-07 RX ADMIN — Medication 10 ML: at 08:04

## 2021-08-07 RX ADMIN — TAMSULOSIN HYDROCHLORIDE 0.4 MG: 0.4 CAPSULE ORAL at 20:30

## 2021-08-07 RX ADMIN — HEPARIN SODIUM 5000 UNITS: 5000 INJECTION INTRAVENOUS; SUBCUTANEOUS at 06:16

## 2021-08-07 RX ADMIN — LISINOPRIL 10 MG: 10 TABLET ORAL at 08:02

## 2021-08-07 RX ADMIN — INSULIN LISPRO 2 UNITS: 100 INJECTION, SOLUTION INTRAVENOUS; SUBCUTANEOUS at 16:19

## 2021-08-07 RX ADMIN — DICYCLOMINE HYDROCHLORIDE 10 MG: 10 CAPSULE ORAL at 16:27

## 2021-08-07 ASSESSMENT — PAIN SCALES - GENERAL
PAINLEVEL_OUTOF10: 0

## 2021-08-07 NOTE — PROGRESS NOTES
8/7/21    POD#3    R SDH    Drain is still  Putting out a fair amount  Will leave in place  L sided facial droop persists.     Salome Posadas MD

## 2021-08-07 NOTE — PLAN OF CARE
Problem: Skin Integrity:  Goal: Will show no infection signs and symptoms  Description: Will show no infection signs and symptoms  8/7/2021 0003 by Mckay Jacobo RN  Outcome: Ongoing  8/6/2021 1937 by Mesha Rollins RN  Outcome: Ongoing  Goal: Absence of new skin breakdown  Description: Absence of new skin breakdown  8/7/2021 0003 by Mckay Jacobo RN  Outcome: Ongoing  8/6/2021 1937 by Mesha Rollins RN  Outcome: Ongoing     Problem: Falls - Risk of:  Goal: Will remain free from falls  Description: Will remain free from falls  8/7/2021 0003 by Mckay Jacobo RN  Outcome: Ongoing  8/6/2021 1937 by Mesha Rollins RN  Outcome: Ongoing  Goal: Absence of physical injury  Description: Absence of physical injury  8/7/2021 0003 by Mkcay Jacobo RN  Outcome: Ongoing  8/6/2021 1937 by Mesha Rollins RN  Outcome: Ongoing     Problem: HEMODYNAMIC STATUS  Goal: Patient has stable vital signs and fluid balance  8/6/2021 1937 by Mesha Rollins RN  Outcome: Ongoing     Problem: ACTIVITY INTOLERANCE/IMPAIRED MOBILITY  Goal: Mobility/activity is maintained at optimum level for patient  8/6/2021 1937 by Mesha Rollins RN  Outcome: Ongoing

## 2021-08-07 NOTE — PROGRESS NOTES
Patient has no neuro change and denies a headache. Patients vitals remain stable, and denies any pain. Patient is currently completing lunch with a visitor. Will continue to monitor.

## 2021-08-07 NOTE — PLAN OF CARE
Problem: Skin Integrity:  Goal: Will show no infection signs and symptoms  Description: Will show no infection signs and symptoms  Outcome: Ongoing  Goal: Absence of new skin breakdown  Description: Absence of new skin breakdown  Outcome: Ongoing     Patient is at an increased risk for skin breakdown, see Alen Score. Pressure ulcer prevention measures in place per protocol. Pt assisted to turn q2 hrs and PRN with pillow support. Complete head to toe skin assessment qshift. No new skin breakdown noted. Preventative sacral heart dressing in place over intact sacral skin. Skin kept clean/dry. Proper transferring/positioning in bed to prevent friction/shear. Heels floated off of bed and foot of bed elevated. Low air loss and alternating pressure mattress in use. Problem: Falls - Risk of:  Goal: Will remain free from falls  Description: Will remain free from falls  Outcome: Ongoing  Goal: Absence of physical injury  Description: Absence of physical injury  Outcome: Ongoing     Patient is at an increased risk for falls, see Vides Fall Score. Fall precautions in place per protocol. Fall cloth at foot of bed, fall band around wrist and nonskid footwear in place. Patient instructed to call for assistance by using nurse call light before attempting to get out of bed. Bed is locked and in lowest position with side rails up 3/4. Bed alarm engaged. Room door left open. Belongings and call light within reach. Hourly visual safety checks in place.     Problem: HEMODYNAMIC STATUS  Goal: Patient has stable vital signs and fluid balance  Outcome: Ongoing     Problem: ACTIVITY INTOLERANCE/IMPAIRED MOBILITY  Goal: Mobility/activity is maintained at optimum level for patient  Outcome: Ongoing

## 2021-08-07 NOTE — PROGRESS NOTES
Progress Note    Admit Date: 8/5/2021  Day: 2   Diet: ADULT DIET; Regular    CC on presentation: slurred speech, chronic facial droop, seizure    Interval history:   - no overnight events  - patient doing well post-op  - denies h/a, dizziness, lightheadedness; SOB, chest pain  - bloody drainage continues from RICHA drain  - 3L O2 NC    HPI:   Milena Kowalski is a 81-year-old male with a past medical history of meningiomas status post resection in 1983 in [address] [place] [address], type II DM, CKD, diastolic CHF hypertension and hyperlipidemia who presented to [facility] complaining of slurred speech, left-sided facial droop and seizure activity according to wife.  Pt had woken up in the afternoon with a headache , dull, 4/10, no radiation. He was seated in a chair watching a ball game when he started shaking his upper limbs which progressed to his lower limbs and this lasted approximately 1 minute and patient became unresponsive for about 10 mins.  Patient started becoming responsive after EMS arrived. Patient reports a history of mechanical falls over the past 5 days. Pt reports being on eliquis. Denies vision changes, nausea, vomiting, chest pain, palpitations, shortness of breath.   5 [facility] ED patient had a CT scan without contrast which revealed a large right subdural hematoma acute on chronic which is 1.5 cm in thickness with 7 mm right to left midline shift. Was given Kcentra and Keppra in the ED. patient was transferred to [facility] ICU for every hour neuro checks and further management. Repeat head CT w/o contrast showed a slightly larger subdural hematoma that was 1.7 cm in thickness and stable 7 mm right to L midline shift.       8/5: OR to drain SDH via neurosurgery; expose and replace programmable shunt valve    8/6: ok to transfer from ICU to [facility]    Medications:     Scheduled Meds:   levETIRAcetam  1,000 mg Oral BID    lidocaine 1 % injection  5 mL Intradermal Once    sodium chloride flush  5-40 mL Intravenous 2 times per day    sennosides-docusate sodium  2 tablet Oral BID    polyethylene glycol  17 g Oral Daily    lisinopril  10 mg Oral Daily    dicyclomine  10 mg Oral 4x Daily AC & HS    insulin glargine  14 Units Subcutaneous Nightly    pantoprazole  40 mg Oral QAM AC    pravastatin  40 mg Oral Nightly    tamsulosin  0.4 mg Oral Nightly    sodium chloride flush  5-40 mL Intravenous 2 times per day    ceFAZolin (ANCEF) IVPB  2,000 mg Intravenous Q8H    heparin (porcine)  5,000 Units Subcutaneous 3 times per day    insulin lispro  0-12 Units Subcutaneous Q6H     Continuous Infusions:   sodium chloride      dextrose      sodium chloride       PRN Meds:sodium chloride flush, sodium chloride, sodium chloride flush, acetaminophen, ondansetron **OR** ondansetron, labetalol, glucose, dextrose, glucagon (rDNA), dextrose, furosemide, sodium chloride, oxyCODONE, promethazine **OR** ondansetron, dextran 70-hypromellose    Objective:   Vitals:   T-max:  Patient Vitals for the past 8 hrs:   BP Temp Temp src Pulse Resp SpO2   08/07/21 0800 -- 96.6 °F (35.9 °C) Axillary -- -- --   08/07/21 0600 107/66 -- -- 66 16 100 %   08/07/21 0500 111/85 -- -- 72 14 100 %   08/07/21 0400 106/63 98.2 °F (36.8 °C) Oral 83 17 100 %   08/07/21 0300 115/71 -- -- 81 12 98 %   08/07/21 0200 -- -- -- 78 18 100 %   08/07/21 0100 114/80 -- -- 74 16 98 %       Intake/Output Summary (Last 24 hours) at 8/7/2021 0803  Last data filed at 8/7/2021 0300  Gross per 24 hour   Intake 1441 ml   Output 925 ml   Net 516 ml       Review of Systems  As per Interval hx    Physical Exam  Constitutional:       Comments: Right side facial droop   HENT:      Head:      Comments: Posterior incision is C/D/I; bloody drainage from area of removed drain  Cardiovascular:      Rate and Rhythm: Normal rate. Rhythm irregular. Pulmonary:      Breath sounds: Normal breath sounds. Abdominal:      Palpations: Abdomen is soft.    Skin:     General: Skin is warm.   Neurological:      Mental Status: He is alert and oriented to person, place, and time. Psychiatric:         Mood and Affect: Mood normal.         LABS:    CBC:   Recent Labs     08/05/21  0546 08/06/21  0504 08/07/21  0454   WBC 7.9 7.0 11.5*   HGB 12.7* 12.3* 16.2   HCT 38.7* 38.4* 50.1   * 139 101*   MCV 84.6 85.3 84.1     Renal:    Recent Labs     08/06/21  0504 08/06/21  0504 08/06/21  0952 08/06/21  1642 08/07/21  0454     --   --  138 138   K 5.6*   < > 5.1 4.5 4.7     --   --  101 103   CO2 26  --   --  27 23   BUN 18  --   --  26* 31*   CREATININE 1.0  --   --  1.2 1.2   GLUCOSE 180*  --   --  276* 130*   CALCIUM 9.2  --   --  9.4 8.8   MG 1.90  --   --   --  2.10   PHOS  --   --   --  2.9  --    ANIONGAP 9  --   --  10 12    < > = values in this interval not displayed. Hepatic:   Recent Labs     08/04/21  1446 08/06/21  1642   AST 15  --    ALT 12  --    BILITOT 0.6  --    PROT 6.6  --    LABALBU 3.6 3.5   ALKPHOS 128  --      Troponin: No results for input(s): TROPONINI in the last 72 hours. BNP: No results for input(s): BNP in the last 72 hours. Lipids:   Recent Labs     08/05/21  0546   CHOL 123   HDL 37*     ABGs:  No results for input(s): PHART, KFQ2IKL, PO2ART, SOS5YEZ, BEART, THGBART, S5UPQZHK, FDI8OYJ in the last 72 hours. INR:   Recent Labs     08/04/21  1446 08/05/21  0546 08/06/21  0504   INR 1.44* 1.28* 1.23*     Lactate: No results for input(s): LACTATE in the last 72 hours. Cultures:  -----------------------------------------------------------------  RAD:   CT Head wo Contrast   Final Result      1. Status post right subdural drain placement with decreased right cerebral convexity subdural hematoma and decreased 2 mm leftward midline shift. CT head without contrast   Final Result      Stable right subdural hematoma with leftward subfalcine herniation. Right-sided ventriculostomy catheter without hydrocephalus.       Extensive sinus disease again noted.                Assessment/Plan:   Patient is a 78 y/o M s/p craniotomy for subdural evacuation (right side), expose and replace programmable shunt valve (right side) 2/2 stable acute subdural hematoma along the right cerebral convexity with shift. 1. S/P craniotomy for subdural evacuation 2/2 stable right-sided SDH  - neurosurgery on board; the following are their recommendations  - onofre exam q4 hours  - if change in exam or new deficit - call neurosurg and repeat CTH    SDH  - cefazolin x 6 doses post-op, or while drain in place  - Continue drain. Drain open to gravity. Do NOT milk drain  - Incisional Care: Open to air. Wash incision daily with warm soapy water or shower daily, and pat dry with clean dry towel. Paint with CHG swab. - Maintain SBP <160; If PRN med insufficient, then may start Nicardipine infusion  - Keep Plt >100k & INR <1.4  - Hold all full dose anticoagulation & antiplatelet for 2 weeks    Cerebral edema:  - Keep Na within normal limits  - Keep HOB >30 degrees  - If central venous access is needed please use subclavian vs femoral - No IJ as this can decrease venous return and worsen cerebral edema    GI Prophylaxis: Pepcid  Seizure prophylaxis: Keppra 1000 mg BID  Bowel Regimen: Senokot-S and Glycolax  Pain control: PRN Roxicodone  Mobility:  - Advance as tolerates  - PT/OT consulted, appreciate recs  Diet: Advance as tolerates  DVT Prophylaxis: SCD's & SQ Heparin    2. Hyperkalemia  - K today 4.7    3. HTN  - Keep SBP < 160 per neuro team  - lisinopril restarted  - labetalol 10 mg PRN     4. T2DM  - insulin glargine (Lantus)   - insulin lispro     5.  Atrial Fibrillation   - hold Eliquis for at least 2 weeks d/t SDH    Code Status: Full Code  FEN: Adult diet - regular  PPX: Keppra, Protonix, SCDs and Heparin   DISPO: DEBBIE Hillman MD, PGY-1  08/07/21  8:03 AM    This patient has been staffed and discussed with Satnam Krishnan MD.

## 2021-08-08 LAB
ANION GAP SERPL CALCULATED.3IONS-SCNC: 8 MMOL/L (ref 3–16)
BASOPHILS ABSOLUTE: 0.1 K/UL (ref 0–0.2)
BASOPHILS RELATIVE PERCENT: 1 %
BUN BLDV-MCNC: 27 MG/DL (ref 7–20)
CALCIUM SERPL-MCNC: 9.1 MG/DL (ref 8.3–10.6)
CHLORIDE BLD-SCNC: 106 MMOL/L (ref 99–110)
CO2: 28 MMOL/L (ref 21–32)
CORTISOL TOTAL: 19.3 UG/DL
CREAT SERPL-MCNC: 1.1 MG/DL (ref 0.8–1.3)
EKG ATRIAL RATE: 258 BPM
EKG DIAGNOSIS: NORMAL
EKG Q-T INTERVAL: 360 MS
EKG QRS DURATION: 72 MS
EKG QTC CALCULATION (BAZETT): 428 MS
EKG R AXIS: -3 DEGREES
EKG T AXIS: 3 DEGREES
EKG VENTRICULAR RATE: 85 BPM
EOSINOPHILS ABSOLUTE: 0 K/UL (ref 0–0.6)
EOSINOPHILS RELATIVE PERCENT: 0 %
GFR AFRICAN AMERICAN: >60
GFR NON-AFRICAN AMERICAN: >60
GLUCOSE BLD-MCNC: 113 MG/DL (ref 70–99)
GLUCOSE BLD-MCNC: 127 MG/DL (ref 70–99)
GLUCOSE BLD-MCNC: 169 MG/DL (ref 70–99)
GLUCOSE BLD-MCNC: 173 MG/DL (ref 70–99)
GLUCOSE BLD-MCNC: 184 MG/DL (ref 70–99)
HCT VFR BLD CALC: 36.3 % (ref 40.5–52.5)
HEMOGLOBIN: 11.7 G/DL (ref 13.5–17.5)
LACTIC ACID: 1.3 MMOL/L (ref 0.4–2)
LYMPHOCYTES ABSOLUTE: 1.2 K/UL (ref 1–5.1)
LYMPHOCYTES RELATIVE PERCENT: 16 %
MAGNESIUM: 1.8 MG/DL (ref 1.8–2.4)
MCH RBC QN AUTO: 27.3 PG (ref 26–34)
MCHC RBC AUTO-ENTMCNC: 32.3 G/DL (ref 31–36)
MCV RBC AUTO: 84.5 FL (ref 80–100)
MONOCYTES ABSOLUTE: 0.6 K/UL (ref 0–1.3)
MONOCYTES RELATIVE PERCENT: 8 %
NEUTROPHILS ABSOLUTE: 5.4 K/UL (ref 1.7–7.7)
NEUTROPHILS RELATIVE PERCENT: 75 %
PDW BLD-RTO: 14.8 % (ref 12.4–15.4)
PERFORMED ON: ABNORMAL
PLATELET # BLD: 180 K/UL (ref 135–450)
PMV BLD AUTO: 8.9 FL (ref 5–10.5)
POTASSIUM SERPL-SCNC: 4.3 MMOL/L (ref 3.5–5.1)
RBC # BLD: 4.3 M/UL (ref 4.2–5.9)
SODIUM BLD-SCNC: 142 MMOL/L (ref 136–145)
TROPONIN: <0.01 NG/ML
WBC # BLD: 7.2 K/UL (ref 4–11)

## 2021-08-08 PROCEDURE — 83036 HEMOGLOBIN GLYCOSYLATED A1C: CPT

## 2021-08-08 PROCEDURE — 2580000003 HC RX 258: Performed by: STUDENT IN AN ORGANIZED HEALTH CARE EDUCATION/TRAINING PROGRAM

## 2021-08-08 PROCEDURE — 83735 ASSAY OF MAGNESIUM: CPT

## 2021-08-08 PROCEDURE — 97130 THER IVNTJ EA ADDL 15 MIN: CPT

## 2021-08-08 PROCEDURE — 80048 BASIC METABOLIC PNL TOTAL CA: CPT

## 2021-08-08 PROCEDURE — 93010 ELECTROCARDIOGRAM REPORT: CPT | Performed by: INTERNAL MEDICINE

## 2021-08-08 PROCEDURE — 85025 COMPLETE CBC W/AUTO DIFF WBC: CPT

## 2021-08-08 PROCEDURE — 97129 THER IVNTJ 1ST 15 MIN: CPT

## 2021-08-08 PROCEDURE — 82533 TOTAL CORTISOL: CPT

## 2021-08-08 PROCEDURE — 97116 GAIT TRAINING THERAPY: CPT

## 2021-08-08 PROCEDURE — 6370000000 HC RX 637 (ALT 250 FOR IP): Performed by: INTERNAL MEDICINE

## 2021-08-08 PROCEDURE — 97530 THERAPEUTIC ACTIVITIES: CPT

## 2021-08-08 PROCEDURE — 6370000000 HC RX 637 (ALT 250 FOR IP): Performed by: STUDENT IN AN ORGANIZED HEALTH CARE EDUCATION/TRAINING PROGRAM

## 2021-08-08 PROCEDURE — 93005 ELECTROCARDIOGRAM TRACING: CPT | Performed by: STUDENT IN AN ORGANIZED HEALTH CARE EDUCATION/TRAINING PROGRAM

## 2021-08-08 PROCEDURE — 36415 COLL VENOUS BLD VENIPUNCTURE: CPT

## 2021-08-08 PROCEDURE — 83605 ASSAY OF LACTIC ACID: CPT

## 2021-08-08 PROCEDURE — 84484 ASSAY OF TROPONIN QUANT: CPT

## 2021-08-08 PROCEDURE — 6360000002 HC RX W HCPCS: Performed by: STUDENT IN AN ORGANIZED HEALTH CARE EDUCATION/TRAINING PROGRAM

## 2021-08-08 PROCEDURE — 97535 SELF CARE MNGMENT TRAINING: CPT

## 2021-08-08 PROCEDURE — 2060000000 HC ICU INTERMEDIATE R&B

## 2021-08-08 RX ORDER — SODIUM CHLORIDE, SODIUM LACTATE, POTASSIUM CHLORIDE, AND CALCIUM CHLORIDE .6; .31; .03; .02 G/100ML; G/100ML; G/100ML; G/100ML
1000 INJECTION, SOLUTION INTRAVENOUS ONCE
Status: COMPLETED | OUTPATIENT
Start: 2021-08-08 | End: 2021-08-08

## 2021-08-08 RX ORDER — SODIUM CHLORIDE, SODIUM LACTATE, POTASSIUM CHLORIDE, AND CALCIUM CHLORIDE .6; .31; .03; .02 G/100ML; G/100ML; G/100ML; G/100ML
1000 INJECTION, SOLUTION INTRAVENOUS ONCE
Status: DISCONTINUED | OUTPATIENT
Start: 2021-08-08 | End: 2021-08-08

## 2021-08-08 RX ORDER — INSULIN LISPRO 100 [IU]/ML
0-12 INJECTION, SOLUTION INTRAVENOUS; SUBCUTANEOUS
Status: DISCONTINUED | OUTPATIENT
Start: 2021-08-08 | End: 2021-08-11 | Stop reason: HOSPADM

## 2021-08-08 RX ADMIN — DICYCLOMINE HYDROCHLORIDE 10 MG: 10 CAPSULE ORAL at 06:30

## 2021-08-08 RX ADMIN — DICYCLOMINE HYDROCHLORIDE 10 MG: 10 CAPSULE ORAL at 16:16

## 2021-08-08 RX ADMIN — HEPARIN SODIUM 5000 UNITS: 5000 INJECTION INTRAVENOUS; SUBCUTANEOUS at 06:30

## 2021-08-08 RX ADMIN — HEPARIN SODIUM 5000 UNITS: 5000 INJECTION INTRAVENOUS; SUBCUTANEOUS at 13:20

## 2021-08-08 RX ADMIN — INSULIN LISPRO 2 UNITS: 100 INJECTION, SOLUTION INTRAVENOUS; SUBCUTANEOUS at 16:45

## 2021-08-08 RX ADMIN — Medication 10 ML: at 09:24

## 2021-08-08 RX ADMIN — INSULIN GLARGINE 14 UNITS: 100 INJECTION, SOLUTION SUBCUTANEOUS at 20:17

## 2021-08-08 RX ADMIN — PRAVASTATIN SODIUM 40 MG: 40 TABLET ORAL at 20:10

## 2021-08-08 RX ADMIN — DICYCLOMINE HYDROCHLORIDE 10 MG: 10 CAPSULE ORAL at 20:10

## 2021-08-08 RX ADMIN — TAMSULOSIN HYDROCHLORIDE 0.4 MG: 0.4 CAPSULE ORAL at 20:10

## 2021-08-08 RX ADMIN — HEPARIN SODIUM 5000 UNITS: 5000 INJECTION INTRAVENOUS; SUBCUTANEOUS at 20:10

## 2021-08-08 RX ADMIN — INSULIN LISPRO 2 UNITS: 100 INJECTION, SOLUTION INTRAVENOUS; SUBCUTANEOUS at 20:18

## 2021-08-08 RX ADMIN — LEVETIRACETAM 1000 MG: 500 TABLET ORAL at 20:09

## 2021-08-08 RX ADMIN — DOCUSATE SODIUM 50 MG AND SENNOSIDES 8.6 MG 2 TABLET: 8.6; 5 TABLET, FILM COATED ORAL at 09:24

## 2021-08-08 RX ADMIN — PANTOPRAZOLE SODIUM 40 MG: 40 TABLET, DELAYED RELEASE ORAL at 06:30

## 2021-08-08 RX ADMIN — DICYCLOMINE HYDROCHLORIDE 10 MG: 10 CAPSULE ORAL at 11:17

## 2021-08-08 RX ADMIN — LEVETIRACETAM 1000 MG: 500 TABLET ORAL at 09:24

## 2021-08-08 RX ADMIN — INSULIN LISPRO 2 UNITS: 100 INJECTION, SOLUTION INTRAVENOUS; SUBCUTANEOUS at 11:54

## 2021-08-08 RX ADMIN — SODIUM CHLORIDE, SODIUM LACTATE, POTASSIUM CHLORIDE, AND CALCIUM CHLORIDE 1000 ML: .6; .31; .03; .02 INJECTION, SOLUTION INTRAVENOUS at 07:58

## 2021-08-08 RX ADMIN — POLYETHYLENE GLYCOL 3350 17 G: 17 POWDER, FOR SOLUTION ORAL at 09:24

## 2021-08-08 RX ADMIN — Medication 10 ML: at 09:26

## 2021-08-08 RX ADMIN — ACETAMINOPHEN 650 MG: 325 TABLET ORAL at 06:30

## 2021-08-08 ASSESSMENT — PAIN SCALES - GENERAL
PAINLEVEL_OUTOF10: 0
PAINLEVEL_OUTOF10: 0
PAINLEVEL_OUTOF10: 3
PAINLEVEL_OUTOF10: 0

## 2021-08-08 NOTE — PROGRESS NOTES
Physical Therapy  Facility/Department: [facility] 5T ORTHO/NEURO  Daily Treatment Note  NAME: Jaylen Serra  : 1945  MRN: [id number]    Date of Service: 2021    Discharge Recommendations:    Jaylen Serra scored a 16/24 on the AM-PAC short mobility form. Current research shows that an AM-PAC score of 17 or less is typically not associated with a discharge to the patient's home setting. Based on the patient's AM-PAC score and their current functional mobility deficits, it is recommended that the patient have 3-5 sessions per week of Physical Therapy at d/c to increase the patient's independence. Please see assessment section for further patient specific details. If patient discharges prior to next session this note will serve as a discharge summary. Please see below for the latest assessment towards goals. PT Equipment Recommendations  Equipment Needed: No    Assessment   Body structures, Functions, Activity limitations: Decreased functional mobility ; Decreased safe awareness;Decreased endurance;Decreased balance  Assessment: Decreased assist for transfers. Increased gt endurance. Pt needing CG/min assist for transfers and min to mod assist for gt with walker. At risk for falls and not safe to ambulate alone. Rec cont skilled PT to maximize mobility and independence  Treatment Diagnosis: impaired functional mobility 2/2 decreased balance and endurance  PT Education: Goals;PT Role;Plan of Care;General Safety; Functional Mobility Training  Patient Education: Pt verbalized understanding but may need reinforcement  REQUIRES PT FOLLOW UP: Yes     Patient Diagnosis(es): There were no encounter diagnoses. has a past medical history of Arthritis, Brain tumor ([address] Cancer ([address] Deaf, Diabetes ([facility] [address] Diabetes mellitus ([address] Endocarditis, High blood pressure, and Peptic ulcer. has a past surgical history that includes tumor excision; Cardiac surgery; Appendectomy (age 4); Tonsillectomy (age 10);  Upper gastrointestinal endoscopy (2013); craniotomy (Right, 8/5/2021); and craniotomy (Right, 8/5/2021). Restrictions  Position Activity Restriction  Other position/activity restrictions: Progressive Amb, HOB elevated 30 degrees  Subjective   General  Chart Reviewed: Yes  Additional Pertinent Hx: Pt is a 77 y.o. male adm 8/5 with subdural hematoma. Pt presented to [facility] ED for headache, seizure, facial droop and dysarthria which began approximately 45 minutes prior to arrival. Head CT:Large right subdural hematoma with mixed attenuation suggesting acute on chronic or active bleeding. There is 7 mm right to left midline shift. Remote left cerebellar infarct. Transferred to [facility]   Repeat head CT:Stable right subdural hematoma with leftward subfalcine herniation. Pt s/p TREPHINE CRANIOTOMY FOR SUBDURAL EVACUATION on 8/5. PMH: arthritis, prostate CA, DM, HTN, brain tumor s/p removal 1983 and 1995  Subjective  Subjective: Pt found supine. Agreeable to PT. Speech slurred and difficult to understand at times. Pain Screening  Patient Currently in Pain: Denies  Vital Signs  Patient Currently in Pain: Denies       Orientation     Cognition      Objective   Bed mobility  Supine to Sit: Stand by assistance (HOB elevated with rail)  Transfers  Sit to Stand: Minimal Assistance (from bed, CGA from chair)  Stand to sit: Contact guard assistance  Ambulation  Ambulation?: Yes  Ambulation 1  Device: [address]:  Moderate assistance (progressing to min assist)  Quality of Gait: shuffles with decreased bilat step length/height, cues to stay within walker - pushes too far ahead, unsteady and impulsive at times  Distance: 30', 15'     Balance  Standing - Static:  (Pt stood with walker with CG/min assist)                           G-Code     OutComes Score                                                     AM-PAC Score  AM-PAC Inpatient Mobility Raw Score : 16 (08/08/21 8015)  AM-PAC Inpatient T-Scale Score : 40.78 (08/08/21 1305)  Mobility Inpatient CMS 0-100% Score: 54.16 (08/08/21 1305)  Mobility Inpatient CMS G-Code Modifier : CK (08/08/21 1305)          Goals  Short term goals  Time Frame for Short term goals: By discharge  Short term goal 1: Sup to sit SBA. Ongoing  Short term goal 2: Pt will transfer sit to stand SBA. Ongoing  Short term goal 3: Pt will amb >50' with LRAD SBA.   Ongoing    Plan    Plan  Times per week: 5-7  Current Treatment Recommendations: Balance Training, Functional Mobility Training, Gait Training, Endurance Training, Patient/Caregiver Education & Training, Safety Education & Training  Safety Devices  Type of devices: Call light within reach, Chair alarm in place, Nurse notified, Left in chair     Therapy Time   Individual Concurrent Group Co-treatment   Time In 1151         Time Out 1230         Minutes 39              Timed Code Treatment Minutes:39       Total Treatment Minutes:  [address],

## 2021-08-08 NOTE — PROGRESS NOTES
Speech Language Pathology    Chart reviewed. Attempted to see pt for dysphagia tx in addition to cognitive linguistic tx. Pt politely declining all PO trials d/t fullness. Will re-attempt another time as pt willing to participate and schedule permits. No documented difficulty on current diet consistency per chart; pt remains afebrile on RA with lungs clear per flowsheets.     Yaritza Bueno MA CCC-SLP; [id number]  Speech-Language Pathologist  Pager # [phone]  Phone # [phone]  Office # [phone]

## 2021-08-08 NOTE — PROGRESS NOTES
Occupational Therapy  Facility/Department: [facility] 5T ORTHO/NEURO  Daily Treatment Note  NAME: Milena Kowalski  : 1945  MRN: [id number]    Date of Service: 2021    Discharge Recommendations:Fred Aj scored a 15/24 on the AM-PAC ADL Inpatient form. Current research shows that an AM-PAC score of 17 or less is typically not associated with a discharge to the patient's home setting. Based on the patient's AM-PAC score and their current ADL deficits, it is recommended that the patient have 3-5 sessions per week of Occupational Therapy at d/c to increase the patient's independence. Please see assessment section for further patient specific details. If patient discharges prior to next session this note will serve as a discharge summary. Please see below for the latest assessment towards goals. OT Equipment Recommendations  Equipment Needed: No (defer recommendaitons to discharge facility)    Assessment   Performance deficits / Impairments: Decreased functional mobility ; Decreased ADL status; Decreased endurance;Decreased balance;Decreased fine motor control  Assessment: Functioning below baseline s/p TREPHINE CRANIOTOMY FOR SUBDURAL EVACUATION (Right Head) EXPOSE AND REPLACE PROGRAMMABLE SHUNT VALVE (Right Head). Pt with impaired standing balance and requiring physical assistance for short distance mobility, and self-care task. Pt may benefit from continued IP OT upon discharge to maximize safety and independence prior to returning home w/ spouse. Continue per POC. Treatment Diagnosis: impaired ADLs/transfers s/p TREPHINE CRANIOTOMY FOR SUBDURAL EVACUATION (Right Head) EXPOSE AND REPLACE PROGRAMMABLE SHUNT VALVE (Right Head).   Prognosis: Good  OT Education: OT Role;Plan of Care;ADL Adaptive Strategies;Precautions;Transfer Training  Patient Education: would benefit from continued education and reinforcement  REQUIRES OT FOLLOW UP: Yes  Activity Tolerance  Activity Tolerance: Patient Tolerated treatment well  Safety Devices  Safety Devices in place: Yes  Type of devices: Nurse notified; Left in chair;Chair alarm in place;Call light within reach         Patient Diagnosis(es): There were no encounter diagnoses. has a past medical history of Arthritis, Brain tumor ([facility] [address] Cancer ([facility] [address] Deaf, Diabetes ([facility] [address] Diabetes mellitus ([facility] [address] Endocarditis, High blood pressure, and Peptic ulcer. has a past surgical history that includes tumor excision; Cardiac surgery; Appendectomy (age 4); Tonsillectomy (age 10); Upper gastrointestinal endoscopy (2013); craniotomy (Right, 8/5/2021); and craniotomy (Right, 8/5/2021). Restrictions  Position Activity Restriction  Other position/activity restrictions: Progressive Amb, HOB elevated 30 degrees  Subjective   General  Chart Reviewed: Yes  Additional Pertinent Hx: 77 y.o. M to ED w/ c/o slurred speech and left side facial droop. Hospital Course: Neurosurgery was consulted and recommended transfer to [facility] for medical management and observation;  CT Head: Large right subdural hematoma with mixed attenuation suggesting acute onchronic or active bleeding; Repeat Head CT: Stable right subdural hematoma with leftward subfalcine herniation; OR 8/5 for TREPHINE CRANIOTOMY FOR SUBDURAL EVACUATION (Right Head) EXPOSE AND REPLACE PROGRAMMABLE SHUNT VALVE (Right Head). PMH: brain tumor, diabetes, DVT, hypertension  Family / Caregiver Present: No  Referring Practitioner: NITA Sharif  Diagnosis: Subdural Hematoma  Subjective  Subjective: Pt semi-supine on OT/PT approach and agreed to tx.  Pt w/ lunch - agreed to up to chair for rest of meal  Vital Signs  Patient Currently in Pain: Denies   Orientation  Orientation  Overall Orientation Status: Within Functional Limits  Objective    ADL  LE Dressing: Minimal assistance (donning pull-up brief)  Toileting: Minimal assistance (standing at toilet w/ RW)           Bed mobility  Supine to Sit: Stand by assistance (HOB elevated with rail) Plan   Plan  Times per week: 5-7  Times per day: Daily  Current Treatment Recommendations: Balance Training, Functional Mobility Training, Endurance Training, Safety Education & Training, Neuromuscular Re-education, Equipment Evaluation, Education, & procurement, Self-Care / ADL    AM-PAC Score        AM-PAC Inpatient Daily Activity Raw Score: 15 (08/08/21 1444)  AM-PAC Inpatient ADL T-Scale Score : 34.69 (08/08/21 1444)  ADL Inpatient CMS 0-100% Score: 56.46 (08/08/21 1444)  ADL Inpatient CMS G-Code Modifier : CK (08/08/21 1444)    Goals  Short term goals  Time Frame for Short term goals: Discharge  Short term goal 1: toilet transfer w/ CGA- 8/8 cont  Short term goal 2: LB dressing w/ CGA- 8/8 cont  Short term goal 3: grooming activity in standing, CGA- 8/8 cont  Short term goal 4: bed mobility w/ CGA- 8/8 met  Patient Goals   Patient goals : no goal stated       Therapy Time   Individual Concurrent Group Co-treatment   Time In 0481         Time Out 1230         Minutes 39         Timed Code Treatment Minutes: [address] [facility], [facility]-OTR/L [id number]

## 2021-08-08 NOTE — PROGRESS NOTES
Pt resting in bed. Pt A/Ox4, moderate strength in all extremities. Pupils equal and reactive. Speech slurred with left side facial droop. Pt denies pain/ headache. VSS and afebrile. Surgical incision clean and intact, head drain to gravity with sanguinous output. Call light and belongings within reach.

## 2021-08-08 NOTE — PROGRESS NOTES
Patient is alert and oriented x3 - disoriented to place. VSS on room air. No change in neuro status this shift. NIH remains a 3 d/t slurred speech and left sided facial droop. No complaints of pain at this time. Surgical site is clean, dry, and intact - cleansed with soap and water and CHG swab per orders. Head drain to gravity draining moderate amount of serosanguinous fluid. Up to the chair this shift with PT/OT and tolerated well x2 with walker and gb. Incontinent at times. Medium bm this shift. Voiding adequately. Tolerating diet well. All fall precautions in place.

## 2021-08-08 NOTE — PLAN OF CARE
Problem: Falls - Risk of:  Goal: Will remain free from falls  Description: Will remain free from falls  8/8/2021 0531 by Giselle Vasquez RN  Outcome: Ongoing   Pt has been free from falls this shift, bed alarm on, bed in lowest position, 2/4 side rails up, nonskid socks on, wheels locked, bedside table and call light in reach. Encouraged pt to call out if needed anything. Problem: Skin Integrity:  Goal: Will show no infection signs and symptoms  Description: Will show no infection signs and symptoms  8/8/2021 0531 by Giselle Vasquez RN  Outcome: Ongoing   Pt is being turned and repositioned q2h with pillow support. Pt has scattered bruising and abrasions. Buttocks is intact with no evidence of skin breakdown. Sacral heart in place. Will continue to assess skin integrity.

## 2021-08-08 NOTE — PROGRESS NOTES
bedside report done with Colin. Pt In bed and denies needs. Pt NIH 3 for facial droop and aphasia. VSS. Pt is A/O x4. Bed alarm on, wheels locked, bed in lowest position, side rails up 2/4, nonskid socks on, call light and bedside table in reach. Will continue to monitor.

## 2021-08-08 NOTE — PROGRESS NOTES
Pt transferred to [address] with all belongings. VSS and neuro assessment stable. Multiple attempts to reach and update wife with no answer/ response. 5T RN, Shy Hebert, informed.

## 2021-08-08 NOTE — PROGRESS NOTES
4 Eyes Admission Assessment     I agree as the admission nurse that 2 RN's have performed a thorough Head to Toe Skin Assessment on the patient. ALL assessment sites listed below have been assessed on admission. Areas assessed by both nurses:    [x]   Head, Face, and Ears   [x]   Shoulders, Back, and Chest  [x]   Arms, Elbows, and Hands   [x]   Coccyx, Sacrum, and Ischium  [x]   Legs, Feet, and Heels        Does the Patient have Skin Breakdown?   No         Alen Prevention initiated:  No   Wound Care Orders initiated:  No      [facility] nurse consulted for Pressure Injury (Stage 3,4, Unstageable, DTI, NWPT, and Complex wounds) or Alen score 18 or lower:  No      Nurse 1 eSignature: Electronically signed by Bharati Carranza RN on 8/8/21 at 5:45 AM EDT    **SHARE this note so that the co-signing nurse is able to place an eSignature**    Nurse 2 eSignature: Electronically signed by Hemanth Mcdonald RN on 8/8/21 at 5:46 AM EDT

## 2021-08-08 NOTE — PROGRESS NOTES
Progress Note    Admit Date: 8/5/2021  Day: 2   Diet: ADULT DIET; Regular    CC on presentation: slurred speech, chronic facial droop, seizure    Interval history:   Became briefly hypotensive to 50/40 on manual check by nurse. Was asymptomatic. Given 1 liter. Now normotensive. HPI:   Jaylen Serra is a 64-year-old male with a past medical history of meningiomas status post resection in 1983 in [address] [place] [address],[facility], type II DM, CKD, diastolic CHF hypertension and hyperlipidemia who presented to [facility] complaining of slurred speech, left-sided facial droop and seizure activity according to wife.  Pt had woken up in the afternoon with a headache , dull, 4/10, no radiation. He was seated in a chair watching a ball game when he started shaking his upper limbs which progressed to his lower limbs and this lasted approximately 1 minute and patient became unresponsive for about 10 mins.  Patient started becoming responsive after EMS arrived. Patient reports a history of mechanical falls over the past 5 days. Pt reports being on eliquis. Denies vision changes, nausea, vomiting, chest pain, palpitations, shortness of breath.   5 [facility] ED patient had a CT scan without contrast which revealed a large right subdural hematoma acute on chronic which is 1.5 cm in thickness with 7 mm right to left midline shift. Was given Kcentra and Keppra in the ED. patient was transferred to [facility] ICU for every hour neuro checks and further management. Repeat head CT w/o contrast showed a slightly larger subdural hematoma that was 1.7 cm in thickness and stable 7 mm right to L midline shift.       8/5: OR to drain SDH via neurosurgery; expose and replace programmable shunt valve    8/6: ok to transfer from ICU to [facility]    Medications:     Scheduled Meds:   insulin lispro  0-12 Units Subcutaneous 4x Daily AC & HS    levETIRAcetam  1,000 mg Oral BID    lidocaine 1 % injection  5 mL Intradermal Once    sodium chloride flush  5-40 mL Intravenous 2 times per day    sennosides-docusate sodium  2 tablet Oral BID    polyethylene glycol  17 g Oral Daily    lisinopril  10 mg Oral Daily    dicyclomine  10 mg Oral 4x Daily AC & HS    insulin glargine  14 Units Subcutaneous Nightly    pantoprazole  40 mg Oral QAM AC    pravastatin  40 mg Oral Nightly    tamsulosin  0.4 mg Oral Nightly    sodium chloride flush  5-40 mL Intravenous 2 times per day    heparin (porcine)  5,000 Units Subcutaneous 3 times per day     Continuous Infusions:   sodium chloride      dextrose      sodium chloride       PRN Meds:sodium chloride flush, sodium chloride, sodium chloride flush, acetaminophen, ondansetron **OR** ondansetron, labetalol, glucose, dextrose, glucagon (rDNA), dextrose, furosemide, sodium chloride, oxyCODONE, promethazine **OR** ondansetron, dextran 70-hypromellose    Objective:   Vitals:   T-max:  Patient Vitals for the past 8 hrs:   BP Temp Temp src Pulse Resp SpO2   08/08/21 0915 119/77 -- -- 93 -- --   08/08/21 0745 126/85 -- -- -- -- --   08/08/21 0705 110/70 -- -- -- -- --   08/08/21 0659 (!) 78/46 -- -- -- -- --   08/08/21 0645 (!) 58/44 97.4 °F (36.3 °C) Oral 75 16 95 %   08/08/21 0328 104/64 97.4 °F (36.3 °C) Oral 94 16 96 %       Intake/Output Summary (Last 24 hours) at 8/8/2021 1023  Last data filed at 8/8/2021 6864  Gross per 24 hour   Intake 2830.29 ml   Output 520 ml   Net 2310.29 ml       Review of Systems  As per Interval hx    Physical Exam  Constitutional:       Comments: Right side facial droop   HENT:      Head:      Comments: Posterior incision is C/D/I; bloody drainage from area of removed drain  Cardiovascular:      Rate and Rhythm: Normal rate. Rhythm irregular. Pulmonary:      Breath sounds: Normal breath sounds. Abdominal:      Palpations: Abdomen is soft. Skin:     General: Skin is warm. Neurological:      Mental Status: He is alert and oriented to person, place, and time.    Psychiatric:         Mood and Affect: Mood normal.         LABS:    CBC:   Recent Labs     08/06/21  0504 08/07/21  0454 08/08/21  0619   WBC 7.0 11.5* 7.2   HGB 12.3* 16.2 11.7*   HCT 38.4* 50.1 36.3*    101* 180   MCV 85.3 84.1 84.5     Renal:    Recent Labs     08/06/21  0504 08/06/21  0952 08/06/21  1642 08/07/21  0454 08/08/21  0619     --  138 138 142   K 5.6*   < > 4.5 4.7 4.3     --  101 103 106   CO2 26  --  27 23 28   BUN 18  --  26* 31* 27*   CREATININE 1.0  --  1.2 1.2 1.1   GLUCOSE 180*  --  276* 130* 127*   CALCIUM 9.2  --  9.4 8.8 9.1   MG 1.90  --   --  2.10 1.80   PHOS  --   --  2.9  --   --    ANIONGAP 9  --  10 12 8    < > = values in this interval not displayed. Hepatic:   Recent Labs     08/06/21  1642   LABALBU 3.5     Troponin:   Recent Labs     08/08/21  0656   TROPONINI <0.01     BNP: No results for input(s): BNP in the last 72 hours. Lipids:   No results for input(s): CHOL, HDL in the last 72 hours. Invalid input(s): LDLCALCU, TRIGLYCERIDE  ABGs:  No results for input(s): PHART, MXN0NIG, PO2ART, NRP6VQX, BEART, THGBART, I9NAHJNK, YPV1UCL in the last 72 hours. INR:   Recent Labs     08/06/21  0504   INR 1.23*     Lactate: No results for input(s): LACTATE in the last 72 hours. Cultures:  -----------------------------------------------------------------  RAD:   CT Head wo Contrast   Final Result      1. Status post right subdural drain placement with decreased right cerebral convexity subdural hematoma and decreased 2 mm leftward midline shift. CT head without contrast   Final Result      Stable right subdural hematoma with leftward subfalcine herniation. Right-sided ventriculostomy catheter without hydrocephalus. Extensive sinus disease again noted.                 Assessment/Plan:   Patient is a 76 y/o M s/p craniotomy for subdural evacuation (right side), expose and replace programmable shunt valve (right side) 2/2 stable acute subdural hematoma along the right cerebral convexity

## 2021-08-08 NOTE — PROGRESS NOTES
Pt alert and oriented x3, VSS, IV ns locked in right arm. NIH score of 3. Pt has slurred speech and left side facial droop. Head drain to gravity with serosanguinous fluid. Incisions on right side of head are MARINO and are clean, dry and intact. Bed alarm on, bedside table and call light in reach.

## 2021-08-08 NOTE — PLAN OF CARE
Problem: Falls - Risk of:  Goal: Will remain free from falls  Description: Will remain free from falls  8/8/2021 0933 by Becky Wang RN  Outcome: Ongoing   All fall precautions in place. Bed locked and in lowest position with alarm on. Overbed table and personal belonings within reach. Call light within reach and patient instructed to use call light for assistance. Non-skid socks on. Problem: HEMODYNAMIC STATUS  Goal: Patient has stable vital signs and fluid balance  8/8/2021 0933 by Becky Wang RN  Outcome: Ongoing   Patient's VSS on room air at this time. Tolerating fluids well and voiding adequately. Problem: Pain:  Goal: Pain level will decrease  Description: Pain level will decrease  Outcome: Ongoing   Patient denies any pain or discomfort at this time. Will continue to monitor and assess pain.

## 2021-08-08 NOTE — PROGRESS NOTES
Pts bp @ 0645 was 58/44 manually. Pt alert and oriented denies dizziness. Notified Dr. Viral Kaplan. Started LR bolus per order. Rechecked bp, bp now 78/46. BP @ 0705 is 110/70. Dayshift nurse at bedside and aware of situation. Will continue to monitor.

## 2021-08-08 NOTE — PROGRESS NOTES
Speech Language Pathology  Facility/Department: [facility] 5T ORTHO/NEURO  Speech / Language / Cognitive Daily Treatment Note  NAME: Main Pak  : 1945  MRN: [id number]    Date of Eval: 2021  Evaluating Therapist: VINCENT Shah    Patient Diagnosis(es): has Left knee pain; Tibial plateau fracture, left; DM2 (diabetes mellitus, type 2) ([address] HTN (hypertension); High cholesterol; and Subdural hematoma (HCC) on their problem list.       Chart reviewed. Pain: None indicated    Medical diagnosis: SDH  Treatment diagnosis: cognitive linguistic impairment    Subjective: Pt in bed upon entry. Attended session alone. Pleasant and cooperative. Treatment:  Pt seen to address the following goals:  Goal 1: Pt will demonstrate <2 perseverations for a task with min cues. : Pt consistently exhibiting 3 perseverations for each tx task. Mod cues to extinguish. Poor insight into perseverative responses. Continue goal.    Goal 2: Pt will complete graded executive function tasks with adequate attention to detail and self-correction of errors with mod cues. : Functional money math reasoning - verbal problems: 63% accuracy with min cues improving to 88% accuracy with min-mod cues. Identifying items described - providing multiple answers: 90% accuracy with mod cues; limited by perseverations and cues required for increased cognitive flexibility. Continue goal.    Goal 3: Pt will complete graded recall tasks with 80% accuracy or min cues. : Dependent to recall ST and skills targeted. Recall of details from verbally presented information - paragraph level: 50% accuracy with min cues. Educated pt to use of visualization and required max fading to mod cues to implement strategy with auditory information. Continue goal.    Goal 4: Pt will participate in ongoing cognitive linguistic assessment. : Goal not targeted.  Continue goal.          Education: Educated pt to role of SLP, purpose of visit, impact SDH can have on cognition, deficits noted, skills targeted with tx, and recommendations. 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 Dysarthria is baseline per pt and family report. Plan:  Continue speech/language therapy to address above goals, 2-4 x/week x LOS  DC recommendations: ongoing ST indicated    D/W nursing, Colin, prior to session only  Needs met prior to leaving room, call button in reach. Treatment time: 25 min timed code tx       Manuel Flores MA CCC-SLP; [id number]  Speech-Language Pathologist  Pager # [phone]  Phone # [phone]  Office # [phone]    If patient is discharged prior to next session, this note will serve as discharge summary.

## 2021-08-09 ENCOUNTER — APPOINTMENT (OUTPATIENT)
Dept: CT IMAGING | Age: 76
DRG: 025 | End: 2021-08-09
Attending: INTERNAL MEDICINE
Payer: MEDICARE

## 2021-08-09 LAB
ANION GAP SERPL CALCULATED.3IONS-SCNC: 7 MMOL/L (ref 3–16)
BASOPHILS ABSOLUTE: 0 K/UL (ref 0–0.2)
BASOPHILS RELATIVE PERCENT: 0.2 %
BUN BLDV-MCNC: 28 MG/DL (ref 7–20)
CALCIUM SERPL-MCNC: 9.1 MG/DL (ref 8.3–10.6)
CHLORIDE BLD-SCNC: 108 MMOL/L (ref 99–110)
CO2: 28 MMOL/L (ref 21–32)
CREAT SERPL-MCNC: 1.2 MG/DL (ref 0.8–1.3)
EOSINOPHILS ABSOLUTE: 0.1 K/UL (ref 0–0.6)
EOSINOPHILS RELATIVE PERCENT: 1 %
ESTIMATED AVERAGE GLUCOSE: 151.3 MG/DL
GFR AFRICAN AMERICAN: >60
GFR NON-AFRICAN AMERICAN: 59
GLUCOSE BLD-MCNC: 110 MG/DL (ref 70–99)
GLUCOSE BLD-MCNC: 147 MG/DL (ref 70–99)
GLUCOSE BLD-MCNC: 164 MG/DL (ref 70–99)
GLUCOSE BLD-MCNC: 192 MG/DL (ref 70–99)
GLUCOSE BLD-MCNC: 225 MG/DL (ref 70–99)
HBA1C MFR BLD: 6.9 %
HCT VFR BLD CALC: 34.8 % (ref 40.5–52.5)
HEMOGLOBIN: 11.4 G/DL (ref 13.5–17.5)
LYMPHOCYTES ABSOLUTE: 1.4 K/UL (ref 1–5.1)
LYMPHOCYTES RELATIVE PERCENT: 23.8 %
MAGNESIUM: 1.8 MG/DL (ref 1.8–2.4)
MCH RBC QN AUTO: 27.5 PG (ref 26–34)
MCHC RBC AUTO-ENTMCNC: 32.8 G/DL (ref 31–36)
MCV RBC AUTO: 83.7 FL (ref 80–100)
MONOCYTES ABSOLUTE: 0.5 K/UL (ref 0–1.3)
MONOCYTES RELATIVE PERCENT: 9.4 %
NEUTROPHILS ABSOLUTE: 3.8 K/UL (ref 1.7–7.7)
NEUTROPHILS RELATIVE PERCENT: 65.6 %
PDW BLD-RTO: 14.8 % (ref 12.4–15.4)
PERFORMED ON: ABNORMAL
PLATELET # BLD: 156 K/UL (ref 135–450)
PMV BLD AUTO: 8.5 FL (ref 5–10.5)
POTASSIUM SERPL-SCNC: 4.4 MMOL/L (ref 3.5–5.1)
RBC # BLD: 4.15 M/UL (ref 4.2–5.9)
SODIUM BLD-SCNC: 143 MMOL/L (ref 136–145)
WBC # BLD: 5.8 K/UL (ref 4–11)

## 2021-08-09 PROCEDURE — 6370000000 HC RX 637 (ALT 250 FOR IP): Performed by: STUDENT IN AN ORGANIZED HEALTH CARE EDUCATION/TRAINING PROGRAM

## 2021-08-09 PROCEDURE — 80048 BASIC METABOLIC PNL TOTAL CA: CPT

## 2021-08-09 PROCEDURE — 83036 HEMOGLOBIN GLYCOSYLATED A1C: CPT

## 2021-08-09 PROCEDURE — 2060000000 HC ICU INTERMEDIATE R&B

## 2021-08-09 PROCEDURE — 6360000002 HC RX W HCPCS: Performed by: STUDENT IN AN ORGANIZED HEALTH CARE EDUCATION/TRAINING PROGRAM

## 2021-08-09 PROCEDURE — 97535 SELF CARE MNGMENT TRAINING: CPT

## 2021-08-09 PROCEDURE — 70450 CT HEAD/BRAIN W/O DYE: CPT

## 2021-08-09 PROCEDURE — 36415 COLL VENOUS BLD VENIPUNCTURE: CPT

## 2021-08-09 PROCEDURE — 97110 THERAPEUTIC EXERCISES: CPT

## 2021-08-09 PROCEDURE — 2580000003 HC RX 258: Performed by: STUDENT IN AN ORGANIZED HEALTH CARE EDUCATION/TRAINING PROGRAM

## 2021-08-09 PROCEDURE — 85025 COMPLETE CBC W/AUTO DIFF WBC: CPT

## 2021-08-09 PROCEDURE — 97530 THERAPEUTIC ACTIVITIES: CPT

## 2021-08-09 PROCEDURE — 83735 ASSAY OF MAGNESIUM: CPT

## 2021-08-09 RX ADMIN — TAMSULOSIN HYDROCHLORIDE 0.4 MG: 0.4 CAPSULE ORAL at 21:08

## 2021-08-09 RX ADMIN — DICYCLOMINE HYDROCHLORIDE 10 MG: 10 CAPSULE ORAL at 19:28

## 2021-08-09 RX ADMIN — DOCUSATE SODIUM 50 MG AND SENNOSIDES 8.6 MG 2 TABLET: 8.6; 5 TABLET, FILM COATED ORAL at 21:08

## 2021-08-09 RX ADMIN — INSULIN LISPRO 2 UNITS: 100 INJECTION, SOLUTION INTRAVENOUS; SUBCUTANEOUS at 08:51

## 2021-08-09 RX ADMIN — ACETAMINOPHEN 650 MG: 325 TABLET ORAL at 21:08

## 2021-08-09 RX ADMIN — DICYCLOMINE HYDROCHLORIDE 10 MG: 10 CAPSULE ORAL at 12:05

## 2021-08-09 RX ADMIN — HEPARIN SODIUM 5000 UNITS: 5000 INJECTION INTRAVENOUS; SUBCUTANEOUS at 05:44

## 2021-08-09 RX ADMIN — Medication 10 ML: at 08:55

## 2021-08-09 RX ADMIN — INSULIN LISPRO 2 UNITS: 100 INJECTION, SOLUTION INTRAVENOUS; SUBCUTANEOUS at 17:35

## 2021-08-09 RX ADMIN — LEVETIRACETAM 1000 MG: 500 TABLET ORAL at 08:54

## 2021-08-09 RX ADMIN — HEPARIN SODIUM 5000 UNITS: 5000 INJECTION INTRAVENOUS; SUBCUTANEOUS at 14:05

## 2021-08-09 RX ADMIN — PANTOPRAZOLE SODIUM 40 MG: 40 TABLET, DELAYED RELEASE ORAL at 05:44

## 2021-08-09 RX ADMIN — LEVETIRACETAM 1000 MG: 500 TABLET ORAL at 21:08

## 2021-08-09 RX ADMIN — PRAVASTATIN SODIUM 40 MG: 40 TABLET ORAL at 21:08

## 2021-08-09 RX ADMIN — DICYCLOMINE HYDROCHLORIDE 10 MG: 10 CAPSULE ORAL at 05:44

## 2021-08-09 RX ADMIN — INSULIN LISPRO 4 UNITS: 100 INJECTION, SOLUTION INTRAVENOUS; SUBCUTANEOUS at 21:09

## 2021-08-09 RX ADMIN — HEPARIN SODIUM 5000 UNITS: 5000 INJECTION INTRAVENOUS; SUBCUTANEOUS at 21:08

## 2021-08-09 RX ADMIN — DOCUSATE SODIUM 50 MG AND SENNOSIDES 8.6 MG 2 TABLET: 8.6; 5 TABLET, FILM COATED ORAL at 08:54

## 2021-08-09 RX ADMIN — INSULIN GLARGINE 14 UNITS: 100 INJECTION, SOLUTION SUBCUTANEOUS at 21:09

## 2021-08-09 RX ADMIN — POLYETHYLENE GLYCOL 3350 17 G: 17 POWDER, FOR SOLUTION ORAL at 08:54

## 2021-08-09 RX ADMIN — LISINOPRIL 10 MG: 10 TABLET ORAL at 08:54

## 2021-08-09 RX ADMIN — Medication 10 ML: at 21:11

## 2021-08-09 ASSESSMENT — PAIN DESCRIPTION - PAIN TYPE: TYPE: SURGICAL PAIN

## 2021-08-09 ASSESSMENT — PAIN DESCRIPTION - ONSET: ONSET: ON-GOING

## 2021-08-09 ASSESSMENT — PAIN SCALES - GENERAL
PAINLEVEL_OUTOF10: 0
PAINLEVEL_OUTOF10: 1

## 2021-08-09 ASSESSMENT — PAIN DESCRIPTION - ORIENTATION: ORIENTATION: RIGHT

## 2021-08-09 ASSESSMENT — PAIN DESCRIPTION - FREQUENCY: FREQUENCY: CONTINUOUS

## 2021-08-09 ASSESSMENT — PAIN DESCRIPTION - DESCRIPTORS: DESCRIPTORS: ACHING

## 2021-08-09 ASSESSMENT — PAIN DESCRIPTION - PROGRESSION: CLINICAL_PROGRESSION: NOT CHANGED

## 2021-08-09 ASSESSMENT — PAIN DESCRIPTION - LOCATION: LOCATION: HEAD

## 2021-08-09 NOTE — PROGRESS NOTES
NEUROSURGERY PROGRESS NOTE    8/9/2021 11:32 AM                               Matthew Wise                      LOS: 4 days   POD#4  s/p Procedure(s) (LRB):  TREPHINE CRANIOTOMY FOR SUBDURAL EVACUATION (Right)  EXPOSE AND REPLACE PROGRAMMABLE SHUNT VALVE (Right)    Subjective: Patient sitting up in bed upon entering the room. No acute events overnight. Patient has no specific complaints this morning. Physical Exam:  Patient seen and examined    Vitals:    08/09/21 1130   BP: (!) 147/90   Pulse: 74   Resp: 16   Temp: 98.4 °F (36.9 °C)   SpO2: 93%     GCS:  4 - Opens eyes on own  5 - Alert and oriented  6 - Follows simple motor commands  General: Well developed. Alert and cooperative in no acute distress.     HENT: atraumatic, neck supple  Eyes: Optic discs: Not tested  Pulmonary: unlabored respiratory effort  Cardiovascular:  Warm well perfused. No peripheral edema  Gastrointestinal: abdomen soft, NT, ND     Neurological:  Mental Status: Awake, alert, oriented x 4, speech dysarthic  Attention: Intact  Language: Dysarthria (chronic)  Sensation: Intact to all extremities to light touch  Coordination: Intact     Cranial Nerves:  II: Visual acuity not tested, denies new visual changes / diplopia  III, IV, VI: PERRL, 3 mm bilaterally, EOMI, no nystagmus noted  V: Facial sensation intact bilaterally to touch  VII: Left side facial weakness (chronic)  VIII: Hearing intact bilaterally to spoken voice  IX: Palate movement equal bilaterally  XI: Shoulder shrug equal bilaterally  XII: Tongue midline     Musculoskeletal:   Gait: Not tested   Assist devices: None   Tone: Normal  Motor strength:     Right  Left      Right  Left    Deltoid  5 5   Hip Flex  5 5   Biceps  5 5   Knee Extensors  5 5   Triceps  5 5   Knee Flexors  5 5   Wrist Ext  5 5   Ankle Dorsiflex. 5 5   Wrist Flex  5 5   Ankle Plantarflex.   5 5   Handgrip  5 5   Ext Derrick Longus  5 5   Thumb Ext  5 5              Incision: CDI    Drain: 145 mL in past 24 hours    Radiological Findings:  CT HEAD WO CONTRAST  Result Date: 8/4/2021  Large right subdural hematoma with mixed attenuation suggesting acute on chronic or active bleeding. There is 7 mm right to left midline shift. Remote left cerebellar infarct.     CT Head WO Contrast  Result Date: 8/4/2021  Stable exam. Acute subdural hematoma along the right cerebral convexity measuring up to 17 mm in maximal thickness, stable. Trace acute subdural hematoma along the falx cerebri and the bilateral tentorium, stable. 7 mm right to left midline shift, stable. Stable right parietal approach ventricular catheter. No hydrocephalus. Encephalomalacia in the left cerebellar hemisphere and left middle cerebellar peduncle/left wyatt, stable      CT head without contrast  Result Date: 8/5/2021  Stable right subdural hematoma with leftward subfalcine herniation. Right-sided ventriculostomy catheter without hydrocephalus. Extensive sinus disease again noted. CT Head wo Contrast  Result Date: 8/5/2021  Status post right subdural drain placement with decreased right cerebral convexity subdural hematoma and decreased 2 mm leftward midline shift. CT HEAD WO CONTRAST  Result Date: 8/9/2021  1. Significantly decreased size of the right subdural hematoma status post drainage. No new intracranial hemorrhage. 2. Stable advanced atrophy of the brainstem and large area of left cerebellar encephalomalacia    Labs:  Recent Labs     08/09/21  0619   WBC 5.8   HGB 11.4*   HCT 34.8*          Recent Labs     08/06/21  1642 08/07/21  0454 08/09/21  0619      < > 143   K 4.5   < > 4.4      < > 108   CO2 27   < > 28   BUN 26*   < > 28*   CREATININE 1.2   < > 1.2   GLUCOSE 276*   < > 164*   CALCIUM 9.4   < > 9.1   PHOS 2.9  --   --    MG  --    < > 1.80    < > = values in this interval not displayed. No results for input(s): PROTIME, INR, APTT in the last 72 hours.     Patient Active Problem List    Diagnosis Date Noted  Subdural hematoma ([facility] [address] 08/04/2021    Tibial plateau fracture, left 04/06/2015    DM2 (diabetes mellitus, type 2) ([facility] 04/06/2015    HTN (hypertension) 04/06/2015    High cholesterol 04/06/2015    Left knee pain 04/10/2014       Assessment:  Patient is a 77 y.o. male s/p Procedure(s) (LRB):  TREPHINE CRANIOTOMY FOR SUBDURAL EVACUATION (Right)  EXPOSE AND REPLACE PROGRAMMABLE SHUNT VALVE (Right) 2/2 stable acute subdural hematoma along the right cerebral convexity with brain compression and midline shift. .     Plan:  1. Neurologically stable  2. Neurologic exams frequency: Q4H  3. For change in exam MUST contact neurosurgery team along with critical care or primary team  4. SDH:  - s/p trephine crani for evacuation of SDH  - Repeat CT Head revealed decreased right cerebral convexity subdural hematoma  - Remove drain today  - Incisional Care: Open to air. Wash incision daily with warm soapy water or shower daily, and pat dry with clean dry towel. Paint with CHG swab. - Maintain SBP <160; If PRN med insufficient, then may start Nicardipine infusion  - Keep Plt >100k & INR <1.4  - Hold all full dose anticoagulation & antiplatelet for 2 weeks  5. Cerebral edema:  - Keep Na within normal limits  - Keep HOB >30 degrees  - If central venous access is needed please use subclavian vs femoral - No IJ as this can decrease venous return and worsen cerebral edema  6. GI Prophylaxis: Pepcid  7. Seizure prophylaxis: Keppra 1000 mg BID  8. Bowel Regimen: Senokot-S and Glycolax  9. Pain control: PRN Roxicodone  10. Mobility:  - Advance as tolerates  - PT/OT consulted, appreciate recs  11. Diet: Advance as tolerates  12. DVT Prophylaxis: SCD's & SQ Heparin  13. Appreciate critical care team assistance in management  14. Will follow inpatient. Please call with any questions or decline in neurological status    DISPO: OK to DC after drain removal from neurosurgery standpoint.  Dispo timing to be determined by primary team once patient is medically stable for discharge. Patient was discussed with Dr. Jeffrey Melara who agrees with above assessment and plan.      Electronically signed by: KRISTAL Anaya CNP, APRN-CNP, 8/9/2021 11:32 AM  [phone]

## 2021-08-09 NOTE — PROGRESS NOTES
Progress Note    Admit Date: 8/5/2021  Day: 2   Diet: ADULT DIET; Regular    CC on presentation: slurred speech, chronic facial droop, seizure    Interval history:   - no overnight events  - denies h/a, dizziness, lightheadedness; SOB, chest pain  - bloody drainage continues from RICHA drain  - No longer on O2    HPI:   Constance Alvarez is a 73-year-old male with a past medical history of meningiomas status post resection in 1983 in 19, type II DM, CKD, diastolic CHF hypertension and hyperlipidemia who presented to [facility] complaining of slurred speech, left-sided facial droop and seizure activity according to wife.  Pt had woken up in the afternoon with a headache , dull, 4/10, no radiation. He was seated in a chair watching a ball game when he started shaking his upper limbs which progressed to his lower limbs and this lasted approximately 1 minute and patient became unresponsive for about 10 mins.  Patient started becoming responsive after EMS arrived. Patient reports a history of mechanical falls over the past 5 days. Pt reports being on eliquis. Denies vision changes, nausea, vomiting, chest pain, palpitations, shortness of breath.   [facility] ED patient had a CT scan without contrast which revealed a large right subdural hematoma acute on chronic which is 1.5 cm in thickness with 7 mm right to left midline shift. Was given Kcentra and Keppra in the ED. patient was transferred to [facility] ICU for every hour neuro checks and further management. Repeat head CT w/o contrast showed a slightly larger subdural hematoma that was 1.7 cm in thickness and stable 7 mm right to L midline shift.       8/5: OR to drain SDH via neurosurgery; expose and replace programmable shunt valve    8/6: ok to transfer from ICU to [facility]    Medications:     Scheduled Meds:   insulin lispro  0-12 Units Subcutaneous 4x Daily AC & HS    levETIRAcetam  1,000 mg Oral BID    lidocaine 1 % injection  5 mL Intradermal Once    sodium chloride flush  5-40 mL Intravenous 2 times per day    sennosides-docusate sodium  2 tablet Oral BID    polyethylene glycol  17 g Oral Daily    lisinopril  10 mg Oral Daily    dicyclomine  10 mg Oral 4x Daily AC & HS    insulin glargine  14 Units Subcutaneous Nightly    pantoprazole  40 mg Oral QAM AC    pravastatin  40 mg Oral Nightly    tamsulosin  0.4 mg Oral Nightly    sodium chloride flush  5-40 mL Intravenous 2 times per day    heparin (porcine)  5,000 Units Subcutaneous 3 times per day     Continuous Infusions:   sodium chloride      dextrose      sodium chloride       PRN Meds:sodium chloride flush, sodium chloride, sodium chloride flush, acetaminophen, ondansetron **OR** ondansetron, labetalol, glucose, dextrose, glucagon (rDNA), dextrose, furosemide, sodium chloride, oxyCODONE, promethazine **OR** ondansetron, dextran 70-hypromellose    Objective:   Vitals:   T-max:  Patient Vitals for the past 8 hrs:   BP Temp Temp src Pulse Resp SpO2   08/09/21 1130 (!) 147/90 98.4 °F (36.9 °C) Oral 74 16 93 %   08/09/21 0715 126/77 97.9 °F (36.6 °C) Oral 89 16 92 %       Intake/Output Summary (Last 24 hours) at 8/9/2021 1149  Last data filed at 8/9/2021 0851  Gross per 24 hour   Intake 695 ml   Output 2625 ml   Net -1930 ml       Review of Systems  As per Interval hx    Physical Exam  Constitutional:       Comments: Right side facial droop   HENT:      Head:      Comments: Posterior incision is C/D/I; bloody drainage from area of removed drain  Cardiovascular:      Rate and Rhythm: Normal rate. Rhythm irregular. Pulmonary:      Breath sounds: Normal breath sounds. Abdominal:      Palpations: Abdomen is soft. Skin:     General: Skin is warm. Neurological:      Mental Status: He is alert and oriented to person, place, and time.    Psychiatric:         Mood and Affect: Mood normal.         LABS:    CBC:   Recent Labs     08/07/21  0454 08/08/21  0619 08/09/21  0619   WBC 11.5* 7.2 5.8   HGB 16.2 11.7* 11.4*   HCT 50.1 36.3* 34.8*   * 180 156   MCV 84.1 84.5 83.7     Renal:    Recent Labs     08/06/21  1642 08/06/21  1642 08/07/21  0454 08/08/21  0619 08/09/21  0619      < > 138 142 143   K 4.5   < > 4.7 4.3 4.4      < > 103 106 108   CO2 27   < > 23 28 28   BUN 26*   < > 31* 27* 28*   CREATININE 1.2   < > 1.2 1.1 1.2   GLUCOSE 276*   < > 130* 127* 164*   CALCIUM 9.4   < > 8.8 9.1 9.1   MG  --   --  2.10 1.80 1.80   PHOS 2.9  --   --   --   --    ANIONGAP 10   < > 12 8 7    < > = values in this interval not displayed. Hepatic:   Recent Labs     08/06/21 1642   LABALBU 3.5     Troponin:   Recent Labs     08/08/21  0656   TROPONINI <0.01     BNP: No results for input(s): BNP in the last 72 hours. Lipids:   No results for input(s): CHOL, HDL in the last 72 hours. Invalid input(s): LDLCALCU, TRIGLYCERIDE  ABGs:  No results for input(s): PHART, AME3CQA, PO2ART, XPH2QDY, BEART, THGBART, W9BDCZIX, CWA1BRR in the last 72 hours. INR:   No results for input(s): INR in the last 72 hours. Lactate: No results for input(s): LACTATE in the last 72 hours. Cultures:  -----------------------------------------------------------------  RAD:   CT HEAD WO CONTRAST   Final Result      1. Significantly decreased size of the right subdural hematoma status post drainage. No new intracranial hemorrhage. 2. Stable advanced atrophy of the brainstem and large area of left cerebellar encephalomalacia      CT Head wo Contrast   Final Result      1. Status post right subdural drain placement with decreased right cerebral convexity subdural hematoma and decreased 2 mm leftward midline shift. CT head without contrast   Final Result      Stable right subdural hematoma with leftward subfalcine herniation. Right-sided ventriculostomy catheter without hydrocephalus. Extensive sinus disease again noted.                 Assessment/Plan:   Patient is a 78 y/o M s/p craniotomy for subdural evacuation (right side), expose and replace programmable shunt valve (right side) 2/2 stable acute subdural hematoma along the right cerebral convexity with shift. 1. S/P craniotomy for subdural evacuation 2/2 stable right-sided SDH  - neurosurgery on board; the following are their recommendations  - onofre exam q4 hours  - if change in exam or new deficit - call neurosurg and repeat CTH    SDH  - cefazolin x 6 doses post-op, or while drain in place  - Continue drain. Drain open to gravity. Do NOT milk drain  - Incisional Care: Open to air. Wash incision daily with warm soapy water or shower daily, and pat dry with clean dry towel. Paint with CHG swab. - Maintain SBP <160; If PRN med insufficient, then may start Nicardipine infusion  - Keep Plt >100k & INR <1.4  - Hold all full dose anticoagulation & antiplatelet for 2 weeks  - Repeat head CT 8/9/21: Significantly decreased size of the right subdural hematoma status post drainage. No new intracranial hemorrhage; Stable advanced atrophy of the brainstem and large area of left cerebellar encephalomalacia    Cerebral edema:  - Keep Na within normal limits  - Keep HOB >30 degrees  - If central venous access is needed please use subclavian vs femoral - No IJ as this can decrease venous return and worsen cerebral edema    GI Prophylaxis: Pepcid  Seizure prophylaxis: Keppra 1000 mg BID  Bowel Regimen: Senokot-S and Glycolax  Pain control: PRN Roxicodone  Mobility:  - Advance as tolerates  - PT/OT consulted, appreciate recs  Diet: Advance as tolerates  DVT Prophylaxis: SCD's & SQ Heparin    2. Hyperkalemia  - K today: 4.4    3. HTN  - Keep SBP < 160 per neuro team  - lisinopril restarted  - labetalol 10 mg PRN   - 8/8: had hypotension (50/40 on manual check); given 1L IVF; pt BP is ok now  - BP: /62-90    4. T2DM  - insulin glargine (Lantus)   - insulin lispro   - POC glucose: 147    5.  Atrial Fibrillation   - hold Eliquis for at least 2 weeks d/t SDH    PT/OT  Recs:

## 2021-08-09 NOTE — CARE COORDINATION
Case Management Daily Note                    Date: 8/9/2021     Patient Name: Génesis Alarcon    Date of Admission: 8/5/2021 12:39 AM  YOB: 1945    Length of Stay: 4  GMLOS: 4.4         Patient Admission Status: Inpatient  Diagnosis:Subdural hematoma ([address] [K06.9S3Z]     ________________________________________________________________________________________  Discharge Plan: SNF: [facility] (Reviewing will know tomorrow)  [facility] referral.   Start looking at back up options     Insurance: Payor: Bassem Clark / Plan: Mario Mcdonald ESSENTIAL/PLUS / Product Type: *No Product type* /   Is pre-cert/notification needed:     Tentative discharge date: 8/11    Current barriers: Placement, cert     Referrals completed: SNF: [facility]     Resources/ information provided: SNF List   ________________________________________________________________________________________  PT AM-PAC: 12 / 24 per last evaluation on: 8/9    OT AM-PAC: 15 / 24 per last evaluation on: 8/9    DME Needs for discharge: defer  ________________________________________________________________________________________  Notes/Plan of Care:   SW placed call to Demetrice at [facility] ([phone]) she reported no beds today or tomorrow, unsure about Wednesday. SW to talk with Demetrice tomorrow in AM if no space available. Will need no placement. SW to follow up with patient and family regarding possible alternative options. UPDATE: 4:30 pm   Met with patient spouse and step-daughter at bedside. SW provided update. They were agreeable to referral to [address] [place], [place] [address]Lisa Brian Paez and/or his family were provided with choice of provider; he and/or his family are in agreement with the discharge plan at this time.     Care Transition Patient: TAVON Allen  [facility]   Case Management Department  Ph: [phone]

## 2021-08-09 NOTE — PROGRESS NOTES
Pt is A&O, VSS. Tolerating diet well. Can be incont at times, but voiding adequately per urinal or bathroom. Up x2 with gb and walker vs stedy. Denying pain at this time, pain medication available if needed. Surgical sites are CD&I. NIH of 3, neuro checks remain unchanged. All fall precautions in place. Call light within reach.

## 2021-08-09 NOTE — PROGRESS NOTES
Occupational Therapy  Facility/Department: [facility] 5T ORTHO/NEURO  Daily Treatment Note  NAME: Thuy Howe  : 1945  MRN: [id number]    Date of Service: 2021    Discharge Recommendations: Thuy Howe scored a 15/24 on the AM-PAC ADL Inpatient form. Current research shows that an AM-PAC score of 17 or less is typically not associated with a discharge to the patient's home setting. Based on the patient's AM-PAC score and their current ADL deficits, it is recommended that the patient have 3-5 sessions per week of Occupational Therapy at d/c to increase the patient's independence. Please see assessment section for further patient specific details. If patient discharges prior to next session this note will serve as a discharge summary. Please see below for the latest assessment towards goals. OT Equipment Recommendations  Other: defer    Assessment   Performance deficits / Impairments: Decreased functional mobility ; Decreased ADL status; Decreased endurance;Decreased balance;Decreased fine motor control  Assessment: Pt cont to function below baseline s/p crani. Pt cont to demo impulsiveness, impaired balance, general weakness, LUE weakness. Pt requiring [place] with short mobility and transfers, [place] static standing, assist required for ADLs. Pt completing UE/LE exercise this date. Pt would benefit from cont skilled OT while in acute care and at [facility]. Cont POC. Treatment Diagnosis: impaired ADLs/transfers   OT Education: OT Role;Plan of Care;ADL Adaptive Strategies;Precautions;Transfer Training  Patient Education: continued education and reinforcement  Barriers to Learning: cognition/impulse  REQUIRES OT FOLLOW UP: Yes  Activity Tolerance  Activity Tolerance: Patient Tolerated treatment well  Safety Devices  Safety Devices in place: Yes  Type of devices: Nurse notified; Left in chair;Chair alarm in place;Call light within reach; Patient at risk for falls; All fall risk precautions in place;Gait belt Patient Diagnosis(es): There were no encounter diagnoses. has a past medical history of Arthritis, Brain tumor ([facility] [address] Cancer ([facility] [address] Deaf, Diabetes ([facility] [address] Diabetes mellitus ([facility] [address] Endocarditis, High blood pressure, and Peptic ulcer. has a past surgical history that includes tumor excision; Cardiac surgery; Appendectomy (age 4); Tonsillectomy (age 10); Upper gastrointestinal endoscopy (2013); craniotomy (Right, 8/5/2021); and craniotomy (Right, 8/5/2021). Restrictions  Position Activity Restriction  Other position/activity restrictions: Progressive Amb, HOB elevated 30 degrees  Subjective   General  Chart Reviewed: Yes  Additional Pertinent Hx: 77 y.o. M to ED w/ c/o slurred speech and left side facial droop. Hospital Course: Neurosurgery was consulted and recommended transfer to [facility] for medical management and observation;  CT Head: Large right subdural hematoma with mixed attenuation suggesting acute onchronic or active bleeding; Repeat Head CT: Stable right subdural hematoma with leftward subfalcine herniation; OR 8/5 for TREPHINE CRANIOTOMY FOR SUBDURAL EVACUATION (Right Head) EXPOSE AND REPLACE PROGRAMMABLE SHUNT VALVE (Right Head). PMH: brain tumor, diabetes, DVT, hypertension  Family / Caregiver Present: No  Referring Practitioner: NITA Marti  Diagnosis: Subdural Hematoma  Subjective  Subjective: Pt in bed upon arrival, agreeable to session, reporting no pain      Orientation  Orientation  Overall Orientation Status: Within Functional Limits  Objective    ADL  Grooming: Minimal assistance;Verbal cueing; Increased time to complete (wash face and hands with wash cloth)  UE Bathing: Minimal assistance;Verbal cueing; Increased time to complete  LE Bathing: Minimal assistance;Verbal cueing; Increased time to complete  UE Dressing: Minimal assistance;Verbal cueing; Increased time to complete  Toileting: Minimal assistance (in stance EOB with urinal)        Balance  Sitting Balance: Stand by assistance  Standing Balance: Minimal assistance (to CGA)  Standing Balance  Time: ~3-5 min  Activity: static stance to use urinal 2-3 min, bed to chair transfer  Functional Mobility  Functional - Mobility Device: Rolling Walker  Activity: Other  Assist Level: Minimal assistance  Functional Mobility Comments: shuffled steps, cues for RW proximity to body and hand placement with transfers, verbal cues, increased time  Bed mobility  Supine to Sit: Stand by assistance  Comment: increased time and effort, HOB raised, use of rail, left in chair end of session- reclined, tray table in front, call light in reach  Transfers  Sit to stand: Minimal assistance  Stand to sit: Minimal assistance                       Cognition  Overall Cognitive Status: Exceptions  Arousal/Alertness: Delayed responses to stimuli; Appropriate responses to stimuli  Following Commands: Follows one step commands with repetition; Follows one step commands with increased time  Attention Span: Attends with cues to redirect  Safety Judgement: Decreased awareness of need for assistance;Decreased awareness of need for safety  Problem Solving: Decreased awareness of errors  Insights: Decreased awareness of deficits  Initiation: Requires cues for some  Cognition Comment: impulsive at times, increased cues, increased time                    Type of ROM/Therapeutic Exercise  Type of ROM/Therapeutic Exercise: AROM  Exercises  Scapular Retraction: x10 BUE  Shoulder Elevation: x10 BUE  Shoulder Flexion: x10 BUE  Shoulder Extension: x10 BUE  Shoulder ABduction: x10 BUE  Shoulder ADduction: x10 BUE  Elbow Flexion: x10 BUE  Elbow Extension: x10 BUE  Wrist Flexion: x10 BUE  Wrist Extension: x10 BUE  Finger Flexion: x10 BUE  Finger Extension: x10 BUE  Other: LE exercise- hip flex/ext, knee flex/ext, plantar/dorsiflexion ankle x10 BLE                    Plan   Plan  Times per week: 5-7  Times per day: Daily  Current Treatment Recommendations: Balance Training, Functional Mobility Training, Endurance Training, Safety Education & Training, Neuromuscular Re-education, Equipment Evaluation, Education, & procurement, Self-Care / ADL    AM-PAC Score        AM-PAC Inpatient Daily Activity Raw Score: 15 (08/09/21 1516)  AM-PAC Inpatient ADL T-Scale Score : 34.69 (08/09/21 1516)  ADL Inpatient CMS 0-100% Score: 56.46 (08/09/21 1516)  ADL Inpatient CMS G-Code Modifier : CK (08/09/21 1516)    Goals  Short term goals  Time Frame for Short term goals: Discharge  Short term goal 1: toilet transfer w/ CGA- 8/9 cont  Short term goal 2: LB dressing w/ CGA- 8/9 cont  Short term goal 3: grooming activity in standing, CGA- 8/9 cont  Short term goal 4: bed mobility w/ CGA- 8/8 met, NEW GOAL bed mobility with SPVN  Patient Goals   Patient goals : no goal stated       Therapy Time   Individual Concurrent Group Co-treatment   Time In 1420         Time Out 1458         Minutes 38              Timed Code Treatment Minutes:   38    Total Treatment Minutes:  [address], MOT, OTR/L

## 2021-08-09 NOTE — PLAN OF CARE
Problem: Skin Integrity:  Goal: Will show no infection signs and symptoms  Description: Will show no infection signs and symptoms  Outcome: Ongoing  Note: No new skin breakdown. Incision to the head and drain site CDI. Problem: Falls - Risk of:  Goal: Will remain free from falls  Description: Will remain free from falls  8/8/2021 2112 by Jordon Sandoval RN  Outcome: Ongoing  Note: Pt will remain free of falls for the duration of the shift. Pt is A/O x4  and uses the call light appropriately for needs. Pt is assist x2 with RW and GB vs ashley deng. Bed alarm on, wheels locked, bed in lowest position, side rails up 2/4, nonskid socks on, call light and bedside table in reach. Will continue to monitor. Problem: HEMODYNAMIC STATUS  Goal: Patient has stable vital signs and fluid balance  8/8/2021 2112 by Jordon Sandoval RN  Outcome: Ongoing  Note: BP stable. PO intake and output adequate. Problem: Pain:  Goal: Pain level will decrease  Description: Pain level will decrease  8/8/2021 2112 by Jordon Sandoval RN  Outcome: Ongoing  Note: Pt takes PO pain meds for head/incisional pain.

## 2021-08-09 NOTE — PLAN OF CARE
Problem: Falls - Risk of:  Goal: Will remain free from falls  Description: Will remain free from falls  8/9/2021 0823 by Liddie Record, RN  Outcome: Ongoing  Fall precautions in place. Bed is in lowest position, wheels locked, bed alarm on, non skid socks on. Call light and bedside table within reach. Pt calls out appropriately. Pt is up x2 with gb and walker vs stedy. Will continue to assess and monitor. Problem: HEMODYNAMIC STATUS  Goal: Patient has stable vital signs and fluid balance  8/9/2021 0823 by Liddie Record, RN  Outcome: Ongoing  VSS. Tolerating PO fluids. Voiding adequately. Problem: Pain:  Goal: Pain level will decrease  Description: Pain level will decrease  8/9/2021 0823 by Liddie Record, RN  Outcome: Ongoing  Pt c/o minimal pain in his head at this time. Ice in place and pain medication available if needed. Will continue to monitor.

## 2021-08-09 NOTE — PROGRESS NOTES
Subdural drain removed with sterile technique. Patient tolerated well. No complications.     Electronically signed by: KRISTAL Cox CNP, KRISTAL-CNP, 8/9/2021 11:53 AM  [phone]

## 2021-08-10 PROBLEM — G51.0 FACIAL PARALYSIS ON LEFT SIDE: Status: ACTIVE | Noted: 2021-08-10

## 2021-08-10 PROBLEM — I48.91 ATRIAL FIBRILLATION (HCC): Status: ACTIVE | Noted: 2021-08-10

## 2021-08-10 LAB
ANION GAP SERPL CALCULATED.3IONS-SCNC: 8 MMOL/L (ref 3–16)
BASOPHILS ABSOLUTE: 0 K/UL (ref 0–0.2)
BASOPHILS RELATIVE PERCENT: 0.6 %
BUN BLDV-MCNC: 21 MG/DL (ref 7–20)
CALCIUM SERPL-MCNC: 9.3 MG/DL (ref 8.3–10.6)
CHLORIDE BLD-SCNC: 107 MMOL/L (ref 99–110)
CO2: 28 MMOL/L (ref 21–32)
CREAT SERPL-MCNC: 1 MG/DL (ref 0.8–1.3)
EOSINOPHILS ABSOLUTE: 0.1 K/UL (ref 0–0.6)
EOSINOPHILS RELATIVE PERCENT: 1.1 %
ESTIMATED AVERAGE GLUCOSE: 154.2 MG/DL
ESTIMATED AVERAGE GLUCOSE: 157.1 MG/DL
GFR AFRICAN AMERICAN: >60
GFR NON-AFRICAN AMERICAN: >60
GLUCOSE BLD-MCNC: 120 MG/DL (ref 70–99)
GLUCOSE BLD-MCNC: 132 MG/DL (ref 70–99)
GLUCOSE BLD-MCNC: 139 MG/DL (ref 70–99)
GLUCOSE BLD-MCNC: 147 MG/DL (ref 70–99)
GLUCOSE BLD-MCNC: 186 MG/DL (ref 70–99)
HBA1C MFR BLD: 7 %
HBA1C MFR BLD: 7.1 %
HCT VFR BLD CALC: 35.1 % (ref 40.5–52.5)
HEMOGLOBIN: 11.5 G/DL (ref 13.5–17.5)
LYMPHOCYTES ABSOLUTE: 0.9 K/UL (ref 1–5.1)
LYMPHOCYTES RELATIVE PERCENT: 17.6 %
MAGNESIUM: 1.9 MG/DL (ref 1.8–2.4)
MCH RBC QN AUTO: 27.4 PG (ref 26–34)
MCHC RBC AUTO-ENTMCNC: 32.8 G/DL (ref 31–36)
MCV RBC AUTO: 83.4 FL (ref 80–100)
MONOCYTES ABSOLUTE: 0.5 K/UL (ref 0–1.3)
MONOCYTES RELATIVE PERCENT: 8.6 %
NEUTROPHILS ABSOLUTE: 3.9 K/UL (ref 1.7–7.7)
NEUTROPHILS RELATIVE PERCENT: 72.1 %
PDW BLD-RTO: 14.5 % (ref 12.4–15.4)
PERFORMED ON: ABNORMAL
PLATELET # BLD: 150 K/UL (ref 135–450)
PMV BLD AUTO: 8.9 FL (ref 5–10.5)
POTASSIUM SERPL-SCNC: 4.1 MMOL/L (ref 3.5–5.1)
RBC # BLD: 4.21 M/UL (ref 4.2–5.9)
SODIUM BLD-SCNC: 143 MMOL/L (ref 136–145)
WBC # BLD: 5.3 K/UL (ref 4–11)

## 2021-08-10 PROCEDURE — 6370000000 HC RX 637 (ALT 250 FOR IP): Performed by: STUDENT IN AN ORGANIZED HEALTH CARE EDUCATION/TRAINING PROGRAM

## 2021-08-10 PROCEDURE — 2580000003 HC RX 258: Performed by: STUDENT IN AN ORGANIZED HEALTH CARE EDUCATION/TRAINING PROGRAM

## 2021-08-10 PROCEDURE — 97116 GAIT TRAINING THERAPY: CPT

## 2021-08-10 PROCEDURE — 83036 HEMOGLOBIN GLYCOSYLATED A1C: CPT

## 2021-08-10 PROCEDURE — 97110 THERAPEUTIC EXERCISES: CPT

## 2021-08-10 PROCEDURE — 80048 BASIC METABOLIC PNL TOTAL CA: CPT

## 2021-08-10 PROCEDURE — 92526 ORAL FUNCTION THERAPY: CPT

## 2021-08-10 PROCEDURE — 83735 ASSAY OF MAGNESIUM: CPT

## 2021-08-10 PROCEDURE — 85025 COMPLETE CBC W/AUTO DIFF WBC: CPT

## 2021-08-10 PROCEDURE — 36415 COLL VENOUS BLD VENIPUNCTURE: CPT

## 2021-08-10 PROCEDURE — 6360000002 HC RX W HCPCS: Performed by: STUDENT IN AN ORGANIZED HEALTH CARE EDUCATION/TRAINING PROGRAM

## 2021-08-10 PROCEDURE — 2060000000 HC ICU INTERMEDIATE R&B

## 2021-08-10 PROCEDURE — 97129 THER IVNTJ 1ST 15 MIN: CPT

## 2021-08-10 PROCEDURE — 97535 SELF CARE MNGMENT TRAINING: CPT

## 2021-08-10 RX ORDER — LEVETIRACETAM 1000 MG/1
1000 TABLET ORAL 2 TIMES DAILY
Qty: 60 TABLET | Refills: 3 | Status: SHIPPED | OUTPATIENT
Start: 2021-08-10 | End: 2022-01-24 | Stop reason: ALTCHOICE

## 2021-08-10 RX ORDER — SENNA AND DOCUSATE SODIUM 50; 8.6 MG/1; MG/1
2 TABLET, FILM COATED ORAL 2 TIMES DAILY
Qty: 28 TABLET | Refills: 0 | Status: SHIPPED | OUTPATIENT
Start: 2021-08-10 | End: 2021-08-17

## 2021-08-10 RX ADMIN — PRAVASTATIN SODIUM 40 MG: 40 TABLET ORAL at 21:06

## 2021-08-10 RX ADMIN — TAMSULOSIN HYDROCHLORIDE 0.4 MG: 0.4 CAPSULE ORAL at 21:06

## 2021-08-10 RX ADMIN — HEPARIN SODIUM 5000 UNITS: 5000 INJECTION INTRAVENOUS; SUBCUTANEOUS at 14:41

## 2021-08-10 RX ADMIN — INSULIN GLARGINE 14 UNITS: 100 INJECTION, SOLUTION SUBCUTANEOUS at 21:10

## 2021-08-10 RX ADMIN — DICYCLOMINE HYDROCHLORIDE 10 MG: 10 CAPSULE ORAL at 09:54

## 2021-08-10 RX ADMIN — PANTOPRAZOLE SODIUM 40 MG: 40 TABLET, DELAYED RELEASE ORAL at 05:16

## 2021-08-10 RX ADMIN — DICYCLOMINE HYDROCHLORIDE 10 MG: 10 CAPSULE ORAL at 05:16

## 2021-08-10 RX ADMIN — HEPARIN SODIUM 5000 UNITS: 5000 INJECTION INTRAVENOUS; SUBCUTANEOUS at 05:16

## 2021-08-10 RX ADMIN — LEVETIRACETAM 1000 MG: 500 TABLET ORAL at 21:06

## 2021-08-10 RX ADMIN — DICYCLOMINE HYDROCHLORIDE 10 MG: 10 CAPSULE ORAL at 17:57

## 2021-08-10 RX ADMIN — Medication 10 ML: at 09:55

## 2021-08-10 RX ADMIN — LEVETIRACETAM 1000 MG: 500 TABLET ORAL at 09:53

## 2021-08-10 RX ADMIN — LISINOPRIL 10 MG: 10 TABLET ORAL at 09:54

## 2021-08-10 RX ADMIN — Medication 10 ML: at 09:53

## 2021-08-10 RX ADMIN — DICYCLOMINE HYDROCHLORIDE 10 MG: 10 CAPSULE ORAL at 21:06

## 2021-08-10 RX ADMIN — HEPARIN SODIUM 5000 UNITS: 5000 INJECTION INTRAVENOUS; SUBCUTANEOUS at 21:06

## 2021-08-10 RX ADMIN — DOCUSATE SODIUM 50 MG AND SENNOSIDES 8.6 MG 2 TABLET: 8.6; 5 TABLET, FILM COATED ORAL at 09:53

## 2021-08-10 RX ADMIN — Medication 20 ML: at 21:07

## 2021-08-10 RX ADMIN — INSULIN LISPRO 2 UNITS: 100 INJECTION, SOLUTION INTRAVENOUS; SUBCUTANEOUS at 21:09

## 2021-08-10 RX ADMIN — POLYETHYLENE GLYCOL 3350 17 G: 17 POWDER, FOR SOLUTION ORAL at 09:53

## 2021-08-10 ASSESSMENT — PAIN SCALES - GENERAL
PAINLEVEL_OUTOF10: 0
PAINLEVEL_OUTOF10: 0

## 2021-08-10 NOTE — PROGRESS NOTES
Pt is A&O, VSS. Tolerating diet well. Voiding adequately per bathroom or urinal. Up x1 with gb and walker. Denies pain at this time. Surgical site is CD&I with minimal drainage. Neuro checks remain unchanged. All fall precautions in place. Call light within reach.

## 2021-08-10 NOTE — PLAN OF CARE
Problem: Falls - Risk of:  Goal: Will remain free from falls  Description: Will remain free from falls  8/10/2021 0118 by Parveen Nesbitt RN  Outcome: Ongoing  Patient remains free from falls during this shift. Bed is in the lowest position and the bed alarm is activated. Anti-slip socks are on. Call light is within reach. Will continue to monitor and reassess. Problem: HEMODYNAMIC STATUS  Goal: Patient has stable vital signs and fluid balance  8/10/2021 0118 by Parveen Nesbitt RN  Outcome: Ongoing  Vital signs are stable. Patient tolerating PO fluids. Patient voiding adequate amount of urine.  Will continue to monitor and reassess

## 2021-08-10 NOTE — PROGRESS NOTES
Occupational Therapy  Facility/Department: [facility] 5T ORTHO/NEURO  Daily Treatment Note  NAME: Roque Porras  : 1945  MRN: [id number]    Date of Service: 8/10/2021    Discharge Recommendations:Fred Deluna scored a 17/24 on the AM-PAC ADL Inpatient form. Current research shows that an AM-PAC score of 17 or less is typically not associated with a discharge to the patient's home setting. Based on the patient's AM-PAC score and their current ADL deficits, it is recommended that the patient have 3-5 sessions per week of Occupational Therapy at d/c to increase the patient's independence. Please see assessment section for further patient specific details. If patient discharges prior to next session this note will serve as a discharge summary. Please see below for the latest assessment towards goals. OT Equipment Recommendations  Other: defer    Assessment   Performance deficits / Impairments: Decreased functional mobility ; Decreased ADL status; Decreased endurance;Decreased balance;Decreased fine motor control  Assessment: Pt continues to demonstrate impulsiveness and requires CGA for functional mobility/transfers and dynamic standing balance progressing to Min A. Pt would benefit from inpt skilled therapy at d/c to maximize functional independence and safety. Continue with POC. Treatment Diagnosis: impaired ADLs/transfers s/p TREPHINE CRANIOTOMY FOR SUBDURAL EVACUATION (Right Head) EXPOSE AND REPLACE PROGRAMMABLE SHUNT VALVE (Right Head). Prognosis: Good  OT Education: OT Role;Plan of Care;ADL Adaptive Strategies;Transfer Training  Patient Education: continued education and reinforcement  Barriers to Learning: cognition/impulse  REQUIRES OT FOLLOW UP: Yes  Activity Tolerance  Activity Tolerance: Patient Tolerated treatment well  Safety Devices  Safety Devices in place: Yes  Type of devices: Nurse notified; Left in chair;Chair alarm in place;Call light within reach; Patient at risk for falls; All fall risk precautions in place;Gait belt         Patient Diagnosis(es): There were no encounter diagnoses. has a past medical history of Arthritis, Brain tumor ([facility] [address] Cancer ([facility] [address] Deaf, Diabetes ([facility] [address] Diabetes mellitus ([facility] [address] Endocarditis, High blood pressure, and Peptic ulcer. has a past surgical history that includes tumor excision; Cardiac surgery; Appendectomy (age 4); Tonsillectomy (age 10); Upper gastrointestinal endoscopy (2013); craniotomy (Right, 8/5/2021); and craniotomy (Right, 8/5/2021). Restrictions  Position Activity Restriction  Other position/activity restrictions: Progressive Amb, HOB elevated 30 degrees  Subjective   General  Chart Reviewed: Yes  Additional Pertinent Hx: 77 y.o. M to ED w/ c/o slurred speech and left side facial droop. Hospital Course: Neurosurgery was consulted and recommended transfer to [facility] for medical management and observation;  CT Head: Large right subdural hematoma with mixed attenuation suggesting acute onchronic or active bleeding; Repeat Head CT: Stable right subdural hematoma with leftward subfalcine herniation; OR 8/5 for TREPHINE CRANIOTOMY FOR SUBDURAL EVACUATION (Right Head) EXPOSE AND REPLACE PROGRAMMABLE SHUNT VALVE (Right Head). PMH: brain tumor, diabetes, DVT, hypertension  Response to previous treatment: Patient with no complaints from previous session  Family / Caregiver Present: Yes (sister Ottoniel Napoles)  Referring Practitioner: NITA Ramos  Diagnosis: Subdural Hematoma  Subjective  Subjective: Pt supine in bed upon entry, pleasant and agreeable to therapy session. General Comment  Comments: Pt supine to sit SBA. Pt sit EOB SBA. Pt sit to stand CGA, pt ambualted ~18 ft with rw at CGA to toilet. Pt in stance CGA at toilet to void. Pt doffed brief. Pt ambulated with rw several feet to sink in bathroom. Pt in stance at sink CGA progressing to Min A ~8 min total for oral care/wash face/wash joaquim area/buttocks.  Pt seated at chair at sink washed UB/LB with Supervision. Pt threaded brief/hospital pants seated on chair and CGA in stance to pull up. Pt donned  socks with setup/Supervision figure 4. Pt ambulated to chair ~18 ft with CGA and rw. Pt stand to sit CGA, call light in reach and chair alarm on. Vital Signs  Patient Currently in Pain: Denies   Orientation  Orientation  Overall Orientation Status: Within Functional Limits  Objective    ADL  Grooming: Contact guard assistance; Increased time to complete  UE Bathing: Supervision;Setup;Verbal cueing  LE Bathing: Contact guard assistance;Setup; Increased time to complete;Verbal cueing  LE Dressing: Contact guard assistance;Setup;Verbal cueing  Toileting: Contact guard assistance (in stance to void CGA)        Balance  Sitting Balance: Stand by assistance  Standing Balance: Contact guard assistance (progressing to Min A for balance)  Standing Balance  Time: ~10 min  Activity: to/from bathroom, in stance at sink ADLs, LB clothing mgmt  Functional Mobility  Functional - Mobility Device: Rolling Walker  Activity: To/from bathroom  Assist Level: Contact guard assistance  Bed mobility  Supine to Sit: Stand by assistance  Transfers  Sit to stand: Contact guard assistance  Stand to sit: Contact guard assistance                       Cognition  Overall Cognitive Status: Exceptions  Arousal/Alertness: Delayed responses to stimuli; Appropriate responses to stimuli  Following Commands: Follows one step commands with repetition; Follows one step commands with increased time  Attention Span: Attends with cues to redirect  Safety Judgement: Decreased awareness of need for assistance;Decreased awareness of need for safety  Insights: Decreased awareness of deficits  Initiation: Requires cues for some  Sequencing: Requires cues for some  Cognition Comment: impulsive at times, increased cues, increased time                                         Plan   Plan  Times per week: 5-7  Times per day: Daily  Current Treatment Recommendations: Balance Training, Functional Mobility Training, Endurance Training, Safety Education & Training, Neuromuscular Re-education, Equipment Evaluation, Education, & procurement, Self-Care / ADL  G-Code     OutComes Score                                                  AM-PAC Score        AM-PAC Inpatient Daily Activity Raw Score: 17 (08/10/21 1516)  AM-PAC Inpatient ADL T-Scale Score : 37.26 (08/10/21 1516)  ADL Inpatient CMS 0-100% Score: 50.11 (08/10/21 1516)  ADL Inpatient CMS G-Code Modifier : CK (08/10/21 1516)    Goals  Short term goals  Time Frame for Short term goals: Discharge  Short term goal 1: toilet transfer w/ CGA- 8/9 cont, goal not addressed 8/10  Short term goal 2: LB dressing w/ CGA- 8/9, goal met 8/10  Short term goal 3: grooming activity in standing, CGA- 8/9 cont, CGA 8/10  Short term goal 4: bed mobility w/ CGA- 8/8 met, NEW GOAL bed mobility with SPVN - goal not met SBA 8/10  Patient Goals   Patient goals : no goal stated       Therapy Time   Individual Concurrent Group Co-treatment   Time In 5404         Time Out 1326         Minutes 41         Timed Code Treatment Minutes: [address], [address]

## 2021-08-10 NOTE — DISCHARGE INSTR - COC
Continuity of Care Form    Patient Name: Ramses Guaman   :  1945  MRN:  [id number]    Admit date:  2021  Discharge date:  2021    Code Status Order: Full Code   Advance Directives:      Admitting Physician:  Desiree Mckenzie DO  PCP: Zi Clifton MD    Discharging Nurse: [facility] Unit/Room#: 2805/7743-88  Discharging Unit Phone Number: ***    Emergency Contact:   Extended Emergency Contact Information  Primary Emergency Contact: Elyse Dorman  Address: [address], [address] [facility]  Home Phone: [phone]  Relation: Spouse  Secondary Emergency Contact: Tirso Gallo  Mobile Phone: [phone]  Relation: Step Child   needed? No    Past Surgical History:  Past Surgical History:   Procedure Laterality Date    APPENDECTOMY  age 3    CARDIAC SURGERY      repair heart valve surgery at [address] Right 2021    TREPHINE CRANIOTOMY FOR SUBDURAL EVACUATION performed by Sita Lara. Marizol Savage MD at [address] Right 2021    EXPOSE AND REPLACE PROGRAMMABLE SHUNT VALVE performed by Sita Lara.  Marizol Savage MD at [address]  age 11    TUMOR EXCISION      Brain    UPPER GASTROINTESTINAL ENDOSCOPY         Immunization History:   Immunization History   Administered Date(s) Administered    COVID-19, Avalos Peter, PF, 30mcg/0.3mL 2021, 2021    Influenza Virus Vaccine 10/06/2014       Active Problems:  Patient Active Problem List   Diagnosis Code    Left knee pain M25.562    Tibial plateau fracture, left S82.142A    DM2 (diabetes mellitus, type 2) (HCC) E11.9    HTN (hypertension) I10    High cholesterol E78.00    Subdural hematoma (HCC) S06.5X9A    Atrial fibrillation (HCC) I48.91    Facial paralysis on left side G51.0       Isolation/Infection:   Isolation            No Isolation          Patient Infection Status       None to display            Nurse Assessment:  Last Vital Signs: BP (!) 145/84 Pulse 69   Temp 97.6 °F (36.4 °C) (Oral)   Resp 16   Ht 5' 10\" (1.778 m)   Wt 139 lb 1.8 oz (63.1 kg)   SpO2 96%   BMI 19.96 kg/m²     Last documented pain score (0-10 scale): Pain Level: 0  Last Weight:   Wt Readings from Last 1 Encounters:   08/06/21 139 lb 1.8 oz (63.1 kg)     Mental Status:  oriented, alert and coherent    IV Access:  - None    Nursing Mobility/ADLs:  Walking   Assisted  Transfer  Independent  Bathing  Assisted  Dressing  Assisted  Toileting  Assisted  Feeding  Independent  Med Admin  Independent  Med Delivery   whole    Wound Care Documentation and Therapy:        Elimination:  Continence:   · Bowel: No; At times  · Bladder: No; At times  Urinary Catheter: None   Colostomy/Ileostomy/Ileal Conduit: No       Date of Last BM: ***    Intake/Output Summary (Last 24 hours) at 8/10/2021 1100  Last data filed at 8/10/2021 0955  Gross per 24 hour   Intake 460 ml   Output 200 ml   Net 260 ml     I/O last 3 completed shifts: In: 200 [P.O.:430]  Out: 200 [Urine:200]    Safety Concerns: At Risk for Falls, History of Seizures and Aspiration Risk    Impairments/Disabilities:      Speech, Vision and Hearing    Nutrition Therapy:  Current Nutrition Therapy:   - Oral Diet:  General    Routes of Feeding: Oral  Liquids: Thin Liquids  Daily Fluid Restriction: no  Last Modified Barium Swallow with Video (Video Swallowing Test): not done    Treatments at the Time of Hospital Discharge:   Respiratory Treatments:   Oxygen Therapy:  is not on home oxygen therapy.   Ventilator:    - No ventilator support    Rehab Therapies: Physical Therapy, Occupational Therapy and Speech/Language Therapy  Weight Bearing Status/Restrictions: No weight bearing restirctions  Other Medical Equipment (for information only, NOT a DME order):  walker  Other Treatments:     Patient's personal belongings (please select all that are sent with patient):  Glasses, Hearing Aides mounika, Alyssia    RN SIGNATURE:  Electronically signed by Mary Fierro RN on 8/11/21 at 3:44 PM EDT    CASE MANAGEMENT/SOCIAL WORK SECTION    Inpatient Status Date: 8/5/2021     Readmission Risk Assessment Score:  Readmission Risk              Risk of Unplanned Readmission:  15           Discharging to Facility/ [address] [place] [address]  Phone: [phone]  Fax: [phone]      / signature: Electronically signed by TAVON De La O on 8/11/21 at 7:30 AM EDT    PHYSICIAN SECTION    Prognosis: Good    Condition at Discharge: Stable    Rehab Potential (if transferring to Rehab): Good    Recommended Labs or Other Treatments After Discharge:   PT/OT Evaluate and Treat. Speech Evaluate and Treat. Incisional Care: Open to air. Wash incision daily with warm soapy water or shower daily, and pat dry with clean dry towel. Staples can be removed on 8/17/2021    Follow up w/Dr. Brenda Rivera on 8/24/2021 at 2:15 PM. Patient will have a scheduled CT Head done prior to the appointment. Patient will need to have CT Head and see Dr. Brenda Rivera prior to starting Eliquis      Physician Certification: I certify the above information and transfer of Ramses Guaman  is necessary for the continuing treatment of the diagnosis listed and that he requires [facility] for less 30 days.      Update Admission H&P: No change in H&P    PHYSICIAN SIGNATURE:  Electronically signed by Dr. Orlin Houston MD on 8/11/21 at 3:21 PM EDT

## 2021-08-10 NOTE — DISCHARGE SUMMARY
INTERNAL MEDICINE    RESIDENT DISCHARGE SUMMARY   Discharge Summaries      Patient ID: Tierney Vinson   Gender: male      :  1945  AGE: 77 y.o. MRN:  [id number]  Code Status: Full Code   PCP: Shireen Ganser, MD    Admit date:  2021      Admitting Physician:  Nola Lawson DO    Discharge Physician: Capo Pardo MD     Admission Condition:  poor  Discharged Condition:  fair     Discharge Diagnoses: Active Hospital Problems    Diagnosis Date Noted    Atrial fibrillation [facility]) [I48.91] 08/10/2021    Facial paralysis on left side [G51.0] 08/10/2021    Subdural hematoma ([facility] [address] [S06.5X9A] 2021    DM2 (diabetes mellitus, type 2) ([facility] [address] [E11.9] 2015       The patient was seen and examined on day of discharge and this discharge summary is in conjunction with any daily progress note from day of discharge. Hospital Course:     CC on presentation: no chief complaint on file     Tierney Vinson is a 61-year-old male with a past medical history of meningiomas status post resection in  in [address] [place] [address], type II DM, CKD, diastolic CHF hypertension and hyperlipidemia who presented to [facility] on 21 complaining of slurred speech, left-sided facial droop and seizure activity according to wife.  Pt had woken up in the afternoon with a headache , dull, 4/10, no radiation. He was seated in a chair when he started shaking his upper limbs which progressed to his lower limbs and this lasted approximately 1 minute and patient became unresponsive for about 10 mins.  Patient started becoming responsive after EMS arrived. Patient reported a history of mechanical falls over the past 5 days ( - ). Pt reported being on eliquis for a hx of atrial fibrillation. [facility] ED patient did a CT scan without contrast which revealed a large right subdural hematoma acute on chronic which is 1.5 cm in thickness with 7 mm right to left midline shift. Was given Kcentra and Keppra in the ED. Eliquis was held d/t SDH. Patient was transferred to [facility] ICU for every hour neuro checks and further management. Repeat head CT w/o contrast showed a slightly larger subdural hematoma that was 1.7 cm in thickness and stable 7 mm right to L midline shift. Because patient had a very old  shunt with a nonprogrammable valve, neurosurgery thought it best to go ahead and drain the SDH and replace the valve to a programmable type. This occurred on 8/5/21. Patient had RICHA drain placed which continued to drain bloody fluid. On 8/6/21 Neurosurgery determined patient was in stable condition to be transferred to the floor from ICU. On the floor strict protocol via neurosurgery was followed for s/p craniotomy for subdural evacuation, in addition to managing the patient's other medical conditions (HTN, T2DM). Patient's drain was removed 8/9/21 and deemed stable for discharge to SNF per neurosurgery recs and PT/OT recs, which were as follows: Decreased functional mobility; Decreased ADL status; Decreased endurance; Decreased balance; Decreased fine motor control. Therefore it is recommended that the patient have 3-5 sessions per week of Occupational Therapy at d/c to increase the patient's independence. Patient was also evaluated by SLP: Cognitive linguistic impairment marked by perseverations, reduced attention to detail, and recall deficits. Dysarthria is baseline per pt and family report; Continue speech/language therapy to address above goals, 2-4 x/week even on d/c. On 8/9/21 the  spoke with the patient and his family about discharge to [facility] or [facility]. On 8/10/21 it was decided that patient will be discharged to [facility]. Please see below for discharge medications and other follow-up recommendations. On the date of discharge, the patient reported feeling stable.  The patient was found to not be in any acute distress, with vital signs within normal limits, and no new abnormalities on physical examination. Further, the patient expressed appropriate understanding of, and agreement with, the discharge recommendations, medications, and plan. Disposition:  Long-Term Acute Care    Physical Exam Performed:   /72   Pulse 79   Temp 97.4 °F (36.3 °C) (Oral)   Resp 18   Ht 5' 10\" (1.778 m)   Wt 139 lb 1.8 oz (63.1 kg)   SpO2 98%   BMI 19.96 kg/m²     Physical Exam  Constitutional:       Comments: Baseline R-side facial droop   HENT:      Head: Normocephalic. Comments: Drain removed; incision C/D/I  Eyes:      Extraocular Movements: Extraocular movements intact. Conjunctiva/sclera: Conjunctivae normal.      Pupils: Pupils are equal, round, and reactive to light. Cardiovascular:      Rate and Rhythm: Normal rate. Rhythm irregular. Heart sounds: Normal heart sounds. Pulmonary:      Breath sounds: Normal breath sounds. Abdominal:      Palpations: Abdomen is soft. Neurological:      Mental Status: He is alert and oriented to person, place, and time. Psychiatric:         Mood and Affect: Mood normal.         Labs: For convenience and continuity at follow-up the following most recent labs are provided:    CBC:    Lab Results   Component Value Date    WBC 5.3 08/10/2021    HGB 11.5 08/10/2021    HCT 35.1 08/10/2021     08/10/2021       Renal:    Lab Results   Component Value Date     08/10/2021    K 4.1 08/10/2021    K 4.6 08/04/2021     08/10/2021    CO2 28 08/10/2021    BUN 21 08/10/2021    CREATININE 1.0 08/10/2021    CALCIUM 9.3 08/10/2021    PHOS 2.9 08/06/2021         Significant Diagnostic Studies    Radiology:   CT HEAD WO CONTRAST   Final Result      1. Significantly decreased size of the right subdural hematoma status post drainage. No new intracranial hemorrhage. 2. Stable advanced atrophy of the brainstem and large area of left cerebellar encephalomalacia      CT Head wo Contrast   Final Result      1.   Status post right subdural drain placement with decreased right cerebral convexity subdural hematoma and decreased 2 mm leftward midline shift. CT head without contrast   Final Result      Stable right subdural hematoma with leftward subfalcine herniation. Right-sided ventriculostomy catheter without hydrocephalus. Extensive sinus disease again noted. Consults:   IP CONSULT TO NEUROSURGERY  IP CONSULT TO PHARMACY  IP CONSULT TO CRITICAL CARE    Discharge Medications:   Discharge Medication List as of 8/11/2021  4:58 PM           Details   levETIRAcetam (KEPPRA) 1000 MG tablet Take 1 tablet by mouth 2 times daily, Disp-60 tablet, R-3Normal      sennosides-docusate sodium (SENOKOT-S) 8.6-50 MG tablet Take 2 tablets by mouth 2 times daily for 7 days, Disp-28 tablet, R-0Normal              Details   tamsulosin (FLOMAX) 0.4 MG capsule Take 0.4 mg by mouth nightlyHistorical Med      insulin glargine (LANTUS) 100 UNIT/ML injection vial Inject 14 Units into the skin nightlyHistorical Med      carboxymethylcellulose (THERATEARS) 1 % ophthalmic gel Place 1 drop into the left eye 3 times daily as needed for Dry EyesHistorical Med      dicyclomine (BENTYL) 10 MG capsule Take 10 mg by mouth 4 times daily (before meals and nightly) Historical Med      furosemide (LASIX) 20 MG tablet Take 20 mg by mouth 4 times daily as needed Historical Med      pravastatin (PRAVACHOL) 80 MG tablet Take 40 mg by mouth nightly Historical Med      omeprazole (PRILOSEC) 40 MG capsule Take 40 mg by mouth daily. Time Spent on discharge is more than 45 minutes in the examination, evaluation, counseling and review of medications and discharge plan.     Signed:    René Blackburn MD   Internal Medicine Resident, PGY-1  8/11/2021

## 2021-08-10 NOTE — PLAN OF CARE
Problem: Skin Integrity:  Goal: Absence of new skin breakdown  Description: Absence of new skin breakdown  Outcome: Ongoing  Skin is intact except for surgical incision. Pt repositioned as needed. Problem: Falls - Risk of:  Goal: Will remain free from falls  Description: Will remain free from falls  8/10/2021 0807 by Татьяна Santoyo RN  Outcome: Ongoing  Fall precautions in place. Bed is in lowest position, wheels locked, bed alarm on, non skid socks on. Call light and bedside table within reach. Pt calls out appropriately. Pt is up x1 with gb and walker. Will continue to assess and monitor. Problem: Pain:  Goal: Pain level will decrease  Description: Pain level will decrease  8/10/2021 0807 by Татьяна Santoyo RN  Outcome: Ongoing  Pt denies pain at this time. Pain medication available if needed. Problem: HEMODYNAMIC STATUS  Goal: Patient has stable vital signs and fluid balance  8/10/2021 0807 by Татьяна Santoyo RN  Outcome: Ongoing  VSS. Tolerating PO diet. Voiding adequately.

## 2021-08-10 NOTE — PROGRESS NOTES
Speech Language Pathology  Facility/Department: [facility] 5T ORTHO/NEURO  Dysphagia Daily Treatment Note  Discharge     NAME: Aleks Johnson  : 1945  MRN: [id number]    Patient Diagnosis(es):   Patient Active Problem List    Diagnosis Date Noted    Atrial fibrillation ([facility] [address] 08/10/2021    Facial paralysis on left side 08/10/2021    Subdural hematoma ([facility] [address] 2021    Tibial plateau fracture, left 2015    DM2 (diabetes mellitus, type 2) ([facility] [address] 2015    HTN (hypertension) 2015    High cholesterol 2015    Left knee pain 04/10/2014     Allergies: Allergies   Allergen Reactions    Iv Contrast [Iodides] Other (See Comments)     Severe hypotension    Sulfa Antibiotics Hives        CT brain 21  Impression       1. Significantly decreased size of the right subdural hematoma status post drainage. No new intracranial hemorrhage. 2. Stable advanced atrophy of the brainstem and large area of left cerebellar encephalomalacia         CXR not performed this visit       Chart reviewed. Medical Diagnosis:subdural hematoma  Treatment Diagnosis:dysphagia     BSE Impression 21  Pt with baseline oral dysphagia s/p crani 1995 + residual L facial droop. Anterior loss exhibited with all consistencies in addition to prolonged oral prep and posterior spillage - baseline per pt and family. Pt with congested volitional cough prior to PO then exhibited delayed congested cough following initial trials of thin liquids which then resolved - suspect this was r/t initial coughing. Pt consumed 3 oz thins via straw uninterrupted w/o immediate or delayed coughing or wet voice. Pt with impulsivity with PO intake, overstuffing oral cavity with solids. Congested coughing exhibited with puree x 1 after consuming ~3 oz of puree, appeared r/t impulsivity + attempting to speak with PO + suspected backflow. Son-in-law reported this to be baseline and appeared c/w reflux.  Recommend initiate regular

## 2021-08-10 NOTE — PROGRESS NOTES
Patient is alert and oriented. Vital signs are stable. Neuro assessment unchanged thus far this shift. NIH is 3. Patient tolerates PO fluids. Patient voiding adequate amount of urine. Bed is in the lowest position. Bed alarm is activated. Call light is within reach. Will continue to monitor and reassess.

## 2021-08-10 NOTE — PROGRESS NOTES
Progress Note    Admit Date: 8/5/2021  Day: 2   Diet: ADULT DIET; Regular    CC on presentation: slurred speech, chronic facial droop (right side), seizure    Interval history:   - no overnight events  - denies h/a, dizziness, lightheadedness; SOB, chest pain  - RICHA drain removed yesterday afternoon    HPI:   Lazarus Hess is a 73-year-old male with a past medical history of meningiomas status post resection in 1983 in [address] [place] [address],[facility], type II DM, CKD, diastolic CHF hypertension and hyperlipidemia who presented to [facility] complaining of slurred speech, left-sided facial droop and seizure activity according to wife.  Pt had woken up in the afternoon with a headache , dull, 4/10, no radiation. He was seated in a chair watching a ball game when he started shaking his upper limbs which progressed to his lower limbs and this lasted approximately 1 minute and patient became unresponsive for about 10 mins.  Patient started becoming responsive after EMS arrived. Patient reports a history of mechanical falls over the past 5 days. Pt reports being on eliquis. Denies vision changes, nausea, vomiting, chest pain, palpitations, shortness of breath.   [address] [place] [address] ED patient had a CT scan without contrast which revealed a large right subdural hematoma acute on chronic which is 1.5 cm in thickness with 7 mm right to left midline shift. Was given Kcentra and Keppra in the ED. patient was transferred to [address] [place] [address] ICU for every hour neuro checks and further management. Repeat head CT w/o contrast showed a slightly larger subdural hematoma that was 1.7 cm in thickness and stable 7 mm right to L midline shift.       8/5: OR to drain SDH via neurosurgery; expose and replace programmable shunt valve    8/6: ok to transfer from ICU to [facility]    Medications:     Scheduled Meds:   insulin lispro  0-12 Units Subcutaneous 4x Daily AC & HS    levETIRAcetam  1,000 mg Oral BID    lidocaine 1 % injection  5 mL Intradermal Once    sodium chloride flush  5-40 mL Intravenous 2 times per day    sennosides-docusate sodium  2 tablet Oral BID    polyethylene glycol  17 g Oral Daily    lisinopril  10 mg Oral Daily    dicyclomine  10 mg Oral 4x Daily AC & HS    insulin glargine  14 Units Subcutaneous Nightly    pantoprazole  40 mg Oral QAM AC    pravastatin  40 mg Oral Nightly    tamsulosin  0.4 mg Oral Nightly    sodium chloride flush  5-40 mL Intravenous 2 times per day    heparin (porcine)  5,000 Units Subcutaneous 3 times per day     Continuous Infusions:   sodium chloride      dextrose      sodium chloride       PRN Meds:sodium chloride flush, sodium chloride, sodium chloride flush, acetaminophen, ondansetron **OR** ondansetron, labetalol, glucose, dextrose, glucagon (rDNA), dextrose, furosemide, sodium chloride, oxyCODONE, promethazine **OR** ondansetron, dextran 70-hypromellose    Objective:   Vitals:   T-max:  Patient Vitals for the past 8 hrs:   BP Temp Temp src Pulse Resp SpO2   08/10/21 0709 (!) 145/84 97.6 °F (36.4 °C) Oral 69 16 96 %   08/10/21 0251 (!) 152/93 98.1 °F (36.7 °C) Oral 75 16 94 %       Intake/Output Summary (Last 24 hours) at 8/10/2021 0936  Last data filed at 8/10/2021 0431  Gross per 24 hour   Intake 410 ml   Output 200 ml   Net 210 ml       Review of Systems  As per Interval hx    Physical Exam  Constitutional:       Comments: Baseline Right side facial droop   HENT:      Head:      Comments: Posterior incision is C/D/I; drain removed  Cardiovascular:      Rate and Rhythm: Normal rate. Rhythm irregular. Pulmonary:      Breath sounds: Normal breath sounds. Abdominal:      Palpations: Abdomen is soft. Skin:     General: Skin is warm. Neurological:      Mental Status: He is alert and oriented to person, place, and time.    Psychiatric:         Mood and Affect: Mood normal.         LABS:    CBC:   Recent Labs     08/08/21  0619 08/09/21  0619 08/10/21  0604   WBC 7.2 5.8 5.3   HGB 11.7* 11.4* 11.5*   HCT 36.3* subdural evacuation 2/2 stable right-sided SDH  - neurosurgery on board; the following are their recommendations  - onofre exam q4 hours  - if change in exam or new deficit - call neurosurg and repeat CTH    SDH  - cefazolin x 6 doses post-op, or while drain in place  - Continue drain. Drain open to gravity. Do NOT milk drain  - Incisional Care: Open to air. Wash incision daily with warm soapy water or shower daily, and pat dry with clean dry towel. Paint with CHG swab. - Maintain SBP <160; If PRN med insufficient, then may start Nicardipine infusion  - Keep Plt >100k & INR <1.4  - Hold all full dose anticoagulation & antiplatelet for 2 weeks  - Repeat head CT 8/9/21: Significantly decreased size of the right subdural hematoma status post drainage. No new intracranial hemorrhage; Stable advanced atrophy of the brainstem and large area of left cerebellar encephalomalacia    Cerebral edema:  - Keep Na within normal limits  - Keep HOB >30 degrees  - If central venous access is needed please use subclavian vs femoral - No IJ as this can decrease venous return and worsen cerebral edema    GI Prophylaxis: Pepcid  Seizure prophylaxis: Keppra 1000 mg BID  Bowel Regimen: Senokot-S and Glycolax  Pain control: PRN Roxicodone  Mobility:  - Advance as tolerates  - PT/OT consulted, appreciate recs  Diet: Advance as tolerates  DVT Prophylaxis: SCD's & SQ Heparin    2. Hyperkalemia - resolved   - K today: 4.1   - (8/6-10/21: 5.6 - 5.1 - 4.5 - 4.7- 4.3 - 4.4 - 4.1)    3. HTN  - Keep SBP < 160 per neuro team  - lisinopril restarted on 8/7/21  - labetalol 10 mg PRN   - BP: 113-159/79-93    4. T2DM  - insulin glargine (Lantus)   - insulin lispro   - POC glucose: 147    5. Atrial Fibrillation   - hold Eliquis for at least 2 weeks d/t SDH    D/C plans:   8/10: working on d/c to [facility]    8/9: PT/OT  Recs: Decreased functional mobility; Decreased ADL status;  Decreased endurance; Decreased balance; Decreased fine motor control. 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  Dysarthria is baseline per pt and family report; Continue speech/language therapy to address above goals, 2-4 x/week even on d/c    8/9:Neurosurgery recs: ok to d/c after drain removal     Code Status: Full Code  FEN: Adult diet - regular  PPX: Keppra, Protonix, SCDs and Heparin   DISPO: DEBBIE Calabrese MD, PGY-1  08/10/21  9:36 AM    This patient has been staffed and discussed with Kiley Hoover MD.

## 2021-08-10 NOTE — CARE COORDINATION
Case Management Daily Note                    Date: 8/10/2021     Patient Name: Jeimy Red    Date of Admission: 8/5/2021 12:39 AM  YOB: 1945    Length of Stay: 5  GMLOS: 4.6         Patient Admission Status: Inpatient  Diagnosis:Subdural hematoma ([address] [B35.5W4E]     ________________________________________________________________________________________  Discharge Plan: SNF:  [address]: Payor: Ana Luisa Valdovinos / Plan: Trish Jenkins ESSENTIAL/PLUS / Product Type: *No Product type* /   Is pre-cert/notification needed:     Tentative discharge date: 8/11    Current barriers: Cert     Referrals completed: SNF: [facility] , [address]     Resources/ information provided: [facility] List   ________________________________________________________________________________________  PT AM-PAC: 16 / 24 per last evaluation on: 8/9    OT AM-PAC: 15 / 24 per last evaluation on: 8/9    DME Needs for discharge: defer  ________________________________________________________________________________________  Notes/Plan of Care:   SW placed call to Demetrice at [facility] ([phone]) no private rooms available this AM and not yet known if they will have a bed. SW received call from [address] they can accept patient ([phone]). MARIO  Met with patient at bedside. He is in agreement with plan. MARIO placed call to patient's wife multiple times ([phone]) number comes up as busy. MARIO called step-daughter Joel Chandra ([phone]) to make sure someone was aware     MARIO notified [address] to start pre-cert. Douglas Comer and/or his family were provided with choice of provider; he and/or his family are in agreement with the discharge plan at this time.     Care Transition Patient: Alessia Padgett, TAVON  [facility]   Case Management Department  Ph: [phone]

## 2021-08-10 NOTE — PROGRESS NOTES
Speech Language Pathology  Facility/Department: [facility] 5T ORTHO/NEURO  Speech / Language / Cognitive Daily Treatment Note  NAME: Linda Helms  : 1945  MRN: [id number]    Date of Eval: 8/10/2021  Evaluating Therapist: VINCENT Garcia    Patient Diagnosis(es): has Left knee pain; Tibial plateau fracture, left; DM2 (diabetes mellitus, type 2) ([facility] HTN (hypertension); High cholesterol; Subdural hematoma ([facility] Atrial fibrillation ([facility] and Facial paralysis on left side on their problem list.       Chart reviewed. Pain: None indicated    Medical diagnosis: SDH  Treatment diagnosis: cognitive linguistic impairment    Subjective: Pt in bed upon entry. Attended session alone. Pleasant and cooperative. Treatment:  Pt seen to address the following goals:  Goal 1: Pt will demonstrate <2 perseverations for a task with min cues. : Pt consistently exhibiting 3 perseverations for each tx task. Mod cues to extinguish. Poor insight into perseverative responses. Continue goal.  8/10-goal met-  Pt had no instances of perseveration through out this session. Goal 2: Pt will complete graded executive function tasks with adequate attention to detail and self-correction of errors with mod cues. : Functional money math reasoning - verbal problems: 63% accuracy with min cues improving to 88% accuracy with min-mod cues. Identifying items described - providing multiple answers: 90% accuracy with mod cues; limited by perseverations and cues required for increased cognitive flexibility. Continue goal.  8/10- pt was able to describe steps to complete a task but required mod cues for providing more details. Pt was able to state 2-3 uses for a common object with min cues. No cues required for attention. Pt able to respond in a timely manner. Goal 3: Pt will complete graded recall tasks with 80% accuracy or min cues. : Dependent to recall ST and skills targeted.  Recall of details from verbally presented information - paragraph level: 50% accuracy with min cues. Educated pt to use of visualization and required max fading to mod cues to implement strategy with auditory information. Continue goal.  8/10-pt was able to recall 90% of details following listening to short paragraph of information. Reviewed strategies to aid with memory. Goal 4: Pt will participate in ongoing cognitive linguistic assessment. 8/8: Goal not targeted. Continue goal.      Education: Educated pt to role of SLP, purpose of visit, impact SDH can have on cognition, deficits noted, skills targeted with tx, and recommendations. Pt indicated comprehension. 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 Dysarthria is baseline per pt and family report. Plan:  Continue speech/language therapy to address above goals, 2-4 x/week x LOS  DC recommendations: ongoing ST indicated  D/W nursing, Paul Cottrell, prior to session only  Needs met prior to leaving room, call button in reach. Treatment time: [address], [address] [facility] [address]  Speech-Language Pathologist  Pager [phone]      If patient is discharged prior to next session, this note will serve as discharge summary.

## 2021-08-10 NOTE — PROGRESS NOTES
Physical Therapy  Facility/Department: [place] [address] 5T ORTHO/NEURO  Daily Treatment Note  NAME: Kati Duran  : 1945  MRN: [id number]    Date of Service: 8/10/2021    Discharge Recommendations:  Kati Duran scored a 16/24 on the AM-PAC short mobility form. Current research shows that an AM-PAC score of 17 or less is typically not associated with a discharge to the patient's home setting. Based on the patient's AM-PAC score and their current functional mobility deficits, it is recommended that the patient have 3-5 sessions per week of Physical Therapy at d/c to increase the patient's independence. Please see assessment section for further patient specific details. PT Equipment Recommendations  Equipment Needed: No    Assessment   Assessment: Pt tolerated session well but limited by generalized deconditioning and decreased motor control. Presented with fair trunk and LE control during bed mobility with the HOB elevated and use of the handrails to get EOB. Transfers and gait training were unsteady and unsafe; gait mechanics are very inefficient posing a high fall risk. Treatment Diagnosis: impaired functional mobility 2/2 decreased balance and endurance  Decision Making: Medium Complexity  PT Education: Goals;PT Role;Plan of Care;General Safety; Functional Mobility Training  Patient Education: Pt verbalized understanding but may need reinforcement  REQUIRES PT FOLLOW UP: Yes     Patient Diagnosis(es): There were no encounter diagnoses. has a past medical history of Arthritis, Brain tumor ([address] Cancer ([address] Deaf, Diabetes ([address] Diabetes mellitus ([address] Endocarditis, High blood pressure, and Peptic ulcer. has a past surgical history that includes tumor excision; Cardiac surgery; Appendectomy (age 4); Tonsillectomy (age 10); Upper gastrointestinal endoscopy (2013); craniotomy (Right, 2021); and craniotomy (Right, 2021).     Restrictions  Position Activity Restriction  Other position/activity restrictions: Progressive Amb, HOB elevated 30 degrees  Subjective   General  Additional Pertinent Hx: Pt is a 77 y.o. male adm 8/5 with subdural hematoma. Pt presented to [facility] ED for headache, seizure, facial droop and dysarthria which began approximately 45 minutes prior to arrival. Head CT:Large right subdural hematoma with mixed attenuation suggesting acute on chronic or active bleeding. There is 7 mm right to left midline shift. Remote left cerebellar infarct. Transferred to [facility]   Repeat head CT:Stable right subdural hematoma with leftward subfalcine herniation. Pt s/p TREPHINE CRANIOTOMY FOR SUBDURAL EVACUATION on 8/5. PMH: arthritis, prostate CA, DM, HTN, brain tumor s/p removal 1983 and 1995  Referring Practitioner: NITA Luevano  Subjective  Subjective: Pt found supine. Agreeable to PT. Speech slurred and difficult to understand at times.  c/o mild head pain  Orientation  Orientation  Overall Orientation Status: Within Normal Limits  Objective   Bed mobility  Supine to Sit: Stand by assistance  Transfers  Sit to Stand: Minimal Assistance (during turns)  Stand to sit: Contact guard assistance  Ambulation  Ambulation?: Yes  Ambulation 1  Device: Rolling Walker  Assistance: CGA   Quality of Gait: shuffles with decreased bilat step length/height, cues to stay within walker - pushes too far ahead, unsteady and impulsive at times  Distance: 50ft  Balance  Sitting - Static: Good  Sitting - Dynamic: Good  Standing - Static: Fair  Standing - Dynamic: Fair  Exercises  Straight Leg Raise: x10 B  Quad Sets: 15x5 sec B  Heelslides: x15 B  Gluteal Sets: 15x5 sec B  Hip Abduction: x15 B  Ankle Pumps: x20 B     AM-PAC Score  AM-PAC Inpatient Mobility Raw Score : 16 (08/10/21 1048)  AM-PAC Inpatient T-Scale Score : 40.78 (08/10/21 1048)  Mobility Inpatient CMS 0-100% Score: 54.16 (08/10/21 1048)  Mobility Inpatient CMS G-Code Modifier : CK (08/10/21 1048)    Goals  Short term goals  Time Frame for Short term goals: By discharge  Short term goal 1: Sup to sit SBA. Ongoing  Short term goal 2: Pt will transfer sit to stand SBA. Ongoing  Short term goal 3: Pt will amb >50' with LRAD SBA.   Ongoing    Plan    Plan  Times per week: 5-7  Current Treatment Recommendations: Balance Training, Functional Mobility Training, Gait Training, Endurance Training, Patient/Caregiver Education & Training, Safety Education & Training  Safety Devices  Type of devices: Call light within reach, Chair alarm in place, Nurse notified, Left in chair     Therapy Time   Individual Concurrent Group Co-treatment   Time In 1040         Time Out 1103         Minutes 23         Timed Code Treatment Minutes: [address], PTA   Met goal [address] [facility], [address] [place] [address]

## 2021-08-10 NOTE — PROGRESS NOTES
NEUROSURGERY PROGRESS NOTE    8/10/2021 10:59 AM                               Bonilla Peck                      LOS: 5 days   POD#5  s/p Procedure(s) (LRB):  TREPHINE CRANIOTOMY FOR SUBDURAL EVACUATION (Right)  EXPOSE AND REPLACE PROGRAMMABLE SHUNT VALVE (Right)    Subjective: Patient sitting up in bed upon entering the room. No acute events overnight. Patient has no specific complaints this morning. Physical Exam:  Patient seen and examined    Vitals:    08/10/21 0709   BP: (!) 145/84   Pulse: 69   Resp: 16   Temp: 97.6 °F (36.4 °C)   SpO2: 96%     GCS:  4 - Opens eyes on own  5 - Alert and oriented  6 - Follows simple motor commands  General: Well developed. Alert and cooperative in no acute distress.     HENT: atraumatic, neck supple  Eyes: Optic discs: Not tested  Pulmonary: unlabored respiratory effort  Cardiovascular:  Warm well perfused. No peripheral edema  Gastrointestinal: abdomen soft, NT, ND     Neurological:  Mental Status: Awake, alert, oriented x 4, speech dysarthic  Attention: Intact  Language: Dysarthria (chronic)  Sensation: Intact to all extremities to light touch  Coordination: Intact     Cranial Nerves:  II: Visual acuity not tested, denies new visual changes / diplopia  III, IV, VI: PERRL, 3 mm bilaterally, EOMI, no nystagmus noted  V: Facial sensation intact bilaterally to touch  VII: Left side facial weakness (chronic)  VIII: Hearing intact bilaterally to spoken voice  IX: Palate movement equal bilaterally  XI: Shoulder shrug equal bilaterally  XII: Tongue midline     Musculoskeletal:   Gait: Not tested   Assist devices: None   Tone: Normal  Motor strength:     Right  Left      Right  Left    Deltoid  5 5   Hip Flex  5 5   Biceps  5 5   Knee Extensors  5 5   Triceps  5 5   Knee Flexors  5 5   Wrist Ext  5 5   Ankle Dorsiflex. 5 5   Wrist Flex  5 5   Ankle Plantarflex.   5 5   Handgrip  5 5   Ext Derrick Longus  5 5   Thumb Ext  5 5              Incision: CDI    Radiological Findings:  CT HEAD WO CONTRAST  Result Date: 8/4/2021  Large right subdural hematoma with mixed attenuation suggesting acute on chronic or active bleeding. There is 7 mm right to left midline shift. Remote left cerebellar infarct.     CT Head WO Contrast  Result Date: 8/4/2021  Stable exam. Acute subdural hematoma along the right cerebral convexity measuring up to 17 mm in maximal thickness, stable. Trace acute subdural hematoma along the falx cerebri and the bilateral tentorium, stable. 7 mm right to left midline shift, stable. Stable right parietal approach ventricular catheter. No hydrocephalus. Encephalomalacia in the left cerebellar hemisphere and left middle cerebellar peduncle/left wyatt, stable      CT head without contrast  Result Date: 8/5/2021  Stable right subdural hematoma with leftward subfalcine herniation. Right-sided ventriculostomy catheter without hydrocephalus. Extensive sinus disease again noted. CT Head wo Contrast  Result Date: 8/5/2021  Status post right subdural drain placement with decreased right cerebral convexity subdural hematoma and decreased 2 mm leftward midline shift. CT HEAD WO CONTRAST  Result Date: 8/9/2021  1. Significantly decreased size of the right subdural hematoma status post drainage. No new intracranial hemorrhage. 2. Stable advanced atrophy of the brainstem and large area of left cerebellar encephalomalacia    Labs:  Recent Labs     08/10/21  0604   WBC 5.3   HGB 11.5*   HCT 35.1*          Recent Labs     08/10/21  0604      K 4.1      CO2 28   BUN 21*   CREATININE 1.0   GLUCOSE 147*   CALCIUM 9.3   MG 1.90       No results for input(s): PROTIME, INR, APTT in the last 72 hours.     Patient Active Problem List    Diagnosis Date Noted    Atrial fibrillation ([address] 08/10/2021    Facial paralysis on left side 08/10/2021    Subdural hematoma ([address] 08/04/2021    Tibial plateau fracture, left 04/06/2015    DM2 (diabetes mellitus, type 2) ([address] 04/06/2015    HTN (hypertension) 04/06/2015    High cholesterol 04/06/2015    Left knee pain 04/10/2014       Assessment:  Patient is a 77 y.o. male s/p Procedure(s) (LRB):  TREPHINE CRANIOTOMY FOR SUBDURAL EVACUATION (Right)  EXPOSE AND REPLACE PROGRAMMABLE SHUNT VALVE (Right) 2/2 stable acute subdural hematoma along the right cerebral convexity with brain compression and midline shift. .     Plan:  1. Neurologically stable  2. Neurologic exams frequency: Q4H  3. For change in exam MUST contact neurosurgery team along with critical care or primary team  4. SDH:  - s/p trephine crani for evacuation of SDH  - Repeat CT Head revealed decreased right cerebral convexity subdural hematoma  - Incisional Care: Open to air. Wash incision daily with warm soapy water or shower daily, and pat dry with clean dry towel. Paint with CHG swab. - Maintain SBP <160; If PRN med insufficient, then may start Nicardipine infusion  - Keep Plt >100k & INR <1.4  - Hold all full dose anticoagulation & antiplatelet for 2 weeks  5. Cerebral edema:  - Keep Na within normal limits  - Keep HOB >30 degrees  - If central venous access is needed please use subclavian vs femoral - No IJ as this can decrease venous return and worsen cerebral edema  6. GI Prophylaxis: Pepcid  7. Seizure prophylaxis: Keppra 1000 mg BID  8. Bowel Regimen: Senokot-S and Glycolax  9. Pain control: PRN Roxicodone  10. Mobility:  - Advance as tolerates  - PT/OT consulted, appreciate recs  11. Diet: Advance as tolerates  12. DVT Prophylaxis: SCD's & SQ Heparin  13. Appreciate critical care team assistance in management  14. Will follow inpatient. Please call with any questions or decline in neurological status    DISPO: OK to DC from neurosurgery standpoint. Dispo timing to be determined by primary team once patient is medically stable for discharge. Patient was discussed with Dr. Jose J Lee who agrees with above assessment and plan.      Electronically signed by: Jose Guadalupe Arevalo APRN - CNP, APRN-CNP, 8/10/2021 10:59 AM  [phone]

## 2021-08-11 VITALS
HEIGHT: 70 IN | HEART RATE: 79 BPM | RESPIRATION RATE: 18 BRPM | OXYGEN SATURATION: 98 % | DIASTOLIC BLOOD PRESSURE: 72 MMHG | BODY MASS INDEX: 19.92 KG/M2 | SYSTOLIC BLOOD PRESSURE: 107 MMHG | TEMPERATURE: 97.4 F | WEIGHT: 139.11 LBS

## 2021-08-11 LAB
GLUCOSE BLD-MCNC: 123 MG/DL (ref 70–99)
GLUCOSE BLD-MCNC: 169 MG/DL (ref 70–99)
MAGNESIUM: 1.8 MG/DL (ref 1.8–2.4)
PERFORMED ON: ABNORMAL
PERFORMED ON: ABNORMAL

## 2021-08-11 PROCEDURE — 6370000000 HC RX 637 (ALT 250 FOR IP): Performed by: STUDENT IN AN ORGANIZED HEALTH CARE EDUCATION/TRAINING PROGRAM

## 2021-08-11 PROCEDURE — 83735 ASSAY OF MAGNESIUM: CPT

## 2021-08-11 PROCEDURE — 97530 THERAPEUTIC ACTIVITIES: CPT

## 2021-08-11 PROCEDURE — 97535 SELF CARE MNGMENT TRAINING: CPT

## 2021-08-11 PROCEDURE — 83036 HEMOGLOBIN GLYCOSYLATED A1C: CPT

## 2021-08-11 PROCEDURE — 2580000003 HC RX 258: Performed by: STUDENT IN AN ORGANIZED HEALTH CARE EDUCATION/TRAINING PROGRAM

## 2021-08-11 PROCEDURE — 97129 THER IVNTJ 1ST 15 MIN: CPT

## 2021-08-11 PROCEDURE — 6360000002 HC RX W HCPCS: Performed by: STUDENT IN AN ORGANIZED HEALTH CARE EDUCATION/TRAINING PROGRAM

## 2021-08-11 PROCEDURE — 36415 COLL VENOUS BLD VENIPUNCTURE: CPT

## 2021-08-11 RX ADMIN — POLYETHYLENE GLYCOL 3350 17 G: 17 POWDER, FOR SOLUTION ORAL at 08:14

## 2021-08-11 RX ADMIN — PANTOPRAZOLE SODIUM 40 MG: 40 TABLET, DELAYED RELEASE ORAL at 05:18

## 2021-08-11 RX ADMIN — LEVETIRACETAM 1000 MG: 500 TABLET ORAL at 08:13

## 2021-08-11 RX ADMIN — INSULIN LISPRO 2 UNITS: 100 INJECTION, SOLUTION INTRAVENOUS; SUBCUTANEOUS at 11:58

## 2021-08-11 RX ADMIN — DICYCLOMINE HYDROCHLORIDE 10 MG: 10 CAPSULE ORAL at 11:58

## 2021-08-11 RX ADMIN — LISINOPRIL 10 MG: 10 TABLET ORAL at 08:13

## 2021-08-11 RX ADMIN — HEPARIN SODIUM 5000 UNITS: 5000 INJECTION INTRAVENOUS; SUBCUTANEOUS at 13:19

## 2021-08-11 RX ADMIN — Medication 10 ML: at 08:15

## 2021-08-11 RX ADMIN — HEPARIN SODIUM 5000 UNITS: 5000 INJECTION INTRAVENOUS; SUBCUTANEOUS at 05:18

## 2021-08-11 RX ADMIN — DICYCLOMINE HYDROCHLORIDE 10 MG: 10 CAPSULE ORAL at 05:17

## 2021-08-11 RX ADMIN — Medication 10 ML: at 08:14

## 2021-08-11 RX ADMIN — ACETAMINOPHEN 650 MG: 325 TABLET ORAL at 01:28

## 2021-08-11 ASSESSMENT — PAIN DESCRIPTION - DESCRIPTORS: DESCRIPTORS: ACHING

## 2021-08-11 ASSESSMENT — PAIN SCALES - GENERAL
PAINLEVEL_OUTOF10: 0
PAINLEVEL_OUTOF10: 3
PAINLEVEL_OUTOF10: 0

## 2021-08-11 ASSESSMENT — PAIN DESCRIPTION - FREQUENCY: FREQUENCY: INTERMITTENT

## 2021-08-11 ASSESSMENT — PAIN DESCRIPTION - PROGRESSION: CLINICAL_PROGRESSION: NOT CHANGED

## 2021-08-11 ASSESSMENT — PAIN - FUNCTIONAL ASSESSMENT: PAIN_FUNCTIONAL_ASSESSMENT: PREVENTS OR INTERFERES SOME ACTIVE ACTIVITIES AND ADLS

## 2021-08-11 ASSESSMENT — PAIN DESCRIPTION - LOCATION: LOCATION: HEAD

## 2021-08-11 ASSESSMENT — PAIN DESCRIPTION - PAIN TYPE: TYPE: SURGICAL PAIN

## 2021-08-11 ASSESSMENT — PAIN DESCRIPTION - ONSET: ONSET: ON-GOING

## 2021-08-11 NOTE — PROGRESS NOTES
Report called to [facility]. Incisions cleansed with soap/water CHG swab.

## 2021-08-11 NOTE — CARE COORDINATION
Case Management Daily Note                    Date: 8/11/2021     Patient Name: Ernesto Nunn    Date of Admission: 8/5/2021 12:39 AM  YOB: 1945    Length of Stay: 6  GMLOS: 4.6         Patient Admission Status: Inpatient  Diagnosis:Subdural hematoma [facility]) [S06.5X9A]     ________________________________________________________________________________________  Discharge Plan: SNF:   [address] of [address], [place], [place] [address]  Phone: [phone]  Fax: [phone]      HENS completed:  [id number]    Insurance: Payor: Andrew Mcmahon / Plan: Melinda Junior ESSENTIAL/PLUS / Product Type: *No Product type* /   Is pre-cert/notification needed:     Tentative discharge date: 8/11    Current barriers: Cert     Referrals completed: SNF: [facility] , [address]     Resources/ information provided: [facility] List   ________________________________________________________________________________________  PT AM-PAC: 12 / 24 per last evaluation on: 8/10    OT AM-PAC: 17 / 24 per last evaluation on: 8/10    DME Needs for discharge: defer  ________________________________________________________________________________________  Notes/Plan of Care:   SW rounded on this date. HENS completed. Pending Cert for discharge. Carine Huynh and/or his family were provided with choice of provider; he and/or his family are in agreement with the discharge plan at this time.     Care Transition Patient: Alessia Ross Early Day, [place]  [facility]   Case Management Department  Ph: [phone]

## 2021-08-11 NOTE — PLAN OF CARE
Falls - Risk of:  Goal: Will remain free from falls  Outcome: Ongoing  Note: Follows commands well; alert and cooperative. Fall precautions are in place. HEMODYNAMIC STATUS  Goal: Patient has stable vital signs and fluid balance  Outcome: Ongoing  Note: BP stable 132/89; PT urinating adequately.       Skin Integrity:  Goal: Absence of new skin breakdown  Outcome: Ongoing

## 2021-08-11 NOTE — PROGRESS NOTES
Pt A&O, VSS. Neuro checks stable. Pt voiding adequately per urinal. Surgical site is CD&I with minimal drainage. All fall precautions in place. Pt tolerating fluids and diet well. Call light in reach.

## 2021-08-11 NOTE — PLAN OF CARE
Problem: Falls - Risk of:  Goal: Will remain free from falls  Description: Will remain free from falls  Outcome: Ongoing  Note: Pt will remain free of falls. Bed in lowest position, bed alarm on, call light in reach of pt. Problem: HEMODYNAMIC STATUS  Goal: Patient has stable vital signs and fluid balance  Outcome: Ongoing  Note: Pt's vital signs will remain stable. Encourage fluids for pt.

## 2021-08-11 NOTE — PROGRESS NOTES
Progress Note    Admit Date: 8/5/2021  Day: 6   Diet: ADULT DIET; Regular    CC on presentation: no chief complaint on file (presented with slurred speech, chronic facial droop (right side), possible seizure)    Interval history:   - no acute overnight events  - patient doing well and awaits discharge    HPI:   Quentin Martell is a 41-year-old male with a past medical history of meningiomas status post resection in 1983 in [address] [place] [address],[facility], type II DM, CKD, diastolic CHF hypertension and hyperlipidemia who presented to [facility] complaining of slurred speech, left-sided facial droop and seizure activity according to wife.  Pt had woken up in the afternoon with a headache , dull, 4/10, no radiation. He was seated in a chair watching a ball game when he started shaking his upper limbs which progressed to his lower limbs and this lasted approximately 1 minute and patient became unresponsive for about 10 mins.  Patient started becoming responsive after EMS arrived. Patient reports a history of mechanical falls over the past 5 days. Pt reports being on eliquis. Denies vision changes, nausea, vomiting, chest pain, palpitations, shortness of breath.   [facility] ED patient had a CT scan without contrast which revealed a large right subdural hematoma acute on chronic which is 1.5 cm in thickness with 7 mm right to left midline shift. Was given Kcentra and Keppra in the ED. patient was transferred to [facility] ICU for every hour neuro checks and further management. Repeat head CT w/o contrast showed a slightly larger subdural hematoma that was 1.7 cm in thickness and stable 7 mm right to L midline shift.       8/5: OR to drain SDH via neurosurgery; expose and replace programmable shunt valve    8/6: ok to transfer from ICU to [facility]    Medications:     Scheduled Meds:   insulin lispro  0-12 Units Subcutaneous 4x Daily AC & HS    levETIRAcetam  1,000 mg Oral BID    lidocaine 1 % injection  5 mL Intradermal Once    sodium chloride flush  5-40 mL Intravenous 2 times per day    sennosides-docusate sodium  2 tablet Oral BID    polyethylene glycol  17 g Oral Daily    lisinopril  10 mg Oral Daily    dicyclomine  10 mg Oral 4x Daily AC & HS    insulin glargine  14 Units Subcutaneous Nightly    pantoprazole  40 mg Oral QAM AC    pravastatin  40 mg Oral Nightly    tamsulosin  0.4 mg Oral Nightly    sodium chloride flush  5-40 mL Intravenous 2 times per day    heparin (porcine)  5,000 Units Subcutaneous 3 times per day     Continuous Infusions:   sodium chloride      dextrose      sodium chloride       PRN Meds:sodium chloride flush, sodium chloride, sodium chloride flush, acetaminophen, ondansetron **OR** ondansetron, labetalol, glucose, dextrose, glucagon (rDNA), dextrose, furosemide, sodium chloride, oxyCODONE, promethazine **OR** ondansetron, dextran 70-hypromellose    Objective:   Vitals:   T-max:  Patient Vitals for the past 8 hrs:   BP Temp Temp src Pulse Resp SpO2   08/11/21 0709 138/89 98 °F (36.7 °C) Oral 74 16 97 %   08/11/21 0300 131/83 97.9 °F (36.6 °C) Oral 77 16 96 %       Intake/Output Summary (Last 24 hours) at 8/11/2021 1017  Last data filed at 8/11/2021 2679  Gross per 24 hour   Intake 370 ml   Output 700 ml   Net -330 ml       Review of Systems  As per Interval hx    Physical Exam  Constitutional:       Comments: Baseline Right side facial droop   HENT:      Head:      Comments: Posterior incision is C/D/I; drain removed  Cardiovascular:      Rate and Rhythm: Normal rate. Rhythm irregular. Pulmonary:      Breath sounds: Normal breath sounds. Abdominal:      Palpations: Abdomen is soft. Skin:     General: Skin is warm. Neurological:      Mental Status: He is alert and oriented to person, place, and time.    Psychiatric:         Mood and Affect: Mood normal.         LABS:    CBC:   Recent Labs     08/09/21  0619 08/10/21  0604   WBC 5.8 5.3   HGB 11.4* 11.5*   HCT 34.8* 35.1*    150   MCV 83.7 83.4 Renal:    Recent Labs     08/09/21  0619 08/10/21  0604 08/11/21  0622    143  --    K 4.4 4.1  --     107  --    CO2 28 28  --    BUN 28* 21*  --    CREATININE 1.2 1.0  --    GLUCOSE 164* 147*  --    CALCIUM 9.1 9.3  --    MG 1.80 1.90 1.80   ANIONGAP 7 8  --      Hepatic:   No results for input(s): AST, ALT, BILITOT, BILIDIR, PROT, LABALBU, ALKPHOS in the last 72 hours. Troponin:   No results for input(s): TROPONINI in the last 72 hours. BNP: No results for input(s): BNP in the last 72 hours. Lipids:   No results for input(s): CHOL, HDL in the last 72 hours. Invalid input(s): LDLCALCU, TRIGLYCERIDE  ABGs:  No results for input(s): PHART, JRH2HVC, PO2ART, TQO9KKR, BEART, THGBART, A1ZTTTDV, NLL9EWC in the last 72 hours. INR:   No results for input(s): INR in the last 72 hours. Lactate: No results for input(s): LACTATE in the last 72 hours. Cultures:  -----------------------------------------------------------------  RAD:   CT HEAD WO CONTRAST   Final Result      1. Significantly decreased size of the right subdural hematoma status post drainage. No new intracranial hemorrhage. 2. Stable advanced atrophy of the brainstem and large area of left cerebellar encephalomalacia      CT Head wo Contrast   Final Result      1. Status post right subdural drain placement with decreased right cerebral convexity subdural hematoma and decreased 2 mm leftward midline shift. CT head without contrast   Final Result      Stable right subdural hematoma with leftward subfalcine herniation. Right-sided ventriculostomy catheter without hydrocephalus. Extensive sinus disease again noted. Assessment/Plan:   Patient is a 76 y/o M s/p craniotomy for subdural evacuation (right side), expose and replace programmable shunt valve (right side) 2/2 stable acute subdural hematoma along the right cerebral convexity with shift.      1. S/P craniotomy for subdural evacuation 2/2 stable right-sided SDH  - neurosurgery on board; the following are their recommendations  - onofre exam q4 hours  - if change in exam or new deficit - call neurosurg and repeat CTH    SDH  - cefazolin x 6 doses post-op, or while drain in place  - Continue drain. Drain open to gravity. Do NOT milk drain  - Incisional Care: Open to air. Wash incision daily with warm soapy water or shower daily, and pat dry with clean dry towel. Paint with CHG swab. - Maintain SBP <160; If PRN med insufficient, then may start Nicardipine infusion  - Keep Plt >100k & INR <1.4  - Hold all full dose anticoagulation & antiplatelet for 2 weeks  - Repeat head CT 8/9/21: Significantly decreased size of the right subdural hematoma status post drainage. No new intracranial hemorrhage; Stable advanced atrophy of the brainstem and large area of left cerebellar encephalomalacia    Cerebral edema:  - Keep Na within normal limits  - Keep HOB >30 degrees  - If central venous access is needed please use subclavian vs femoral - No IJ as this can decrease venous return and worsen cerebral edema    GI Prophylaxis: Pepcid  Seizure prophylaxis: Keppra 1000 mg BID  Bowel Regimen: Senokot-S and Glycolax  Pain control: PRN Roxicodone  Mobility:  - Advance as tolerates  - PT/OT consulted, appreciate recs  Diet: Advance as tolerates  DVT Prophylaxis: SCD's & SQ Heparin    2. Hyperkalemia - resolved   - (8/6-10/21: 5.6 - 5.1 - 4.5 - 4.7- 4.3 - 4.4 - 4.1)    3. HTN  - Keep SBP < 160 per neuro team  - lisinopril restarted on 8/7/21  - labetalol 10 mg PRN   - BP: 121-145/82-91    4. T2DM  - insulin glargine (Lantus)   - insulin lispro   - POC glucose: 123    5. Atrial Fibrillation   - hold Eliquis for at least 2 weeks d/t SDH    D/C plans:   8/11: working on [employer] for [facility]    8/10: working on d/c to [facility] or [facility]    8/9: PT/OT  Recs: Decreased functional mobility; Decreased ADL status;  Decreased endurance; Decreased balance; Decreased fine motor control. 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  Dysarthria is baseline per pt and family report; Continue speech/language therapy to address above goals, 2-4 x/week even on d/c    8/9:Neurosurgery recs: ok to d/c after drain removal     Code Status: Full Code  FEN: Adult diet - regular  PPX: Keppra, Protonix, SCDs and Heparin   DISPO: DEBBIE Pantoja MD, PGY-1  08/11/21  10:17 AM    This patient has been staffed and discussed with Brian Art MD.

## 2021-08-11 NOTE — PROGRESS NOTES
Speech Language Pathology  Facility/Department: [facility] 5T ORTHO/NEURO  Speech / Language / Cognitive Daily Treatment Note  NAME: Brooke Babin  : 1945  MRN: [id number]    Patient Diagnosis(es): has Left knee pain; Tibial plateau fracture, left; DM2 (diabetes mellitus, type 2) ([facility] [address] HTN (hypertension); High cholesterol; Subdural hematoma ([facility] [address] Atrial fibrillation ([facility] [address] and Facial paralysis on left side on their problem list.     Chart reviewed. Pain: None indicated  Medical diagnosis: SDH  Treatment diagnosis: cognitive linguistic impairment    Treatment:  Pt seen to address the following goals:  Goal 1: Pt will demonstrate <2 perseverations for a task with min cues. : Pt consistently exhibiting 3 perseverations for each tx task. Mod cues to extinguish. Poor insight into perseverative responses. Continue goal.  8/10-goal met-  Pt had no instances of perseveration through out this session. Goal 2: Pt will complete graded executive function tasks with adequate attention to detail and self-correction of errors with mod cues. : Functional money math reasoning - verbal problems: 63% accuracy with min cues improving to 88% accuracy with min-mod cues. Identifying items described - providing multiple answers: 90% accuracy with mod cues; limited by perseverations and cues required for increased cognitive flexibility. Continue goal.  8/10- pt was able to describe steps to complete a task but required mod cues for providing more details. Pt was able to state 2-3 uses for a common object with min cues. No cues required for attention. Pt able to respond in a timely manner. :  Pt provided strategies for recall/memory. Pt states he uses a calendar for appointments and pill organizer for meds at home. Pt exhibited no perseveration this date.   Pt solved money/time problems with 90% accuracy, min cues provided  Cont goal    Goal 3: Pt will complete graded recall tasks with 80% accuracy or min cues.  8/8: Dependent to recall ST and skills targeted. Recall of details from verbally presented information - paragraph level: 50% accuracy with min cues. Educated pt to use of visualization and required max fading to mod cues to implement strategy with auditory information. Continue goal.  8/10-pt was able to recall 90% of details following listening to short paragraph of information. Reviewed strategies to aid with memory. 8/11: pt recalled items from prior food tray and information presented at start of session, with no cues. Goal 4: Pt will participate in ongoing cognitive linguistic assessment. 8/8: Goal not targeted. Continue goal.  8/11: pt maintained attention throughout session, asked appropriate questions, and demonstrated good insight into deficits  Cont as appropriate    Education: Educated pt to role of SLP and rationale for tasks presented. Pt stated good understanding    Plan:  Continue speech/language therapy to address above goals, 2-4 x/week x LOS  DC recommendations: ongoing ST indicated  D/W nursing  Needs met prior to leaving room, call button in reach. Treatment time: 15 cog tx  Diony Ford M.S./[facility]-SLP #[id number]  Pg. # [phone]  If patient is discharged prior to next session, this note will serve as discharge summary.

## 2021-08-11 NOTE — PROGRESS NOTES
NEUROSURGERY PROGRESS NOTE    8/11/2021 8:44 AM                               Brooke Babin                      LOS: 6 days   POD#6  s/p Procedure(s) (LRB):  TREPHINE CRANIOTOMY FOR SUBDURAL EVACUATION (Right)  EXPOSE AND REPLACE PROGRAMMABLE SHUNT VALVE (Right)    Subjective: Patient sitting up in bed upon entering the room. No acute events overnight. Patient has no specific complaints this morning. Physical Exam:  Patient seen and examined    Vitals:    08/11/21 0709   BP: 138/89   Pulse: 74   Resp: 16   Temp: 98 °F (36.7 °C)   SpO2: 97%     GCS:  4 - Opens eyes on own  5 - Alert and oriented  6 - Follows simple motor commands  General: Well developed. Alert and cooperative in no acute distress.     HENT: atraumatic, neck supple  Eyes: Optic discs: Not tested  Pulmonary: unlabored respiratory effort  Cardiovascular:  Warm well perfused. No peripheral edema  Gastrointestinal: abdomen soft, NT, ND     Neurological:  Mental Status: Awake, alert, oriented x 4, speech dysarthic  Attention: Intact  Language: Dysarthria (chronic)  Sensation: Intact to all extremities to light touch  Coordination: Intact     Cranial Nerves:  II: Visual acuity not tested, denies new visual changes / diplopia  III, IV, VI: PERRL, 3 mm bilaterally, EOMI, no nystagmus noted  V: Facial sensation intact bilaterally to touch  VII: Left side facial weakness (chronic)  VIII: Hearing intact bilaterally to spoken voice  IX: Palate movement equal bilaterally  XI: Shoulder shrug equal bilaterally  XII: Tongue midline     Musculoskeletal:   Gait: Not tested   Assist devices: None   Tone: Normal  Motor strength:     Right  Left      Right  Left    Deltoid  5 5   Hip Flex  5 5   Biceps  5 5   Knee Extensors  5 5   Triceps  5 5   Knee Flexors  5 5   Wrist Ext  5 5   Ankle Dorsiflex. 5 5   Wrist Flex  5 5   Ankle Plantarflex.   5 5   Handgrip  5 5   Ext Derrick Longus  5 5   Thumb Ext  5 5              Incision: CDI    Radiological Findings:  CT HEAD WO CONTRAST  Result Date: 8/4/2021  Large right subdural hematoma with mixed attenuation suggesting acute on chronic or active bleeding. There is 7 mm right to left midline shift. Remote left cerebellar infarct.     CT Head WO Contrast  Result Date: 8/4/2021  Stable exam. Acute subdural hematoma along the right cerebral convexity measuring up to 17 mm in maximal thickness, stable. Trace acute subdural hematoma along the falx cerebri and the bilateral tentorium, stable. 7 mm right to left midline shift, stable. Stable right parietal approach ventricular catheter. No hydrocephalus. Encephalomalacia in the left cerebellar hemisphere and left middle cerebellar peduncle/left wyatt, stable      CT head without contrast  Result Date: 8/5/2021  Stable right subdural hematoma with leftward subfalcine herniation. Right-sided ventriculostomy catheter without hydrocephalus. Extensive sinus disease again noted. CT Head wo Contrast  Result Date: 8/5/2021  Status post right subdural drain placement with decreased right cerebral convexity subdural hematoma and decreased 2 mm leftward midline shift. CT HEAD WO CONTRAST  Result Date: 8/9/2021  1. Significantly decreased size of the right subdural hematoma status post drainage. No new intracranial hemorrhage. 2. Stable advanced atrophy of the brainstem and large area of left cerebellar encephalomalacia    Labs:  Recent Labs     08/10/21  0604   WBC 5.3   HGB 11.5*   HCT 35.1*          Recent Labs     08/10/21  0604 08/10/21  0604 08/11/21  0622     --   --    K 4.1  --   --      --   --    CO2 28  --   --    BUN 21*  --   --    CREATININE 1.0  --   --    GLUCOSE 147*  --   --    CALCIUM 9.3  --   --    MG 1.90   < > 1.80    < > = values in this interval not displayed. No results for input(s): PROTIME, INR, APTT in the last 72 hours.     Patient Active Problem List    Diagnosis Date Noted    Atrial fibrillation ([facility] [address] 08/10/2021    Facial paralysis on left side 08/10/2021    Subdural hematoma ([facility] [address] 08/04/2021    Tibial plateau fracture, left 04/06/2015    DM2 (diabetes mellitus, type 2) ([facility] [address] 04/06/2015    HTN (hypertension) 04/06/2015    High cholesterol 04/06/2015    Left knee pain 04/10/2014       Assessment:  Patient is a 77 y.o. male s/p Procedure(s) (LRB):  TREPHINE CRANIOTOMY FOR SUBDURAL EVACUATION (Right)  EXPOSE AND REPLACE PROGRAMMABLE SHUNT VALVE (Right) 2/2 stable acute subdural hematoma along the right cerebral convexity with brain compression and midline shift. .     Plan:  1. Neurologically stable  2. Neurologic exams frequency: Q4H  3. For change in exam MUST contact neurosurgery team along with critical care or primary team  4. SDH:  - s/p trephine crani for evacuation of SDH  - Repeat CT Head revealed decreased right cerebral convexity subdural hematoma  - Incisional Care: Open to air. Wash incision daily with warm soapy water or shower daily, and pat dry with clean dry towel. Paint with CHG swab. - Staples can be removed on 8/17/2021  - Maintain SBP <160; If PRN med insufficient, then may start Nicardipine infusion  - Keep Plt >100k & INR <1.4  - Hold all full dose anticoagulation & antiplatelet  - Follow up w/Dr. Clay Kay on 8/24/2021 at 2:15 PM. Patient will have a scheduled CT Head done prior to the appointment. Patient will need to have CT Head and see Dr. Clay Kay prior to starting Eliquis  5. Cerebral edema:  - Keep Na within normal limits  - Keep HOB >30 degrees  - If central venous access is needed please use subclavian vs femoral - No IJ as this can decrease venous return and worsen cerebral edema  6. GI Prophylaxis: Pepcid  7. Seizure prophylaxis: Keppra 1000 mg BID  8. Bowel Regimen: Senokot-S and Glycolax  9. Pain control: PRN Roxicodone  10. Mobility:  - Advance as tolerates  - PT/OT consulted, appreciate recs  11. Diet: Advance as tolerates  12. DVT Prophylaxis: SCD's & SQ Heparin  13.  Appreciate critical care team

## 2021-08-11 NOTE — PROGRESS NOTES
Occupational Therapy  Facility/Department: [facility] 5T ORTHO/NEURO  Daily Treatment Note  NAME: Matthew Wise  : 1945  MRN: [id number]    Date of Service: 2021    Discharge Recommendations:    Matthew Wise scored a 18/24 on the AM-PAC ADL Inpatient form. Current research shows that an AM-PAC score of 17 or less is typically not associated with a discharge to the patient's home setting. Based on the patient's AM-PAC score and their current ADL deficits, it is recommended that the patient have 3-5 sessions per week of Occupational Therapy at d/c to increase the patient's independence. Please see assessment section for further patient specific details. If patient discharges prior to next session this note will serve as a discharge summary. Please see below for the latest assessment towards goals. OT Equipment Recommendations  Other: defer    Assessment   Performance deficits / Impairments: Decreased functional mobility ; Decreased ADL status; Decreased endurance;Decreased balance;Decreased fine motor control  Assessment: Pt continues to demonstrate impulsiveness and requires CGA for functional mobility/transfers and dynamic standing balance. Pt CGA for LB dressing. Pt would benefit from inpt skilled therapy at d/c to maximize functional independence and safety. Continue with POC. Treatment Diagnosis: impaired ADLs/transfers s/p TREPHINE CRANIOTOMY FOR SUBDURAL EVACUATION (Right Head) EXPOSE AND REPLACE PROGRAMMABLE SHUNT VALVE (Right Head). Prognosis: Good  OT Education: OT Role;Plan of Care;ADL Adaptive Strategies;Transfer Training  Patient Education: continued education and reinforcement  Barriers to Learning: cognition/impulse  REQUIRES OT FOLLOW UP: Yes  Activity Tolerance  Activity Tolerance: Patient Tolerated treatment well  Safety Devices  Safety Devices in place: Yes  Type of devices: Nurse notified; Left in chair;Chair alarm in place;Call light within reach; Patient at risk for falls; All fall risk precautions in place;Gait belt         Patient Diagnosis(es): There were no encounter diagnoses. has a past medical history of Arthritis, Brain tumor ([facility] [address] Cancer ([facility] [address] Deaf, Diabetes ([facility] [address] Diabetes mellitus ([facility] [address] Endocarditis, High blood pressure, and Peptic ulcer. has a past surgical history that includes tumor excision; Cardiac surgery; Appendectomy (age 4); Tonsillectomy (age 10); Upper gastrointestinal endoscopy (2013); craniotomy (Right, 8/5/2021); and craniotomy (Right, 8/5/2021). Restrictions  Position Activity Restriction  Other position/activity restrictions: Progressive Amb, HOB elevated 30 degrees  Subjective   General  Chart Reviewed: Yes  Additional Pertinent Hx: 77 y.o. M to ED w/ c/o slurred speech and left side facial droop. Hospital Course: Neurosurgery was consulted and recommended transfer to [facility] for medical management and observation;  CT Head: Large right subdural hematoma with mixed attenuation suggesting acute onchronic or active bleeding; Repeat Head CT: Stable right subdural hematoma with leftward subfalcine herniation; OR 8/5 for TREPHINE CRANIOTOMY FOR SUBDURAL EVACUATION (Right Head) EXPOSE AND REPLACE PROGRAMMABLE SHUNT VALVE (Right Head). PMH: brain tumor, diabetes, DVT, hypertension  Response to previous treatment: Patient with no complaints from previous session  Family / Caregiver Present: No  Referring Practitioner: NITA Sharif  Diagnosis: Subdural Hematoma  Subjective  Subjective: Pt supine in bed upon entry, pleasant and agreeable to therapy session. General Comment  Comments: Pt supine to sit SBA. Pt sit EOB SBA. Pt sit to stand CGA, pt ambualted ~18 ft with rw at CGA to toilet. Pt in stance CGA at toilet to void. Pt doffed brief. Pt ambulated with rw several feet to sink in bathroom. Pt in stance at sink CGA ~5min total for oral care/wash face/wash hands. Pt ambulated with rw at [address] ~18 ft to chair, pt stand to sit CGA.   Pt threaded briefseated on chair and CGA in stance to pull up. Pt doffed/donned  socks with setup/Supervision figure 4. Pt ambulated ~35 ft in room with rw at [facility]. Pt stand to sit CGA, call light in reach and chair alarm on. Vital Signs  Patient Currently in Pain: Denies   Orientation  Orientation  Overall Orientation Status: Within Functional Limits  Objective    ADL  Grooming: Contact guard assistance  LE Dressing: Contact guard assistance;Setup;Verbal cueing  Toileting: Contact guard assistance (in stance to void)        Balance  Standing Balance: Contact guard assistance  Standing Balance  Activity: to/from bathroom, in stance at sink grooming, LB clothing mgmt, in stance to void, ambulation in room  Functional Mobility  Functional - Mobility Device: Rolling Walker  Activity: To/from bathroom; Other  Assist Level: Contact guard assistance  Functional Mobility Comments: slow shuffling gait and vcs to stay inside of rw  Bed mobility  Supine to Sit: Stand by assistance  Transfers  Sit to stand: Contact guard assistance  Stand to sit: Contact guard assistance                       Cognition  Overall Cognitive Status: Exceptions  Arousal/Alertness: Delayed responses to stimuli; Appropriate responses to stimuli  Following Commands: Follows one step commands with repetition; Follows one step commands with increased time  Attention Span: Attends with cues to redirect  Safety Judgement: Decreased awareness of need for assistance;Decreased awareness of need for safety  Insights: Decreased awareness of deficits  Initiation: Requires cues for some  Sequencing: Requires cues for some  Cognition Comment: impulsive at times, increased cues, increased time                                         Plan   Plan  Times per week: 5-7  Times per day: Daily  Current Treatment Recommendations: Balance Training, Functional Mobility Training, Endurance Training, Safety Education & Training, Neuromuscular Re-education, Equipment Evaluation, Education, & procurement, Self-Care / ADL  G-Code     OutComes Score                                                  AM-PAC Score        AM-PAC Inpatient Daily Activity Raw Score: 18 (08/11/21 1433)  AM-PAC Inpatient ADL T-Scale Score : 38.66 (08/11/21 1433)  ADL Inpatient CMS 0-100% Score: 46.65 (08/11/21 1433)  ADL Inpatient CMS G-Code Modifier : CK (08/11/21 1433)    Goals  Short term goals  Time Frame for Short term goals: Discharge  Short term goal 1: toilet transfer w/ CGA- 8/9 cont, goal not addressed 8/10, 8/11  Short term goal 2: LB dressing w/ CGA- 8/9, goal met 8/10  Short term goal 3: grooming activity in standing, CGA- 8/9 cont, CGA 8/10, 8/11  Short term goal 4: bed mobility w/ CGA- 8/8 met, NEW GOAL bed mobility with SPVN - goal not met SBA 8/10, 8/11  Patient Goals   Patient goals : no goal stated       Therapy Time   Individual Concurrent Group Co-treatment   Time In 1341         Time Out 1404         Minutes 23         Timed Code Treatment Minutes: [address] [facility],Third Floor, [address]

## 2021-08-12 LAB
ESTIMATED AVERAGE GLUCOSE: 157.1 MG/DL
HBA1C MFR BLD: 7.1 %

## 2021-08-12 NOTE — OP NOTE
Operative Report    PATIENT NAME: Mark Alves  YOB: 1945  MEDICAL RECORD# [id number]  SURGERY DATE: 8/5/2021  SURGEON:  Kerry Coppola MD, PhD  ASSISTANT:  Sita Nuno PA-C., served as assistant on this surgery due to the complex nature of the surgery and the lack of a resident or fellow assistant. She provided assistance with critical tissue retraction at parts of the surgery, wound closure and assistance with protecting critical neuro elements. DICTATED BY:  Kerry Coppola MD, PhD    PREOPERATIVE DIAGNOSIS(ES):  1. Right Acute/Chronic Subdural Hematoma    POSTOPERATIVE DIAGNOSIS(ES):  1. Right Acute/Chronic Subdural Hematoma    PROCEDURE(S) PERFORMED:  1. Right frontal trephine craniotomy for evacuation of acute/chronic subdural hemorrhage  2. Revision of right ventriculoperitoneal shunt, replacement of valve    SURGEON: Abdirashid Ramirez M.D. ANESTHESIA: General    IMPLANTS:  1. Synthes cranial plating system    INDICATIONS FOR SURGERY:  Mark Alves is a 77 y. o. who was recently diagnosed with acute/chronic subdural hemorrhage. The patient was experiencing left side weakness and ataxia. I recommended drainage of the collection due to his symptoms and significant mass effect resulting in midline shift. The patient was aware of the potential benefits and the possible risks including (but not limited to): infection, bleeding, seizures, wound healing problems, stroke, coma or even death. They wished to proceed with operative intervention. DETAILS OF PROCEDURE: The patient was brought to the operating room, placed on the table in supine position and underwent endotracheal intubation and general anesthesia. The head was placed straight up in a Almodovar horseshoe headrest. The right frontal region and the area over the right shunt catheter and valve were shaved, prepped and draped in the usual sterile fashion.  We made a linear skin incision along the shunt valve at the right post-auricular area. The valve as well as the proximal and distal catheter was exposed. The catheters were disconnected, sluggish flow was noted from the proximal catheter. A new programmable shunt valve was then secured to both the proximal and distal catheters with 0 silk sutures. The wound was irrigated and the deep tissue closed wit 2-0 vicryl. The skin was re-approximated with 4-0 Monocryl and a Dermabond dressing applied. We made an linear skin incision along the right superior temporal line. The pericranium was incised with the Bovie and a small bur hole was made, the dental dissector was used to free the underlying dura. A B1 with the footplate attachment was used to turn a trephine craniotomy and this was removed in a single piece. The 7 flat RICHA catheter was tunneled in the subgaleal plane. The underlying dura was coagulated and opened in a cruciate fashion, releasing dark hemorrhagic stained fluid jesus large acute clot under pressure. A red rubber catheter was passed into the subdural space and it was irrigated copiously until the fluid returned clear. The red catheter was then removed and the RICHA place into the subdural space. The dura was cover with gelfoam and the craniotomy flap was replaced with a Synthes plates and screws. The wound was irrigated copiously with Bacitracin. The galea was closed with 2-0 Vicryl sutures. The skin was closed with a running 4-0 Monocryl and dressed with dermabond. The subdural drain was attached to the Bili drainage bag. The patient awoke in the operating room and was extubated. The patient was brought to the recovery room in good condition with stable vital signs. ESTIMATED BLOOD LOSS: 50 mL. BLOOD TRANSFUSIONS: None    DRAINS: 7 Flat RICHA in the subdural space    In conformance with FA regulations, I affirm that I was present for the entire operation.     Marilee Weller M.D.

## 2021-08-23 ENCOUNTER — HOSPITAL ENCOUNTER (OUTPATIENT)
Dept: CT IMAGING | Age: 76
Discharge: HOME | End: 2021-08-23
Payer: MEDICARE

## 2021-08-23 DIAGNOSIS — S06.4X9A CLOSED FRACTURE OF VAULT OF SKULL WITH SUBARACHNOID, SUBDURAL, AND EXTRADURAL HEMORRHAGE, BRIEF (LESS THAN ONE HOUR) LOSS OF CONSCIOUSNESS (HCC): ICD-10-CM

## 2021-08-23 DIAGNOSIS — S06.6X9A CLOSED FRACTURE OF VAULT OF SKULL WITH SUBARACHNOID, SUBDURAL, AND EXTRADURAL HEMORRHAGE, BRIEF (LESS THAN ONE HOUR) LOSS OF CONSCIOUSNESS (HCC): ICD-10-CM

## 2021-08-23 DIAGNOSIS — S06.5X9A CLOSED FRACTURE OF VAULT OF SKULL WITH SUBARACHNOID, SUBDURAL, AND EXTRADURAL HEMORRHAGE, BRIEF (LESS THAN ONE HOUR) LOSS OF CONSCIOUSNESS (HCC): ICD-10-CM

## 2021-08-23 DIAGNOSIS — S02.0XXA CLOSED FRACTURE OF VAULT OF SKULL WITH SUBARACHNOID, SUBDURAL, AND EXTRADURAL HEMORRHAGE, BRIEF (LESS THAN ONE HOUR) LOSS OF CONSCIOUSNESS (HCC): ICD-10-CM

## 2021-08-23 PROCEDURE — 70450 CT HEAD/BRAIN W/O DYE: CPT

## 2021-09-07 NOTE — CONSULTS
NEUROSURGERY CONSULT NOTE    Sofi Ponce  [id number]   1945     Requesting physician: Neelima Enamorado MD    Reason for consultation: SDH    History of present illness: Patient is a 77 y.o. male that  has a past medical history of Arthritis, Brain tumor ([address] ([address]), Cancer ([address] Deaf, Diabetes ([address] Diabetes mellitus ([address] Endocarditis (late 1990's), High blood pressure, and Peptic ulcer (2013). Patient presented on 8/4/2021 to [facility] ED c/o headache. Patient states he woke up after a nap around 1:45 PM yesterday with a headache. Called EMS and had one seizure prior to EMS arrival. No history of seizures. Not on any antiepileptics. 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  Patient does report that he takes Eliquis. Has not missed any doses. Patient has fallen mechanically several times last one was 3 to 4 days ago without loss of consciousness or any other injury. CT Head showed a \"large R subdural hematoma acute on chronic that was 1.5 cm in thickness w a 7 mm right to left midline shift\". Neurosurgery was consulted and recommended transfer to [facility] for medical management and observation. ROS:   GENERAL:  Denies fever or recent illness.  Denies weight changes   EYES:  Denies vision change or diplopia  EARS:  Denies hearing loss  CARDIAC:  Denies chest pain  RESPIRATORY:  Denies shortness of breath  SKIN:  Denies rash or lesions   HEM:  Denies excessive bruising  PSYCH:  Denies anxiety or depression  NEURO: Endorses dysarthria and left side facial weakness (chronic) and headache; Denies numbness or tingling or lateralizing weakness   :  Denies urinary difficulty  GI: Denies nausea, vomiting, diarrhea or constipation  MUSCULOSKELETAL:  No arthralgias    Allergies   Allergen Reactions    Iv Contrast [Iodides] Other (See Comments)     Severe hypotension    Sulfa Antibiotics Hives       Past Medical History: Diagnosis Date    Arthritis     Brain tumor ([facility] [address] , [address]    surgery to remove tumor left left sided weakness    Cancer ([facility] [address]     prostate    Deaf     left ear from 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    Diabetes ([facility] [address]     Diabetes mellitus ([facility] [address]     Endocarditis late     High blood pressure     Peptic ulcer 2013    stopped aspirin        Past Surgical History:   Procedure Laterality Date    APPENDECTOMY  age 3    CARDIAC SURGERY      repair heart valve surgery at [address] Right 2021    TREPHINE CRANIOTOMY FOR SUBDURAL EVACUATION performed by Endy Zepeda. Daivd Merlin, MD at [address] [facility] Right 2021    EXPOSE AND REPLACE PROGRAMMABLE SHUNT VALVE performed by Endy Zepeda.  Daivd Merlin, MD at [address]  age 10   [address]      Brain    UPPER GASTROINTESTINAL ENDOSCOPY         Social History     Occupational History    Not on file   Tobacco Use    Smoking status: Former Smoker     Years: 2.00     Quit date: 1960     Years since quittin.4    Smokeless tobacco: Never Used   Substance and Sexual Activity    Alcohol use: Yes     Comment: [place]    Drug use: No    Sexual activity: Not Currently        Family History   Problem Relation Age of Onset    Diabetes Mother     Cancer Father         bone    Arthritis Other     Diabetes Other     High Blood Pressure Other         Outpatient Medications Marked as Taking for the 21 encounter [facility] Encounter)   Medication Sig Dispense Refill    levETIRAcetam (KEPPRA) 1000 MG tablet Take 1 tablet by mouth 2 times daily 60 tablet 3    [] sennosides-docusate sodium (SENOKOT-S) 8.6-50 MG tablet Take 2 tablets by mouth 2 times daily for 7 days 28 tablet 0    tamsulosin (FLOMAX) 0.4 MG capsule Take 0.4 mg by mouth nightly      insulin glargine (LANTUS) 100 UNIT/ML injection vial Inject 14 Units into the skin nightly      dicyclomine (BENTYL) 10 MG capsule Take 10 mg by mouth 4 times daily (before meals and nightly)       pravastatin (PRAVACHOL) 80 MG tablet Take 40 mg by mouth nightly       omeprazole (PRILOSEC) 40 MG capsule Take 40 mg by mouth daily. No current facility-administered medications for this encounter. Current Outpatient Medications   Medication Sig Dispense Refill    levETIRAcetam (KEPPRA) 1000 MG tablet Take 1 tablet by mouth 2 times daily 60 tablet 3    tamsulosin (FLOMAX) 0.4 MG capsule Take 0.4 mg by mouth nightly      insulin glargine (LANTUS) 100 UNIT/ML injection vial Inject 14 Units into the skin nightly      dicyclomine (BENTYL) 10 MG capsule Take 10 mg by mouth 4 times daily (before meals and nightly)       pravastatin (PRAVACHOL) 80 MG tablet Take 40 mg by mouth nightly       omeprazole (PRILOSEC) 40 MG capsule Take 40 mg by mouth daily.  carboxymethylcellulose (THERATEARS) 1 % ophthalmic gel Place 1 drop into the left eye 3 times daily as needed for Dry Eyes      furosemide (LASIX) 20 MG tablet Take 20 mg by mouth 4 times daily as needed           Objective:  /72   Pulse 79   Temp 97.4 °F (36.3 °C) (Oral)   Resp 18   Ht 5' 10\" (1.778 m)   Wt 139 lb 1.8 oz (63.1 kg)   SpO2 98%   BMI 19.96 kg/m²     Physical Exam:   Patient seen and examined  GCS:  4 - Opens eyes on own  5 - Alert and oriented  6 - Follows simple motor commands  General: Well developed. Alert and cooperative in no acute distress. HENT: atraumatic, neck supple  Eyes: Optic discs: Not tested  Pulmonary: unlabored respiratory effort  Cardiovascular:  Warm well perfused.  No peripheral edema  Gastrointestinal: abdomen soft, NT, ND    Neurological:  Mental Status: Awake, alert, oriented x 4, speech dysarthic  Attention: Intact  Language: Dysarthria (chronic)  Sensation: Intact to all extremities to light touch  Coordination: Intact    Cranial Nerves:  II: Visual acuity not tested, denies new visual changes / diplopia  III, IV, VI: PERRL, 3 mm bilaterally, EOMI, no questions or decline in neurological status    DISPO: Remain inpatient from neurosurgery standpoint. Dispo timing to be determined by primary team once patient is medically stable for discharge.     Chuy Irene MD, PhD  Stefanie Castellano  Director, [address], [address] ([address]), [phone] (o)

## 2022-01-24 RX ORDER — LISINOPRIL 10 MG/1
10 TABLET ORAL DAILY
COMMUNITY

## 2022-01-24 RX ORDER — ACETAMINOPHEN 325 MG/1
650 TABLET ORAL EVERY 6 HOURS PRN
COMMUNITY

## 2022-01-24 NOTE — PROGRESS NOTES
C-Difficile admission screening and protocol:     * Admitted with diarrhea? no     *Prior history of C-Diff. In last 3 months? no     *Antibiotic use in the past 6-8 weeks? Yes eye ointment      *Prior hospitalization or nursing home in the last month?    no

## 2022-01-24 NOTE — PROGRESS NOTES
Preoperative Screening for Elective Surgery/Invasive Procedures While COVID-19 present in the community     Have you tested positive or have been told to self-isolate for COVID-19 like symptoms within the past 28 days?  Do you currently have any of the following symptoms? o Fever >100.0 F or 99.9 F in immunocompromised patients? o New onset cough, shortness of breath or difficulty breathing?  o New onset sore throat, myalgia (muscle aches and pains), headache, loss of taste/smell or diarrhea?  Have you had a potential exposure to COVID-19 within the past 14 days by:  o Close contact with a confirmed case? o Close contact with a healthcare worker,  or essential infrastructure worker (grocery store, [employer], gas station, public utilities or transportation)? Yes md offices   o Do you reside in a congregate setting such as; skilled nursing facility, adult home, correctional facility, homeless shelter or other institutional setting?  o Have you had recent travel to a known COVID-19 hotspot?  o   o  no to rest above     Indicate if the patient has a positive screen by answering yes to one or more of the above questions. Patients who test positive or screen positive prior to surgery or on the day of surgery should be evaluated in conjunction with the surgeon/proceduralist/anesthesiologist to determine the urgency of the procedure.       vaccines x 3

## 2022-01-28 ENCOUNTER — ANESTHESIA EVENT (OUTPATIENT)
Dept: SURGERY | Age: 77
End: 2022-01-28
Payer: OTHER GOVERNMENT

## 2022-01-28 RX ORDER — DORZOLAMIDE HYDROCHLORIDE AND TIMOLOL MALEATE 20; 5 MG/ML; MG/ML
1 SOLUTION/ DROPS OPHTHALMIC 2 TIMES DAILY
COMMUNITY

## 2022-01-28 RX ORDER — GATIFLOXACIN 5 MG/ML
1 SOLUTION/ DROPS OPHTHALMIC
Status: ON HOLD | COMMUNITY
End: 2022-03-17 | Stop reason: ALTCHOICE

## 2022-01-28 NOTE — PRE-PROCEDURE INSTRUCTIONS
[address] [facility]  [phone]        Pre-Op Phone Call:     Patient Name: Jennifer Bryan     Telephone Information:   Mobile [phone]     Home phone:  [phone]    Surgery Time:    9:00 AM     Arrival Time:  7:30am      Left extended Message:  No     Message left with:     Recent change in health status:  No     Advised of transportation/ policy:  Yes     NPO policy reviewed: Yes     Advised to take morning heart/blood pressure medications with sips of water morning of surgery? Yes     Instructed to bring eye drops, photo identification, and insurance card day of surgery? Yes     Advised to wear short sleeved button down shirt (no T-shirt underneath):  Yes     Advised not to wear jewelry, hairpins, or pantyhose day of surgery? Yes     Advised not to wear make-up and to wash face day of surgery?   Yes    Remarks:        Electronically signed by:  Catherine Tello RN at 1/28/2022 10:42 AM

## 2022-01-31 ENCOUNTER — ANESTHESIA (OUTPATIENT)
Dept: SURGERY | Age: 77
End: 2022-01-31
Payer: OTHER GOVERNMENT

## 2022-01-31 ENCOUNTER — HOSPITAL ENCOUNTER (OUTPATIENT)
Age: 77
Setting detail: OUTPATIENT SURGERY
Discharge: HOME | End: 2022-01-31
Attending: OPHTHALMOLOGY | Admitting: OPHTHALMOLOGY
Payer: OTHER GOVERNMENT

## 2022-01-31 VITALS
RESPIRATION RATE: 15 BRPM | DIASTOLIC BLOOD PRESSURE: 73 MMHG | OXYGEN SATURATION: 100 % | SYSTOLIC BLOOD PRESSURE: 115 MMHG

## 2022-01-31 VITALS
DIASTOLIC BLOOD PRESSURE: 73 MMHG | SYSTOLIC BLOOD PRESSURE: 125 MMHG | OXYGEN SATURATION: 98 % | TEMPERATURE: 96.4 F | WEIGHT: 205 LBS | BODY MASS INDEX: 28.7 KG/M2 | HEART RATE: 72 BPM | HEIGHT: 71 IN | RESPIRATION RATE: 14 BRPM

## 2022-01-31 LAB
GLUCOSE BLD-MCNC: 101 MG/DL (ref 70–99)
GLUCOSE BLD-MCNC: 109 MG/DL (ref 70–99)
PERFORMED ON: ABNORMAL
PERFORMED ON: ABNORMAL

## 2022-01-31 PROCEDURE — 3600000002 HC SURGERY LEVEL 2 BASE: Performed by: OPHTHALMOLOGY

## 2022-01-31 PROCEDURE — 2500000003 HC RX 250 WO HCPCS: Performed by: NURSE ANESTHETIST, CERTIFIED REGISTERED

## 2022-01-31 PROCEDURE — 3600000015 HC SURGERY LEVEL 5 ADDTL 15MIN: Performed by: OPHTHALMOLOGY

## 2022-01-31 PROCEDURE — 7100000010 HC PHASE II RECOVERY - FIRST 15 MIN: Performed by: OPHTHALMOLOGY

## 2022-01-31 PROCEDURE — 7100000011 HC PHASE II RECOVERY - ADDTL 15 MIN: Performed by: OPHTHALMOLOGY

## 2022-01-31 PROCEDURE — 2500000003 HC RX 250 WO HCPCS: Performed by: OPHTHALMOLOGY

## 2022-01-31 PROCEDURE — 6370000000 HC RX 637 (ALT 250 FOR IP): Performed by: OPHTHALMOLOGY

## 2022-01-31 PROCEDURE — 6360000002 HC RX W HCPCS: Performed by: NURSE ANESTHETIST, CERTIFIED REGISTERED

## 2022-01-31 PROCEDURE — 3600000005 HC SURGERY LEVEL 5 BASE: Performed by: OPHTHALMOLOGY

## 2022-01-31 PROCEDURE — 3600000012 HC SURGERY LEVEL 2 ADDTL 15MIN: Performed by: OPHTHALMOLOGY

## 2022-01-31 PROCEDURE — 3700000001 HC ADD 15 MINUTES (ANESTHESIA): Performed by: OPHTHALMOLOGY

## 2022-01-31 PROCEDURE — 2580000003 HC RX 258: Performed by: ANESTHESIOLOGY

## 2022-01-31 PROCEDURE — 2709999900 HC NON-CHARGEABLE SUPPLY: Performed by: OPHTHALMOLOGY

## 2022-01-31 PROCEDURE — 3700000000 HC ANESTHESIA ATTENDED CARE: Performed by: OPHTHALMOLOGY

## 2022-01-31 RX ORDER — SODIUM CHLORIDE 9 MG/ML
INJECTION, SOLUTION INTRAVENOUS CONTINUOUS
Status: DISCONTINUED | OUTPATIENT
Start: 2022-01-31 | End: 2022-01-31 | Stop reason: HOSPADM

## 2022-01-31 RX ORDER — SODIUM CHLORIDE 9 MG/ML
25 INJECTION, SOLUTION INTRAVENOUS PRN
Status: DISCONTINUED | OUTPATIENT
Start: 2022-01-31 | End: 2022-01-31 | Stop reason: HOSPADM

## 2022-01-31 RX ORDER — TETRACAINE HYDROCHLORIDE 5 MG/ML
SOLUTION OPHTHALMIC
Status: COMPLETED | OUTPATIENT
Start: 2022-01-31 | End: 2022-01-31

## 2022-01-31 RX ORDER — SODIUM CHLORIDE 0.9 % (FLUSH) 0.9 %
10 SYRINGE (ML) INJECTION EVERY 12 HOURS SCHEDULED
Status: DISCONTINUED | OUTPATIENT
Start: 2022-01-31 | End: 2022-01-31 | Stop reason: HOSPADM

## 2022-01-31 RX ORDER — MIDAZOLAM HYDROCHLORIDE 1 MG/ML
INJECTION INTRAMUSCULAR; INTRAVENOUS PRN
Status: DISCONTINUED | OUTPATIENT
Start: 2022-01-31 | End: 2022-01-31 | Stop reason: SDUPTHER

## 2022-01-31 RX ORDER — PROPOFOL 10 MG/ML
INJECTION, EMULSION INTRAVENOUS PRN
Status: DISCONTINUED | OUTPATIENT
Start: 2022-01-31 | End: 2022-01-31 | Stop reason: SDUPTHER

## 2022-01-31 RX ORDER — LIDOCAINE HYDROCHLORIDE 20 MG/ML
INJECTION, SOLUTION EPIDURAL; INFILTRATION; INTRACAUDAL; PERINEURAL PRN
Status: DISCONTINUED | OUTPATIENT
Start: 2022-01-31 | End: 2022-01-31 | Stop reason: SDUPTHER

## 2022-01-31 RX ORDER — SODIUM CHLORIDE 0.9 % (FLUSH) 0.9 %
10 SYRINGE (ML) INJECTION PRN
Status: DISCONTINUED | OUTPATIENT
Start: 2022-01-31 | End: 2022-01-31 | Stop reason: HOSPADM

## 2022-01-31 RX ORDER — FENTANYL CITRATE 50 UG/ML
INJECTION, SOLUTION INTRAMUSCULAR; INTRAVENOUS PRN
Status: DISCONTINUED | OUTPATIENT
Start: 2022-01-31 | End: 2022-01-31 | Stop reason: SDUPTHER

## 2022-01-31 RX ADMIN — LIDOCAINE HYDROCHLORIDE 40 MG: 20 INJECTION, SOLUTION EPIDURAL; INFILTRATION; INTRACAUDAL; PERINEURAL at 08:56

## 2022-01-31 RX ADMIN — MIDAZOLAM 1 MG: 1 INJECTION INTRAMUSCULAR; INTRAVENOUS at 08:56

## 2022-01-31 RX ADMIN — SODIUM CHLORIDE: 9 INJECTION, SOLUTION INTRAVENOUS at 07:45

## 2022-01-31 RX ADMIN — FENTANYL CITRATE 50 MCG: 50 INJECTION INTRAMUSCULAR; INTRAVENOUS at 08:56

## 2022-01-31 RX ADMIN — PROPOFOL 20 MG: 10 INJECTION, EMULSION INTRAVENOUS at 08:56

## 2022-01-31 ASSESSMENT — PAIN SCALES - GENERAL
PAINLEVEL_OUTOF10: 0
PAINLEVEL_OUTOF10: 0

## 2022-01-31 ASSESSMENT — PULMONARY FUNCTION TESTS
PIF_VALUE: 1

## 2022-01-31 ASSESSMENT — PAIN - FUNCTIONAL ASSESSMENT: PAIN_FUNCTIONAL_ASSESSMENT: 0-10

## 2022-01-31 ASSESSMENT — LIFESTYLE VARIABLES: SMOKING_STATUS: 0

## 2022-01-31 NOTE — H&P
Date of Surgery Update:  Halle Navarro was seen, history and physical examination reviewed, and patient examined by me today. There have been no significant clinical changes since the completion of the previous history and physical. The surgical site was confirmed by the patient and me. The risk, benefits, and alternatives of the proposed procedure have been explained to the patient (or appropriate guardian) and understanding verbalized. All questions answered. Patient wishes to proceed.     Electronically signed by: Nahum Whipple MD,1/31/2022,8:59 AM

## 2022-01-31 NOTE — ANESTHESIA PRE PROCEDURE
Department of Anesthesiology  Preprocedure Note       Name:  Lotus Whittaker   Age:  77 y.o.  :  1945                                          MRN:  [id number]         Date:  2022      Surgeon: Fiona Toribio):  Samantha Holland MD    Procedure: Procedure(s):  LEFT LOWER LID ECTROPION REPAIR WITH CANTHOPLASTY - LEFT EYE  TARSORRHAPHY - LEFT EYE    Medications prior to admission:   Prior to Admission medications    Medication Sig Start Date End Date Taking?  Authorizing Provider   gatifloxacin (ZYMAR) 0.5 % SOLN 1 drop every 2 hours (while awake)   Yes Historical Provider, MD   dorzolamide-timolol (COSOPT) 22.3-6.8 MG/ML ophthalmic solution 1 drop 2 times daily   Yes Historical Provider, MD   lisinopril (PRINIVIL;ZESTRIL) 5 MG tablet Take 5 mg by mouth daily   Yes Historical Provider, MD   acetaminophen (TYLENOL) 325 MG tablet Take 650 mg by mouth every 6 hours as needed for Pain   Yes Historical Provider, MD   tamsulosin (FLOMAX) 0.4 MG capsule Take 0.4 mg by mouth nightly 21  Yes Historical Provider, MD   insulin glargine (LANTUS) 100 UNIT/ML injection vial Inject 14 Units into the skin nightly   Yes Historical Provider, MD   carboxymethylcellulose (THERATEARS) 1 % ophthalmic gel Place 1 drop into the left eye 3 times daily as needed for Dry Eyes 21  Yes Historical Provider, MD   dicyclomine (BENTYL) 10 MG capsule Take 10 mg by mouth 4 times daily (before meals and nightly)    Yes Historical Provider, MD   furosemide (LASIX) 20 MG tablet Take 20 mg by mouth daily    Yes Historical Provider, MD   pravastatin (PRAVACHOL) 80 MG tablet Take 20 mg by mouth nightly    Yes Historical Provider, MD       Current medications:    Current Facility-Administered Medications   Medication Dose Route Frequency Provider Last Rate Last Admin    0.9 % sodium chloride infusion   IntraVENous Continuous Socorro Cowart  mL/hr at 22 0745 New Bag at 22 0745    sodium chloride flush 0.9 % injection 10 mL  10 mL IntraVENous 2 times per day Yusuf Patel MD        sodium chloride flush 0.9 % injection 10 mL  10 mL IntraVENous PRN Yusuf Patel MD        0.9 % sodium chloride infusion  25 mL IntraVENous PRN Yusuf Patel MD           Allergies: Allergies   Allergen Reactions    Iv Contrast [Iodides] Other (See Comments)     Severe hypotension    Sulfa Antibiotics Hives       Problem List:    Patient Active Problem List   Diagnosis Code    Left knee pain M25.562    Tibial plateau fracture, left S82.142A    DM2 (diabetes mellitus, type 2) ([facility]) E11.9    HTN (hypertension) I10    High cholesterol E78.00    Subdural hematoma ([facility]) S06.5X9A    Atrial fibrillation ([facility]) I48.91    Facial paralysis on left side G51.0       Past Medical History:        Diagnosis Date    Arthritis     Atrial fibrillation ([address]     Brain bleed ([address]     from a fall     Brain tumor ([address] 1983, 1995    surgery to remove tumor left left sided weakness    Cancer ([address]     prostate    Cerebral artery occlusion with cerebral infarction ([address]     related to an intracranial hemmorrhage after a fall     CHF (congestive heart failure) ([facility])     Chronic renal disease     stage 2    Deaf     left ear from 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    Diabetes ([address]     Diabetes mellitus ([address]     Endocarditis late 1990's    High blood pressure     History of blood transfusion     Hyperlipidemia     Left-sided weakness     Peptic ulcer 2013    stopped aspirin    Sick sinus syndrome ([facility])     Slurred speech        Past Surgical History:        Procedure Laterality Date    APPENDECTOMY  age 4   [de-identified] LIFT      browplasty    CARDIAC SURGERY  2012    repair heart valve surgery at [facility] Mitral valve    CATARACT REMOVAL WITH IMPLANT Bilateral     CRANIOTOMY Right 8/5/2021    TREPHINE CRANIOTOMY FOR SUBDURAL EVACUATION performed by Jignesh Simpson.  Chris Odom MD at [address] Right 8/5/2021    EXPOSE AND REPLACE PROGRAMMABLE SHUNT VALVE performed by Ijeoma Thompson. Fly Lara MD at [address]  age 10   [address]      Brain    UPPER GASTROINTESTINAL ENDOSCOPY         Social History:    Social History     Tobacco Use    Smoking status: Former Smoker     Years: 2.00     Types: Cigarettes     Quit date: 1960     Years since quittin.8    Smokeless tobacco: Never Used   Substance Use Topics    Alcohol use: Yes     Comment: [place]                                Counseling given: Not Answered      Vital Signs (Current):   Vitals:    22 1549 22 0725   BP:  (!) 138/94   Pulse:  93   Resp:  18   Temp:  97.8 °F (36.6 °C)   TempSrc:  Temporal   SpO2:  98%   Weight: 205 lb (93 kg) 205 lb (93 kg)   Height: 5' 11\" (1.803 m) 5' 11\" (1.803 m)                                              BP Readings from Last 3 Encounters:   22 (!) 138/94   21 107/72   21 127/66       NPO Status: Time of last liquid consumption: 530                        Time of last solid consumption:                         Date of last liquid consumption: 22                        Date of last solid food consumption: 22    BMI:   Wt Readings from Last 3 Encounters:   22 205 lb (93 kg)   21 139 lb 1.8 oz (63.1 kg)   21 211 lb 13.8 oz (96.1 kg)     Body mass index is 28.59 kg/m².     CBC:   Lab Results   Component Value Date    WBC 5.3 08/10/2021    RBC 4.21 08/10/2021    HGB 11.5 08/10/2021    HCT 35.1 08/10/2021    MCV 83.4 08/10/2021    RDW 14.5 08/10/2021     08/10/2021       CMP:   Lab Results   Component Value Date     08/10/2021    K 4.1 08/10/2021    K 4.6 2021     08/10/2021    CO2 28 08/10/2021    BUN 21 08/10/2021    CREATININE 1.0 08/10/2021    GFRAA >60 08/10/2021    AGRATIO 1.2 2021    LABGLOM >60 08/10/2021    GLUCOSE 147 08/10/2021    PROT 6.6 2021    CALCIUM 9.3 08/10/2021    BILITOT 0.6 2021    ALKPHOS 128 2021    AST 15 2021    ALT 12 08/04/2021       POC Tests:   Recent Labs     01/31/22  0745   POCGLU 109*       Coags:   Lab Results   Component Value Date    PROTIME 14.0 08/06/2021    INR 1.23 08/06/2021    APTT 34.1 08/06/2021       HCG (If Applicable): No results found for: PREGTESTUR, PREGSERUM, HCG, HCGQUANT     ABGs: No results found for: PHART, PO2ART, PZV5OGJ, ETY1QQA, BEART, U8HTSCDT     Type & Screen (If Applicable):  Lab Results   Component Value Date    LABABO B 01/08/2013    [address] Positive 01/08/2013       Drug/Infectious Status (If Applicable):  No results found for: HIV, HEPCAB    COVID-19 Screening (If Applicable): No results found for: COVID19        Anesthesia Evaluation   no history of anesthetic complications:   Airway: Mallampati: II  TM distance: >3 FB   Neck ROM: full  Mouth opening: > = 3 FB Dental:      Comment: Poor dentition, multiple chipped teeth; no loose teeth per patient and his wife    Pulmonary: breath sounds clear to auscultation      (-) COPD, asthma, rhonchi, wheezes, rales and not a current smoker (s/p remote cessation)                           Cardiovascular:  Exercise tolerance: no interval change, Ambulates with walker; uses wheelchair  (+) hypertension:, dysrhythmias: atrial fibrillation, CHF: diastolic, peripheral edema, hyperlipidemia    (-) past MI and weak pulses        Rate: normal                 ROS comment: Sick sinus syndrome   Chronic AF  s/p MVR    Echocardiogram (2019):  Summary:   The left ventricular wall motion is normal. Mild aortic regurgitation. Mild tricuspid regurgitation is present. The left atrium is dilated. There is marked mitral annular calcification present, which causes restriction of mitral outflow. Injection of agitated saline showed no interatrial shunt. Overall left ventricular ejection fraction is estimated to be 55-60%. There is mild concentric left ventricular hypertrophy.         Neuro/Psych:   (+) seizures:, CVA (left facial paralysis):,              ROS comment: h/o intracranial hemorrhage following fall  Brain tumor s/p shunt x3 GI/Hepatic/Renal:   (+) PUD,      (-) liver disease, no renal disease and no morbid obesity       Endo/Other:    (+) Diabetes (HgbA1c: 7.1%)Type II DM, using insulin, blood dyscrasia (mild last H&H: 11.5/35. 1): anemia:., malignancy/cancer (brain tumor). Abdominal:   (+) obese,     Abdomen: soft. Vascular: Other Findings: Left sided paralysis, facial asymmetry, tongue deviates to left            Anesthesia Plan      MAC     ASA 3     (NPO appropriate; patient denies active nausea / reflux.)  Induction: intravenous. Anesthetic plan and risks discussed with patient and spouse. Plan discussed with CRNA. This pre-anesthesia assessment may be used as a history and physical.    DOS STAFF ADDENDUM:    Pt seen and examined, chart reviewed (including anesthesia, drug and allergy history). No interval changes to history and physical examination. Anesthetic plan, risks, benefits, alternatives, and personnel involved discussed with patient. Patient verbalized an understanding and agrees to proceed.       Mariajose Hill MD  January 31, 2022  8:17 AM

## 2022-01-31 NOTE — BRIEF OP NOTE
Brief Postoperative Note      Patient: Alda Sarah  YOB: 1945  MRN: [id number]    Date of Procedure: 1/31/2022    Pre-Op Diagnosis: Ectropion and lagophthalmos, paralytic, of left lower eyelid,    Post-Op Diagnosis: Same       Procedure(s):  LEFT LOWER LID ECTROPION REPAIR WITH CANTHOPLASTY - LEFT EYE  TARSORRHAPHY - LEFT EYE    Surgeon(s):  Yudy Pendleton MD    Assistant:  * No surgical staff found *    Anesthesia: Monitor Anesthesia Care    Estimated Blood Loss (mL): Minimal    Complications: None    Specimens:   * No specimens in log *    Implants:  * No implants in log *      Drains: * No LDAs found *    Findings: LLL Paralytic ectropion and lagophthalmos    Electronically signed by Yudy Pendleton MD on 1/31/2022 at 9:26 AM

## 2022-01-31 NOTE — ANESTHESIA POSTPROCEDURE EVALUATION
Department of Anesthesiology  Postprocedure Note    Patient: Elaine Hoover  MRN: [id number]  YOB: 1945  Date of evaluation: 1/31/2022  Time:  9:31 AM     Procedure Summary     Date: 01/31/22 Room / Location: [address]    Anesthesia Start: 4272 Anesthesia Stop:     Procedures:       LEFT LOWER LID ECTROPION REPAIR WITH CANTHOPLASTY - LEFT EYE (Left )      TARSORRHAPHY - LEFT EYE (Left ) Diagnosis:       Ectropion of left lower eyelid, unspecified ectropion type      (Ectropion of left lower eyelid,)    Surgeons: Parish Campa MD Responsible Provider: Devendra Thornton MD    Anesthesia Type: MAC ASA Status: 3          Anesthesia Type: No value filed. Jayna Phase I: Jayna Score: 10    Jayna Phase II: Jayna Score: 10    Last vitals: Reviewed and per EMR flowsheets. Anesthesia Post Evaluation    Patient location during evaluation: PACU  Patient participation: complete - patient participated  Level of consciousness: awake and alert  Airway patency: patent  Nausea & Vomiting: no nausea and no vomiting  Complications: no  Cardiovascular status: hemodynamically stable and blood pressure returned to baseline  Respiratory status: spontaneous ventilation, nonlabored ventilation and room air  Hydration status: stable  Comments: Mr. Narciso Thornton resting comfortably following procedure. Appropriate for discharge home with , who is present at bedside.       Devendra Thornton MD

## 2022-02-04 NOTE — OP NOTE
Title of Operation:  Left lower lid severing of tarsorraphy, ectropion repair, extensive, new permanent lateral tarsorraphy, and medial canthoplasty. 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 The risks, alternatives, and benefits of surgical repair were discussed and the patient elected to proceed. Preoperative Diagnosis:  Left lower lid paralytic ectropion  Left lower lid paralytic lagophthalmos  Exposure keratopathy, left eye  Postoperative Diagnosis:  Same  Anesthesia:  Monitored anesthesia care (MAC) and local  Surgeon:   Nirmala Jones MD  Specimen (Bacteriological, Pathological or other):  None  Prosthetic Device/Implant:  None  Estimated blood loss:  Less than 5mL  Surgeon's Narrative: The patient was greeted in the preoperative area, where the correct site was identified and marked. The patient was brought into the operating room and placed under monitored anesthesia care. A time-out for safety was held. Local anesthetic was injected in the left canthal areas and lower eyelid. The patient was then prepped and draped in the usual sterile fashion. First, an existing tarsorraphy was severed with scissors in order to access the lateral canthus. A lateral canthotomy and inferior cantholysis were performed with a #15 blade and scissors. The periosteum over the lateral orbital rim was then dissected and exposed. A 3mm lateral tarsal strip was fashioned in the standard way. Careful hemostasis was achieved with bipolar cautery. A double-armed 4/0 Polydek suture was then passed through the tarsal strip and then through the exposed periosteum. The 4/0 Polydek suture was tied and trimmed. Attention was directed to the medial lower lid, where the ectropion was more prominent.  A medial spindle was performed in the usual fashion with Franklin scissors and a 6-0 vycril suture. This helped to reposition the everted medial id margin. The skin and orbicularis were closed with two interrupted 5/0 fast-absorbing gut suture. The patient tolerated the procedure very well. There were no complications.

## 2022-02-04 NOTE — OP NOTE
Title of Operation:  Left lower lid severing of tarsorraphy, ectropion repair, extensive, new lateral tarsorraphy, and medial canthoplasty. Indications for Surgery:  77year-old male with paralytic ectropion and lagophthalmos of the left lowe lid, causing exposure keratopathy. Surgical repair is medically necessary. The risks, alternatives, and benefits of the surgery were discussed and the patient elected to proceed. 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 The patient was brought into the operating room and placed under monitored anesthesia care. A time-out for safety was held. Local anesthetic was injected in the left canthal areas and lower eyelid. The patient was then prepped and draped in the usual sterile fashion. First, an existing tarsorraphy was severed with scissors in order to access the lateral canthus. A lateral canthotomy and inferior cantholysis were performed with a #15 blade and scissors. The periosteum over the lateral orbital rim was then dissected and exposed. A 3mm lateral tarsal strip was fashioned in the standard way. Careful hemostasis was achieved with bipolar cautery. A double-armed 4/0 Polydek suture was passed through the tarsal strip and then through the exposed periosteum. The 4/0 Polydek suture was tied and trimmed. Next, 5mm of the lateral lid margin were de-epithelialized with a #11 blade. A permanent tarsorraphy was fashioned with a 6-0 vycril mattress suture to create intermarginal adhesions.  Then, attention was directed to the medial canthus. The lower punctum was dilated. A 00-probe was placed in the lower canaliculus. A #11 blade was used to make a skin incision parallel to the canaliculus. Dissection was carried out through orbicularis muscle. An identical procedure was performed on the medial upper lid. Then, a medial canthoplasty was fashioned by suturing the superior orbicularis to the corresponding inferior orbicularis, over the medial canthal tendon, with deep 6-0 vycril sutures. The skin was closed with interrupted 6-0 plain gut sutures. Erythromycin ointment was placed on the incisions and the eye. The patient tolerated the procedure very well. There were no complications.

## 2022-03-16 ENCOUNTER — HOSPITAL ENCOUNTER (INPATIENT)
Age: 77
LOS: 1 days | Discharge: HOME | DRG: 261 | End: 2022-03-17
Attending: EMERGENCY MEDICINE | Admitting: STUDENT IN AN ORGANIZED HEALTH CARE EDUCATION/TRAINING PROGRAM
Payer: OTHER GOVERNMENT

## 2022-03-16 ENCOUNTER — APPOINTMENT (OUTPATIENT)
Dept: GENERAL RADIOLOGY | Age: 77
DRG: 261 | End: 2022-03-16
Payer: OTHER GOVERNMENT

## 2022-03-16 ENCOUNTER — APPOINTMENT (OUTPATIENT)
Dept: CT IMAGING | Age: 77
DRG: 261 | End: 2022-03-16
Payer: OTHER GOVERNMENT

## 2022-03-16 DIAGNOSIS — R55 SYNCOPE AND COLLAPSE: ICD-10-CM

## 2022-03-16 DIAGNOSIS — R00.1 BRADYCARDIA WITH LESS THAN 30 BEATS PER MINUTE: ICD-10-CM

## 2022-03-16 DIAGNOSIS — R77.8 ELEVATED TROPONIN: Primary | ICD-10-CM

## 2022-03-16 PROBLEM — R41.82 AMS (ALTERED MENTAL STATUS): Status: ACTIVE | Noted: 2022-03-16

## 2022-03-16 PROBLEM — R79.89 ELEVATED TROPONIN: Status: ACTIVE | Noted: 2022-03-16

## 2022-03-16 LAB
A/G RATIO: 2.1 (ref 1.1–2.2)
ALBUMIN SERPL-MCNC: 4.1 G/DL (ref 3.4–5)
ALP BLD-CCNC: 83 U/L (ref 40–129)
ALT SERPL-CCNC: 14 U/L (ref 10–40)
ANION GAP SERPL CALCULATED.3IONS-SCNC: 12 MMOL/L (ref 3–16)
AST SERPL-CCNC: 18 U/L (ref 15–37)
BASE EXCESS VENOUS: 3.3 MMOL/L
BASOPHILS ABSOLUTE: 0 K/UL (ref 0–0.2)
BASOPHILS RELATIVE PERCENT: 0.8 %
BILIRUB SERPL-MCNC: 0.3 MG/DL (ref 0–1)
BILIRUBIN URINE: NEGATIVE
BLOOD, URINE: NEGATIVE
BUN BLDV-MCNC: 27 MG/DL (ref 7–20)
CALCIUM SERPL-MCNC: 8.8 MG/DL (ref 8.3–10.6)
CARBOXYHEMOGLOBIN: 1.1 %
CHLORIDE BLD-SCNC: 102 MMOL/L (ref 99–110)
CLARITY: CLEAR
CO2: 25 MMOL/L (ref 21–32)
COLOR: YELLOW
CREAT SERPL-MCNC: 1.3 MG/DL (ref 0.8–1.3)
EOSINOPHILS ABSOLUTE: 0.2 K/UL (ref 0–0.6)
EOSINOPHILS RELATIVE PERCENT: 3.2 %
EPITHELIAL CELLS, UA: 1 /HPF (ref 0–5)
GFR AFRICAN AMERICAN: >60
GFR NON-AFRICAN AMERICAN: 54
GLUCOSE BLD-MCNC: 256 MG/DL (ref 70–99)
GLUCOSE URINE: NEGATIVE MG/DL
HCO3 VENOUS: 31 MMOL/L (ref 23–29)
HCT VFR BLD CALC: 39.3 % (ref 40.5–52.5)
HEMOGLOBIN: 12.8 G/DL (ref 13.5–17.5)
HYALINE CASTS: 3 /LPF (ref 0–8)
KETONES, URINE: NEGATIVE MG/DL
LACTIC ACID: 2.1 MMOL/L (ref 0.4–2)
LEUKOCYTE ESTERASE, URINE: NEGATIVE
LYMPHOCYTES ABSOLUTE: 1.5 K/UL (ref 1–5.1)
LYMPHOCYTES RELATIVE PERCENT: 29.7 %
MAGNESIUM: 2.2 MG/DL (ref 1.8–2.4)
MCH RBC QN AUTO: 28 PG (ref 26–34)
MCHC RBC AUTO-ENTMCNC: 32.6 G/DL (ref 31–36)
MCV RBC AUTO: 86 FL (ref 80–100)
METHEMOGLOBIN VENOUS: 0.6 %
MICROSCOPIC EXAMINATION: YES
MONOCYTES ABSOLUTE: 0.4 K/UL (ref 0–1.3)
MONOCYTES RELATIVE PERCENT: 7.4 %
NEUTROPHILS ABSOLUTE: 2.9 K/UL (ref 1.7–7.7)
NEUTROPHILS RELATIVE PERCENT: 58.9 %
NITRITE, URINE: NEGATIVE
O2 SAT, VEN: 35 %
O2 THERAPY: ABNORMAL
PCO2, VEN: 60.4 MMHG (ref 40–50)
PDW BLD-RTO: 13.8 % (ref 12.4–15.4)
PH UA: 6 (ref 5–8)
PH VENOUS: 7.32 (ref 7.35–7.45)
PLATELET # BLD: 140 K/UL (ref 135–450)
PMV BLD AUTO: 8.9 FL (ref 5–10.5)
PO2, VEN: <30 MMHG
POTASSIUM REFLEX MAGNESIUM: 4.5 MMOL/L (ref 3.5–5.1)
PROTEIN UA: 30 MG/DL
RBC # BLD: 4.57 M/UL (ref 4.2–5.9)
RBC UA: 1 /HPF (ref 0–4)
SODIUM BLD-SCNC: 139 MMOL/L (ref 136–145)
SPECIFIC GRAVITY UA: 1.01 (ref 1–1.03)
TCO2 CALC VENOUS: 33 MMOL/L
TOTAL PROTEIN: 6.1 G/DL (ref 6.4–8.2)
TROPONIN: 0.03 NG/ML
URINE REFLEX TO CULTURE: ABNORMAL
URINE TYPE: ABNORMAL
UROBILINOGEN, URINE: 0.2 E.U./DL
WBC # BLD: 4.9 K/UL (ref 4–11)
WBC UA: 1 /HPF (ref 0–5)

## 2022-03-16 PROCEDURE — 84484 ASSAY OF TROPONIN QUANT: CPT

## 2022-03-16 PROCEDURE — 99284 EMERGENCY DEPT VISIT MOD MDM: CPT

## 2022-03-16 PROCEDURE — 36415 COLL VENOUS BLD VENIPUNCTURE: CPT

## 2022-03-16 PROCEDURE — 80053 COMPREHEN METABOLIC PANEL: CPT

## 2022-03-16 PROCEDURE — 1200000000 HC SEMI PRIVATE

## 2022-03-16 PROCEDURE — 70450 CT HEAD/BRAIN W/O DYE: CPT

## 2022-03-16 PROCEDURE — 85025 COMPLETE CBC W/AUTO DIFF WBC: CPT

## 2022-03-16 PROCEDURE — 82803 BLOOD GASES ANY COMBINATION: CPT

## 2022-03-16 PROCEDURE — 71045 X-RAY EXAM CHEST 1 VIEW: CPT

## 2022-03-16 PROCEDURE — 81001 URINALYSIS AUTO W/SCOPE: CPT

## 2022-03-16 PROCEDURE — 83605 ASSAY OF LACTIC ACID: CPT

## 2022-03-16 PROCEDURE — 83735 ASSAY OF MAGNESIUM: CPT

## 2022-03-16 PROCEDURE — 93005 ELECTROCARDIOGRAM TRACING: CPT | Performed by: EMERGENCY MEDICINE

## 2022-03-17 ENCOUNTER — APPOINTMENT (OUTPATIENT)
Dept: CARDIAC CATH/INVASIVE PROCEDURES | Age: 77
DRG: 261 | End: 2022-03-17
Payer: OTHER GOVERNMENT

## 2022-03-17 VITALS
HEART RATE: 65 BPM | BODY MASS INDEX: 29.36 KG/M2 | DIASTOLIC BLOOD PRESSURE: 81 MMHG | HEIGHT: 69 IN | OXYGEN SATURATION: 95 % | SYSTOLIC BLOOD PRESSURE: 138 MMHG | TEMPERATURE: 97.7 F | WEIGHT: 198.19 LBS | RESPIRATION RATE: 18 BRPM

## 2022-03-17 LAB
ANION GAP SERPL CALCULATED.3IONS-SCNC: 9 MMOL/L (ref 3–16)
APTT: 124.1 SEC (ref 26.2–38.6)
APTT: 29.5 SEC (ref 26.2–38.6)
BASOPHILS ABSOLUTE: 0.1 K/UL (ref 0–0.2)
BASOPHILS RELATIVE PERCENT: 1.2 %
BUN BLDV-MCNC: 24 MG/DL (ref 7–20)
CALCIUM SERPL-MCNC: 8.9 MG/DL (ref 8.3–10.6)
CHLORIDE BLD-SCNC: 105 MMOL/L (ref 99–110)
CO2: 27 MMOL/L (ref 21–32)
CREAT SERPL-MCNC: 1.3 MG/DL (ref 0.8–1.3)
EKG ATRIAL RATE: 326 BPM
EKG ATRIAL RATE: 97 BPM
EKG DIAGNOSIS: NORMAL
EKG DIAGNOSIS: NORMAL
EKG Q-T INTERVAL: 392 MS
EKG Q-T INTERVAL: 420 MS
EKG QRS DURATION: 74 MS
EKG QRS DURATION: 78 MS
EKG QTC CALCULATION (BAZETT): 420 MS
EKG QTC CALCULATION (BAZETT): 426 MS
EKG R AXIS: -10 DEGREES
EKG R AXIS: -18 DEGREES
EKG T AXIS: 48 DEGREES
EKG T AXIS: 8 DEGREES
EKG VENTRICULAR RATE: 62 BPM
EKG VENTRICULAR RATE: 69 BPM
EOSINOPHILS ABSOLUTE: 0.1 K/UL (ref 0–0.6)
EOSINOPHILS RELATIVE PERCENT: 2.5 %
GFR AFRICAN AMERICAN: >60
GFR NON-AFRICAN AMERICAN: 54
GLUCOSE BLD-MCNC: 125 MG/DL (ref 70–99)
GLUCOSE BLD-MCNC: 132 MG/DL (ref 70–99)
GLUCOSE BLD-MCNC: 81 MG/DL (ref 70–99)
HCT VFR BLD CALC: 36.4 % (ref 40.5–52.5)
HEMOGLOBIN: 12 G/DL (ref 13.5–17.5)
LYMPHOCYTES ABSOLUTE: 1.8 K/UL (ref 1–5.1)
LYMPHOCYTES RELATIVE PERCENT: 30.8 %
MAGNESIUM: 2.1 MG/DL (ref 1.8–2.4)
MCH RBC QN AUTO: 27.9 PG (ref 26–34)
MCHC RBC AUTO-ENTMCNC: 32.9 G/DL (ref 31–36)
MCV RBC AUTO: 84.8 FL (ref 80–100)
MONOCYTES ABSOLUTE: 0.5 K/UL (ref 0–1.3)
MONOCYTES RELATIVE PERCENT: 8.1 %
NEUTROPHILS ABSOLUTE: 3.3 K/UL (ref 1.7–7.7)
NEUTROPHILS RELATIVE PERCENT: 57.4 %
PDW BLD-RTO: 13.9 % (ref 12.4–15.4)
PERFORMED ON: ABNORMAL
PERFORMED ON: NORMAL
PLATELET # BLD: 134 K/UL (ref 135–450)
PMV BLD AUTO: 8.3 FL (ref 5–10.5)
POTASSIUM SERPL-SCNC: 4 MMOL/L (ref 3.5–5.1)
RBC # BLD: 4.3 M/UL (ref 4.2–5.9)
SODIUM BLD-SCNC: 141 MMOL/L (ref 136–145)
TROPONIN: 0.01 NG/ML
TROPONIN: 0.02 NG/ML
TROPONIN: 0.02 NG/ML
TROPONIN: 0.03 NG/ML
WBC # BLD: 5.8 K/UL (ref 4–11)

## 2022-03-17 PROCEDURE — C1764 EVENT RECORDER, CARDIAC: HCPCS

## 2022-03-17 PROCEDURE — 93005 ELECTROCARDIOGRAM TRACING: CPT | Performed by: STUDENT IN AN ORGANIZED HEALTH CARE EDUCATION/TRAINING PROGRAM

## 2022-03-17 PROCEDURE — 85730 THROMBOPLASTIN TIME PARTIAL: CPT

## 2022-03-17 PROCEDURE — 6360000002 HC RX W HCPCS: Performed by: STUDENT IN AN ORGANIZED HEALTH CARE EDUCATION/TRAINING PROGRAM

## 2022-03-17 PROCEDURE — 85025 COMPLETE CBC W/AUTO DIFF WBC: CPT

## 2022-03-17 PROCEDURE — 0JH602Z INSERTION OF MONITORING DEVICE INTO CHEST SUBCUTANEOUS TISSUE AND FASCIA, OPEN APPROACH: ICD-10-PCS | Performed by: INTERNAL MEDICINE

## 2022-03-17 PROCEDURE — 83735 ASSAY OF MAGNESIUM: CPT

## 2022-03-17 PROCEDURE — 2580000003 HC RX 258: Performed by: STUDENT IN AN ORGANIZED HEALTH CARE EDUCATION/TRAINING PROGRAM

## 2022-03-17 PROCEDURE — 99222 1ST HOSP IP/OBS MODERATE 55: CPT | Performed by: NURSE PRACTITIONER

## 2022-03-17 PROCEDURE — 97530 THERAPEUTIC ACTIVITIES: CPT

## 2022-03-17 PROCEDURE — 93010 ELECTROCARDIOGRAM REPORT: CPT | Performed by: INTERNAL MEDICINE

## 2022-03-17 PROCEDURE — 6370000000 HC RX 637 (ALT 250 FOR IP): Performed by: STUDENT IN AN ORGANIZED HEALTH CARE EDUCATION/TRAINING PROGRAM

## 2022-03-17 PROCEDURE — 33285 INSJ SUBQ CAR RHYTHM MNTR: CPT

## 2022-03-17 PROCEDURE — 97162 PT EVAL MOD COMPLEX 30 MIN: CPT

## 2022-03-17 PROCEDURE — 36415 COLL VENOUS BLD VENIPUNCTURE: CPT

## 2022-03-17 PROCEDURE — 84484 ASSAY OF TROPONIN QUANT: CPT

## 2022-03-17 PROCEDURE — 2500000003 HC RX 250 WO HCPCS

## 2022-03-17 PROCEDURE — 33285 INSJ SUBQ CAR RHYTHM MNTR: CPT | Performed by: INTERNAL MEDICINE

## 2022-03-17 PROCEDURE — 80048 BASIC METABOLIC PNL TOTAL CA: CPT

## 2022-03-17 PROCEDURE — 97166 OT EVAL MOD COMPLEX 45 MIN: CPT

## 2022-03-17 RX ORDER — HEPARIN SODIUM 1000 [USP'U]/ML
30 INJECTION, SOLUTION INTRAVENOUS; SUBCUTANEOUS PRN
Status: DISCONTINUED | OUTPATIENT
Start: 2022-03-17 | End: 2022-03-17

## 2022-03-17 RX ORDER — ACETAMINOPHEN 650 MG/1
650 SUPPOSITORY RECTAL EVERY 6 HOURS PRN
Status: DISCONTINUED | OUTPATIENT
Start: 2022-03-17 | End: 2022-03-17 | Stop reason: HOSPADM

## 2022-03-17 RX ORDER — HEPARIN SODIUM 10000 [USP'U]/100ML
0-3000 INJECTION, SOLUTION INTRAVENOUS CONTINUOUS
Status: DISCONTINUED | OUTPATIENT
Start: 2022-03-17 | End: 2022-03-17

## 2022-03-17 RX ORDER — OMEPRAZOLE 40 MG/1
40 CAPSULE, DELAYED RELEASE ORAL DAILY
COMMUNITY

## 2022-03-17 RX ORDER — SODIUM CHLORIDE 9 MG/ML
25 INJECTION, SOLUTION INTRAVENOUS PRN
Status: DISCONTINUED | OUTPATIENT
Start: 2022-03-17 | End: 2022-03-17 | Stop reason: HOSPADM

## 2022-03-17 RX ORDER — HEPARIN SODIUM 1000 [USP'U]/ML
60 INJECTION, SOLUTION INTRAVENOUS; SUBCUTANEOUS PRN
Status: DISCONTINUED | OUTPATIENT
Start: 2022-03-17 | End: 2022-03-17

## 2022-03-17 RX ORDER — ASPIRIN 81 MG/1
81 TABLET, CHEWABLE ORAL DAILY
Status: DISCONTINUED | OUTPATIENT
Start: 2022-03-17 | End: 2022-03-17 | Stop reason: HOSPADM

## 2022-03-17 RX ORDER — SODIUM CHLORIDE 0.9 % (FLUSH) 0.9 %
5-40 SYRINGE (ML) INJECTION PRN
Status: DISCONTINUED | OUTPATIENT
Start: 2022-03-17 | End: 2022-03-17 | Stop reason: HOSPADM

## 2022-03-17 RX ORDER — ACETAMINOPHEN 325 MG/1
650 TABLET ORAL EVERY 6 HOURS PRN
Status: DISCONTINUED | OUTPATIENT
Start: 2022-03-17 | End: 2022-03-17 | Stop reason: HOSPADM

## 2022-03-17 RX ORDER — TAMSULOSIN HYDROCHLORIDE 0.4 MG/1
0.4 CAPSULE ORAL NIGHTLY
Status: DISCONTINUED | OUTPATIENT
Start: 2022-03-17 | End: 2022-03-17 | Stop reason: HOSPADM

## 2022-03-17 RX ORDER — HEPARIN SODIUM 1000 [USP'U]/ML
4000 INJECTION, SOLUTION INTRAVENOUS; SUBCUTANEOUS ONCE
Status: COMPLETED | OUTPATIENT
Start: 2022-03-17 | End: 2022-03-17

## 2022-03-17 RX ORDER — LISINOPRIL 5 MG/1
5 TABLET ORAL DAILY
Status: DISCONTINUED | OUTPATIENT
Start: 2022-03-17 | End: 2022-03-17 | Stop reason: HOSPADM

## 2022-03-17 RX ORDER — SODIUM CHLORIDE 0.9 % (FLUSH) 0.9 %
5-40 SYRINGE (ML) INJECTION EVERY 12 HOURS SCHEDULED
Status: DISCONTINUED | OUTPATIENT
Start: 2022-03-17 | End: 2022-03-17 | Stop reason: HOSPADM

## 2022-03-17 RX ORDER — ATORVASTATIN CALCIUM 80 MG/1
80 TABLET, FILM COATED ORAL NIGHTLY
Status: DISCONTINUED | OUTPATIENT
Start: 2022-03-17 | End: 2022-03-17 | Stop reason: HOSPADM

## 2022-03-17 RX ADMIN — LISINOPRIL 5 MG: 5 TABLET ORAL at 08:54

## 2022-03-17 RX ADMIN — ATORVASTATIN CALCIUM 80 MG: 80 TABLET, FILM COATED ORAL at 02:46

## 2022-03-17 RX ADMIN — HEPARIN SODIUM 4000 UNITS: 1000 INJECTION INTRAVENOUS; SUBCUTANEOUS at 02:00

## 2022-03-17 RX ADMIN — TAMSULOSIN HYDROCHLORIDE 0.4 MG: 0.4 CAPSULE ORAL at 02:46

## 2022-03-17 RX ADMIN — ASPIRIN 81 MG 81 MG: 81 TABLET ORAL at 08:54

## 2022-03-17 RX ADMIN — HEPARIN SODIUM 1000 UNITS/HR: 10000 INJECTION INTRAVENOUS; SUBCUTANEOUS at 02:53

## 2022-03-17 RX ADMIN — Medication 10 ML: at 08:54

## 2022-03-17 ASSESSMENT — PAIN SCALES - GENERAL
PAINLEVEL_OUTOF10: 0
PAINLEVEL_OUTOF10: 0

## 2022-03-17 NOTE — PROGRESS NOTES
Occupational Therapy   Occupational Therapy Initial Assessment and Tentative D/C    Date: 3/17/2022   Patient Name: Jb Harrison  MRN: [id number]     : 1945    Date of Service: 3/17/2022    Discharge Recommendations: Jb Harrison scored a 21/24 on the AM-PAC ADL Inpatient form. Current research shows that an AM-PAC score of 17 or less is typically not associated with a discharge to the patient's home setting. Based on the patient's AM-PAC score and their current ADL deficits, it is recommended that the patient have 3-5 sessions per week of Occupational Therapy at d/c to increase the patient's independence. Please see assessment section for further patient specific details. If patient discharges prior to next session this note will serve as a discharge summary. Please see below for the latest assessment towards goals. Home with assist PRN,Continue to assess pending progress  OT Equipment Recommendations  Equipment Needed: No    Assessment   Performance deficits / Impairments: Decreased functional mobility ; Decreased ADL status; Decreased endurance;Decreased safe awareness;Decreased balance;Decreased high-level IADLs;Decreased strength  Assessment: Jb Harrison is a 77 y.o. male who presents with syncopal episode at home. He was sitting in a chair at the time. He did not hit his head or fall. On EMS arrival he had a heart rate of 20. He lives with his wife. He was lethargic at that time but states he is back to normal now. He has a history of a left facial droop secondary to brain surgeries with removal of brain tumors. He currently is on blood thinners that he describes as Eliquis. He denies any chest pains or abdominal pain. He denies any other complaints at this time. He states he has been eating and drinking as usual.  He denies any new medications. He currently states he feels normal. PTA pt from home with wife where pt was Ind with mobility and ADLs with use of RW.  Pt currently requires CGA and use of RW mobility and transfers. Anticipate pt needing up to CGA/Min A for ADLs. Pt reports no dizziness/light headedness throughout. Pt reports no concerns returning home at this time. Anticipate pt safe to return home with PRN assist.  Prognosis: Good;Fair  Decision Making: Medium Complexity  Exam: see above  Assistance / Modification: RW  OT Education: OT Role;Transfer Training;Plan of Care;ADL Adaptive Strategies  REQUIRES OT FOLLOW UP: Yes  Activity Tolerance  Activity Tolerance: Patient Tolerated treatment well  Safety Devices  Safety Devices in place: Yes  Type of devices: Left in chair;Chair alarm in place;Call light within reach;Nurse notified;Gait belt           Patient Diagnosis(es): The primary encounter diagnosis was Elevated troponin. Diagnoses of Syncope and collapse and Bradycardia with less than 30 beats per minute were also pertinent to this visit. has a past medical history of Arthritis, Atrial fibrillation ([address] Brain bleed ([address] Brain tumor ([address] Cancer ([address] Cerebral artery occlusion with cerebral infarction ([address] CHF (congestive heart failure) ([address] Chronic renal disease, Deaf, Diabetes ([address] Diabetes mellitus ([address] Endocarditis, High blood pressure, History of blood transfusion, Hyperlipidemia, Left-sided weakness, Peptic ulcer, Sick sinus syndrome ([address] and Slurred speech. has a past surgical history that includes tumor excision; Appendectomy (age 4); Tonsillectomy (age 10); Upper gastrointestinal endoscopy (2013); craniotomy (Right, 8/5/2021); craniotomy (Right, 8/5/2021); Cataract removal with implant (Bilateral); brow lift; Cardiac surgery (2012); Eye surgery (Left, 1/31/2022); and Eye surgery (Left, 1/31/2022). Restrictions  Restrictions/Precautions  Restrictions/Precautions: Fall Risk    Subjective   General  Chart Reviewed: Yes  Patient assessed for rehabilitation services?: Yes  Additional Pertinent Hx: per ED note, Estella Way is a 77 y.o. male who presents with syncopal episode at home. He was sitting in a chair at the time. He did not hit his head or fall. On EMS arrival he had a heart rate of 20. He lives with his wife. He was lethargic at that time but states he is back to normal now. He has a history of a left facial droop secondary to brain surgeries with removal of brain tumors. He currently is on blood thinners that he describes as Eliquis. He denies any chest pains or abdominal pain. He denies any other complaints at this time. He states he has been eating and drinking as usual.  He denies any new medications. He currently states he feels normal.  Family / Caregiver Present: No  Referring Practitioner: Dimitri Shen MD  Subjective  Subjective: Pt agreeable to OT evaluation. Pt with no reports of pain. General Comment  Comments: okay for therapy per RN.   Patient Currently in Pain: No  Pain Assessment  Pain Assessment: 0-10  Pain Level: 0  Vital Signs  Pulse: 65  Heart Rate Source: Monitor  Resp: 18  BP: 138/81  BP Location: Right Arm  MAP (mmHg): 97  Level of Consciousness: Alert (0)  Patient Currently in Pain: No  Social/Functional History  Social/Functional History  Lives With: Spouse  Type of Home: Mobile home  Home Layout: One level  Home Access: Stairs to enter with rails  Entrance Stairs - Number of Steps: 4-5 SPEEDY w/ rails; stair lift if needed  Bathroom Shower/Tub: Walk-in shower  Bathroom Toilet: Handicap height (vanity beside toilet)  Bathroom Equipment: Grab bars in shower  Bathroom Accessibility: Accessible  Home Equipment: 4 wheeled walker,Alert [employer]  ADL Assistance: Independent  Homemaking Assistance: Needs assistance (wife primary)  Ambulation Assistance: Independent (using wh walker)  Transfer Assistance: Independent  Active : No  Patient's  Info: pt has glaucoma  Additional Comments: (reports impulsive at baseline - from his disabiltiy); had previous Craniotomy in 1995 (reports L-sided weakness, slurred speech and facial droop are baseline)       Objective   Vision:  ((glaucoma; had one cataract removed - in process of setting up having other eye taken care of at [place]; working on getting glasses from [place]))  Hearing: Within functional limits    Orientation  Overall Orientation Status: Within Functional Limits     Balance  Sitting Balance: Independent  Standing Balance: Contact guard assistance (RW)  Functional Mobility  Functional - Mobility Device: Rolling Walker  Activity: To/from bathroom; Other (25ft x2)  Assist Level: Contact guard assistance  Functional Mobility Comments: no overt LOB; cues for RW safety; anticipate pt with decreased insignt into safety and poor habits at baseline; pt reports ambulating at baseline  Toilet Transfers  Toilet - Technique: Ambulating (RW)  Equipment Used: Grab bars  Toilet Transfer: Contact guard assistance  Toilet Transfers Comments: cues for hand placement  Wheelchair Bed Transfers  Wheelchair/Bed - Technique: Ambulating (RW)  Equipment Used: Other (chair)  Level of Asssistance: Contact guard assistance  ADL  Toileting: Contact guard assistance (CGA for balance)  Additional Comments: Anticipate pt needing up to CGA/Min A for ADLs based on ROM, strength, and balance  Tone RUE  RUE Tone: Normotonic  Tone LUE  LUE Tone: Normotonic  Quality of Movement Other  Comment: history of CVA affecting L side; WFL     Bed mobility  Supine to Sit: Unable to assess  Sit to Supine: Unable to assess  Scooting: Unable to assess  Transfers  Sit to stand: Contact guard assistance  Stand to sit: Contact guard assistance  Transfer Comments: to/from RW; cues for hand placement     Cognition  Overall Cognitive Status: Exceptions  Arousal/Alertness: Appropriate responses to stimuli  Following Commands:  Follows all commands without difficulty  Safety Judgement: Decreased awareness of need for assistance;Decreased awareness of need for safety  Insights: Decreased awareness of deficits LUE AROM (degrees)  LUE AROM : WFL  RUE AROM (degrees)  RUE AROM : WFL  LUE Strength  Gross LUE Strength: WFL  LUE Strength Comment: history of CVA affecting L side  RUE Strength  Gross RUE Strength: WFL                   Plan   Plan  Times per week: 3-5x  Current Treatment Recommendations: Strengthening,Functional Mobility Training,Gait Training,Endurance Training,Balance Training,Self-Care / ADL,Safety Education & Training,Patient/Caregiver Education & Training      AM-PAC Score        AM-PAC Inpatient Daily Activity Raw Score: 21 (03/17/22 1341)  AM-PAC Inpatient ADL T-Scale Score : 44.27 (03/17/22 1341)  ADL Inpatient CMS 0-100% Score: 32.79 (03/17/22 1341)  ADL Inpatient CMS G-Code Modifier : Julio Cesar Harding (03/17/22 1341)    Goals  Short term goals  Time Frame for Short term goals: prior to D/C  Short term goal 1: complete functional mobility and transfers with supervision  Short term goal 2: complete bathing and dressing with supervision  Short term goal 3: complete toileting with supervision  Short term goal 4: complete grooming in stance at sink with supervision  Long term goals  Time Frame for Long term goals : STG=LTG  Patient Goals   Patient goals : return home       Therapy Time   Individual Concurrent Group Co-treatment   Time In 1310         Time Out 1335         Minutes 25         Timed Code Treatment Minutes: 10 Minutes (15 minute eval)       Heriberto Ground, OTR/L

## 2022-03-17 NOTE — CARE COORDINATION
CASE MANAGEMENT DISCHARGE SUMMARY:    DISCHARGE DATE: 3/17/2022    DISCHARGED TO: Home with family     PT/OT recommending SNF, patient declining needs.    HOME CARE AGENCY: patient Declined need     TRANSPORTATION: Family at bedside              TIME: Patient to coordinate with RN     TAVON Rivas, ANKITA, Social Work/Case Management   [phone]  Electronically signed by TAVON Rivas LSW on 3/17/2022 at 3:19 PM

## 2022-03-17 NOTE — PROCEDURES
[facility]     Electrophysiology Procedure Note       Date of Procedure: 3/17/2022  Patient's Name: Sandy Dolan  YOB: 1945   Medical Record Number: [id number]  Procedure Performed by: Swetha Briggs MD    Procedures performed:  · Loop recorder implantation    Indication of the procedure: Syncope, Palpitation   Sandy Dolan is a 77 y.o. male with syncope. Bradycardia may be responsible for his symptoms but has not been documented. Cardiac monitoring in the past has failed to reveal any arrhythmias, but he is at risk of having atrial arrhythmia, particularly atrial fibrillation. Therefore, he has been scheduled to have ILR implantation. Details of procedure:   Procedure's risks, benefits and alternatives of procedure were explained to patient. All questions were answered. Patient understood and informed consent was obtained. The patient was brought to the electrophysiology lab in a fasting nonsedated state. Patient was prepped and draped in usual sterile fashion. After injection of 2% lidocaine in the left 4th intercostal area, a 5 mm small incision was made. Using ILR insertion device, a small subcutaneous tunnel was created and the loop recorder was inserted under skin. The skin was covered with Steri-Strips. Blood loss<5 cc  No complications. Device information:     The device is Reveal LINQ SN# [id number] Med[employer]onic Implant date: 3/17/2022  R wave 0.40mv  The device was programmed to detect:   VT: 380 ms 16 beats   Bradycardia: 2000 ms     The previous loop placed on the right side at a different hospital several years ago was left in place. Plan:   The patient will have usual post-implant care.      Swetha Briggs MD  Cardiac Electrophysiology  [facility]

## 2022-03-17 NOTE — CARE COORDINATION
INITIAL CASE MANAGEMENT ASSESSMENT     Reviewed chart, spoke with patient, wife, and daughter Kamlesh Lutz) to assess possible discharge needs. Explained Case Management role/services. Living Situation: Verified demographics. Patient lives with spouse in a house. Patient reports no issues or concerns regarding mobility in the house. ADLs: Independent with assistance as needed from wife. DME: walker, wheel chair, cane. PT/OT Recs: Patient reports they are at baseline. Met      Active Services:none; patient declined additional services in the home. Transportation: Family      Medications: [employer] - meds to beds    PCP: Jim Grande MD      HD/PD: n/a    PLAN/COMMENTS: Patient will return home with wife and family support as needed. ACP addressed with patient. SW/CM provided contact information for patient or family to call with any questions. SW/CM will follow and assist as needed.     Minor Cabot, MSW, ANKITA, Social Work/Case Management   [phone]  Electronically signed by Minor Cabot, MSW, LSW on 3/17/2022 at 3:10 PM

## 2022-03-17 NOTE — ED PROVIDER NOTES
[facility]      Pt Name: Mojgan Delarosa  MRN: [id number]  [place] 1945  Date of evaluation: 3/16/2022  Provider: Radha Winter DO    CHIEF COMPLAINT  Chief Complaint   Patient presents with    Altered Mental Status     Pt in via EMS from home after being found unrseponsive by family. EMS states pt was \"lethargic\" on their arrival and had \"heart rate in the 20's\". EMS states that pt became alert and oriented en route. Pt presents to ED alert and oriented at this time with no signs of distress noted. I wore personal protective equipment when I was in the room the entire time. This includes gloves, N95 mask, face shield, and a glove over my stethoscope for protection. HPI  Mojgan Delarosa is a 77 y.o. male who presents with syncopal episode at home. He was sitting in a chair at the time. He did not hit his head or fall. On EMS arrival he had a heart rate of 20. He lives with his wife. He was lethargic at that time but states he is back to normal now. He has a history of a left facial droop secondary to brain surgeries with removal of brain tumors. He currently is on blood thinners that he describes as Eliquis. He denies any chest pains or abdominal pain. He denies any other complaints at this time. He states he has been eating and drinking as usual.  He denies any new medications. He currently states he feels normal.    ? REVIEW OF SYSTEMS  All systems negative except as noted in the HPI. Reviewed Nurses' notes and concur. No LMP for male patient.     PAST MEDICAL HISTORY  Past Medical History:   Diagnosis Date    Arthritis     Atrial fibrillation ([address]     Brain bleed ([address]     from a fall     Brain tumor ([facility] 1983, 1995    surgery to remove tumor left left sided weakness    Cancer [facility])     prostate    Cerebral artery occlusion with cerebral infarction ([address]     related to an intracranial hemmorrhage after a fall     CHF (congestive heart failure) (HCC)     Chronic renal disease     stage 2    Deaf     left ear from 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    [facility]     Diabetes mellitus ([facility]     Endocarditis late 1990's    High blood pressure     History of blood transfusion     Hyperlipidemia     Left-sided weakness     Peptic ulcer 2013    stopped aspirin    Sick sinus syndrome (HCC)     Slurred speech        FAMILY HISTORY  Family History   Problem Relation Age of Onset    Diabetes Mother     Cancer Father         bone    Arthritis Other     Diabetes Other     High Blood Pressure Other        SOCIAL HISTORY   reports that he quit smoking about 61 years ago. His smoking use included cigarettes. He quit after 2.00 years of use. He has never used smokeless tobacco. He reports current alcohol use. He reports that he does not use drugs. SURGICAL HISTORY  Past Surgical History:   Procedure Laterality Date    APPENDECTOMY  age 4   [de-identified] LIFT      browplasty    CARDIAC SURGERY  2012    repair heart valve surgery at [facility] Mitral valve    CATARACT REMOVAL WITH IMPLANT Bilateral     CRANIOTOMY Right 8/5/2021    TREPHINE CRANIOTOMY FOR SUBDURAL EVACUATION performed by Rajat Odom MD at [address] Right 8/5/2021    EXPOSE AND REPLACE PROGRAMMABLE SHUNT VALVE performed by Bimal Webb.  Chris [facility]dom [facility] [facility] Left 1/31/2022    LEFT LOWER LID ECTROPION REPAIR WITH CANTHOPLASTY - LEFT EYE performed by Brian Pringle MD at [facility] Left 1/31/2022    TARSORRHAP[facility] - LEFT EYE [facility] [address] [facility]  [facility] 10   [address] ENDOSCOPY  2013       CURRENT MEDICATIONS  Current Outpatient Rx   Medication Sig Dispense Refill    gatifloxacin (ZYMAR) 0.5 % SOLN 1 drop every 2 hours (while awake)      dorzolamide-timolol (COSOPT) 22.3-6.8 MG/ML ophthalmic solution 1 drop 2 times daily      lisinopril (PRINIVIL;ZESTRIL) 5 MG tablet Take 5 mg by mouth daily      acetaminophen (TYLENOL) 325 MG tablet Take 650 mg by mouth every 6 hours as needed for Pain      tamsulosin (FLOMAX) 0.4 MG capsule Take 0.4 mg by mouth nightly      insulin glargine (LANTUS) 100 UNIT/ML injection vial Inject 14 Units into the skin nightly      carboxymethylcellulose (THERATEARS) 1 % ophthalmic gel Place 1 drop into the left eye 3 times daily as needed for Dry Eyes      dicyclomine (BENTYL) 10 MG capsule Take 10 mg by mouth 4 times daily (before meals and nightly)       furosemide (LASIX) 20 MG tablet Take 20 mg by mouth daily       pravastatin (PRAVACHOL) 80 MG tablet Take 20 mg by mouth nightly          ALLERGIES  Allergies   Allergen Reactions    Iv Contrast [Iodides] Other (See Comments)     Severe hypotension    Sulfa Antibiotics Hives       Tetanus vaccination status reviewed: tetanus re-vaccination not indicated. PHYSICAL EXAM  VITAL SIGNS: BP 98/66   Pulse 82   Temp 98.2 °F (36.8 °C) (Oral)   Resp 17   SpO2 99%   Constitutional: Well-developed, well-nourished, appears normal, nontoxic, activity: Resting comfortably on the cart, speaking full sentences, does not appear ill or toxic  HENT: Normocephalic, Atraumatic, Bilateral external ears normal, TM's were normal, Mucus membranes are moist and oropharynx is patent and clear, No oral exudates, Nose normal, No lingual abrasions, no lingual lacerations, no lingual contusions. Eyes: PERRLA, EOMI, Conjunctiva normal, No discharge. No scleral icterus. Neck: Normal range of motion, No tenderness, Supple. Lymphatic: No lymphadenopathy noted. Cardiovascular: Normal heart rate, Normal rhythm, no murmurs, no gallops, no rubs.   Thorax & Lungs: Normal breath sounds, no respiratory distress, no wheezing, no rales, no rhonchi  Abdomen: Soft, Nontender, No hepatosplenomegaly, No masses, No pulsatile masses, No distension, normal bowel sounds  Skin: Warm, Dry, No erythema, No rash. Extremities: 1+ edema, No tenderness, No cyanosis, No clubbing. No amputations, capillary refill less than 2 seconds. Musculoskeletal:  No tenderness to palpation, no major deformities noted. Neurologic: Alert & oriented x 3, CN 2 through 12 reveal weakness of the left side of his face which is normal for him he states it is no different than usual he also has numbness on that side of his face, left-sided weakness motor function, decreased sensation on left side of his body which is typical for him sensory function, no other or new focal deficits noted, no clonus, no tremors. Psychiatric: Affect mildly flattened, Mood mildly depressed.       LABORATORY  Labs Reviewed   CBC WITH AUTO DIFFERENTIAL - Abnormal; Notable for the following components:       Result Value    Hemoglobin 12.8 (*)     Hematocrit 39.3 (*)     All other components within normal limits   COMPREHENSIVE METABOLIC PANEL W/ REFLEX TO MG FOR LOW K - Abnormal; Notable for the following components:    Glucose 256 (*)     BUN 27 (*)     GFR Non- 54 (*)     Total Protein 6.1 (*)     All other components within normal limits   TROPONIN - Abnormal; Notable for the following components:    Troponin 0.03 (*)     All other components within normal limits   LACTIC ACID - Abnormal; Notable for the following components:    Lactic Acid 2.1 (*)     All other components within normal limits   BLOOD GAS, VENOUS - Abnormal; Notable for the following components:    pH, Louis 7.319 (*)     pCO2, Louis 60.4 (*)     HCO3, Venous 31 (*)     All other components within normal limits   URINALYSIS WITH REFLEX TO CULTURE   MAGNESIUM   TROPONIN     ?  EKG  EKG Interpretation    Interpreted by emergency department physician  Time performed: 2105  Time read: 2107    Rhythm: Atrial fibrillation  Ventricular Rate: 69  QRS Axis: -10  Ectopy: None  Conduction: Atrial fibrillation/flutter with controlled ventricular response  ST Segments: normal  T Waves: normal  Q Waves: None noted    Other findings: None    Compared to EKG on: 8/8/2021 appears unchanged including atrial fibrillation    Clinical Impression: Atrial fibrillation with controlled ventricular response with no ischemic changes noted. This is compared to an EKG on 8/8/2021 and appears unchanged    Donna Reap, DO    RADIOLOGY/PROCEDURES  I personally reviewed the images for this case. CT HEAD WO CONTRAST   Final Result   Artifact degraded images. No acute hemorrhage given limitation. No midline   shift. Other findings as described. XR CHEST PORTABLE   Final Result   No radiographic evidence of acute pulmonary disease. COURSE & MEDICAL DECISION MAKING  Pertinent Labs, EKG, & Imaging studies reviewed. (See chart for details)    Vitals:    03/16/22 2045 03/16/22 2145   BP: 103/68 98/66   Pulse: 73 82   Resp: 18 17   Temp:  98.2 °F (36.8 °C)   TempSrc:  Oral   SpO2: 98% 99%       Medications - No data to display    New Prescriptions    No medications on file       SEP-1 CORE MEASURE DATA  Exclusion criteria: the patient is NOT to be included for sepsis due to: Infection is not suspected    Patient remained stable in the ED. CT scan was negative. Chest x-ray is negative. Troponin was mildly elevated 0.03 which is up from previous troponin. His EKG was nonacute. EMS documented a bradycardic rate of 20 on arrival that spontaneously came up to 60 and patient woke up spontaneously. EKG was nonacute in emergency department. Due to his syncopal episode and documented heart rate of 20 I feel patient needs admitted to the hospital for further evaluation treatment. The patient's blood pressure was not found to be elevated according to CMS/Medicare and the Affordable Care Act/ObamaCare criteria. I reviewed old records     (This chart has been completed using [address].  Although attempts have been made to ensure accuracy, words and/or phrases may not be transcribed as intended.)    Patient refused pain medicines at the time of their exam.    IMPRESSION(S):  1. Elevated troponin    2. Syncope and collapse    3. Bradycardia    4. Bradycardia with less than 30 beats per minute        ?   Recheck Times: 2230, 2320  Critical Care Time: 35 minutes    Diagnostic considerations include but are not limited to:    Hypoxemia/ischemic encephalopathy, hepatic encephalopathy   Seizure or postictal state   Alterations of glucose such as hypoglycemia and hyperglycemia    Alterations in perfusions such as hypotension and hypoperfusion    Alterations in electrolytes such as disturbances in sodium or calcium   Infectious processes such as sepsis from a pneumonia or urinary tract infection    Substance use or withdrawal, especially alcohol and drugs    Medication adverse event or interaction    Vitamin deficiencies such as Wernicke's encephalopathy    CNS lesion, injury, infection (CVA, subdural hematoma, meningitis, encephalitis)    Alterations in hormones such as thyroid or adrenal abnormalities    Alterations in cardiac functioning such as arrhythmia, MI or CHF    Alteration in temperature such as hyperthermia or hypothermia    Dehydration, sleep deprivation   Change in medical regimen    Alteration in lifestyle, environment, or personal relationships       Brie Maxwell DO  03/16/22 6849

## 2022-03-17 NOTE — ED NOTES
Report called to [place], RN on 5N. Denies further questions.      Tawny Bryant RN  03/17/22 8162

## 2022-03-17 NOTE — CONSULTS
Cardiac Electrophysiology Consultation     Date: 3/17/2022  Admit Date:  3/16/2022  Admission Diagnosis: Syncope and collapse [R55]  Elevated troponin [R77.8]  Bradycardia with less than 30 beats per minute [R00.1]     Reason for Consultation: syncope  Consult Requesting Physician: Jenny Meneses DO       History of Present Illness  Halle Navarro is a 77y.o. year old male with past medical history significant for permanent atrial fibrillation, mitral regurgitation s/p MVr, diastolic heart failure, ICH following a fall, brain tumor with residual L-sided weakness, HTN, HLD and DM who presented to the ED following a syncopal episode at home. He was sitting at the table, had not recently gotten up but suddenly lost consciousness. His wife was there and called 911. According to ED note, EMS says his HR was in the 20s upon their arrival. Unfortunately, there is no EMS report available and the strips from EMS show atrial fibrillation in the 50s-60s. Pt does have a ILR in place (R-sided for some reason) but I attempted to interrogate it but it is likely dead as it was placed in 2015. I tried reaching out to EMS but they were unable to provide any additional documentation. The pt denies any issues with dizziness or lightheadedness. He had another syncopal episode while sitting in [place] about 5 years ago without any clear etiology per his account. Denies any recent CP, SOB, palpitations or increased edema. He is essentially wheelchair bound.        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    [facility]     Diabetes mellitus [facility]     Endocarditis late 1990's    High blood pressure     History of blood transfusion     Hyperlipidemia     Left-sided weakness     Peptic ulcer 2013    stopped aspirin    Sick sinus syndrome (HCC)     Slurred speech         Past Surgical History:   Procedure Laterality Date    APPENDECTOMY  age 4   [de-identified] LIFT      browplasty    CARDIAC SURGERY  2012    [facility]air [facility] at [facility] Mitral valve    CATARACT REMOVAL WITH IMPLANT Bilateral     CRANIOTOMY Right 8/5/2021    TREPHINE CRANIOTOMY FOR SUBDURAL EVACUATION performed by Tierney Islas. Margarette Mccord MD [facility] [address] [facility] [address] Right 8/5/2021    EXPOSE AND REPLACE PROGRAMMABLE SHUNT VALVE performed by Tierney Islas.  Margarette [facility]ccord [facility] 1/31/2022    LEFT LOWER LID ECTROPION REPAIR WITH CANTHOPLASTY - LEFT EYE performed by [facility]gel [facility] 1/31/2022    TARSORRHAPHY - LEFT EYE performed by Kelly Siegel MD at [address] [facility]  age 10   [address] [place] [address] ENDOSCOPY  2013       Current Outpatient Medications   Medication Instructions    acetaminophen (TYLENOL) 650 mg, Oral, EVERY 6 HOURS PRN    carboxymethylcellulose (THERATEARS) 1 % ophthalmic gel 1 drop, Left Eye, 3 TIMES DAILY PRN    dicyclomine (BENTYL) 10 mg, Oral, 4 TIMES DAILY BEFORE MEALS & NIGHTLY    dorzolamide-timolol (COSOPT) 22.3-6.8 MG/ML ophthalmic solution 1 drop, 2 TIMES DAILY    furosemide (LASIX) 20 mg, Oral, DAILY    gatifloxacin (ZYMAR) 0.5 % SOLN 1 drop, EVERY 2 HOURS WHILE AWAKE    insulin glargine (LANTUS) 14 Units, SubCUTAneous, NIGHTLY    lisinopril (PRINIVIL;ZESTRIL) 5 mg, Oral, DAILY    pravastatin (PRAVACHOL) 40 mg, Oral, NIGHTLY    tamsulosin (FLOMAX) 0.4 mg, Oral, NIGHTLY        Allergies   Allergen Reactions    Iv Contrast [Iodides] Other (See Comments)     Severe hypotension    Sulfa Antibiotics Hives       Social History:   reports that he quit smoking about 61 years ago. His smoking use included cigarettes. He quit after 2.00 years of use. He has never used smokeless tobacco. He reports current alcohol use. He reports that he does not use drugs. Family History:  family history includes Arthritis in an other family member; Cancer in his father; Diabetes in his mother and another family member; High Blood Pressure in an other family member. Review of Systems:  · General: negative for fever, chills   · Ophthalmic ROS: negative for eye pain or loss of vision  · ENT ROS: negative for headaches, sore throat, nasal drainage  · Respiratory: negative for cough, sputum, SOB  · Cardiovascular: positive for syncope, negative for chest pain, palpitations.   · Gastrointestinal: negative for abdominal pain, diarrhea, N/V  · Hematology: negative for bleeding, blood clots, bruising or jaundice  · Genito-Urinary:  negative for dysuria or incontinence  · Musculoskeletal: negative for joint swelling, muscle pain  · Neurological: positive for chronic L-sided weakness, negative for confusion, dizziness, headaches   · Psychiatric: negative anxiety, depression  · Dermatological: negative for rash    Medications:  Scheduled Meds:   tamsulosin  0.4 mg Oral Nightly    lisinopril  5 mg Oral Daily    sodium chloride flush  5-40 mL IntraVENous 2 times per day    aspirin  81 mg Oral Daily    atorvastatin  80 mg Oral Nightly      Continuous Infusions:   sodium chloride      heparin (PORCINE) Infusion 1,000 Units/hr (03/17/22 0253)     PRN Meds:.sodium chloride flush, sodium chloride, acetaminophen **OR** acetaminophen, perflutren lipid microspheres     Physical Examination:  Vitals:    03/17/22 0730   BP: (!) 127/92   Pulse: 54   Resp: 16   Temp: 97.7 °F (36.5 °C)   SpO2: 95%        Intake/Output Summary (Last 24 hours) at 3/17/2022 0841  Last data filed at 3/17/2022 0658  Gross per 24 hour   Intake -- Output 325 ml   Net -325 ml     In: -   Out: 325    Wt Readings from Last 3 Encounters:   22 198 lb 3.1 oz (89.9 kg)   22 205 lb (93 kg)   21 139 lb 1.8 oz (63.1 kg)     Temp  Av °F (36.7 °C)  Min: 97.7 °F (36.5 °C)  Max: 98.2 °F (36.8 °C)  Pulse  Av.1  Min: 54  Max: 82  BP  Min: 98/66  Max: 143/100  SpO2  Av.3 %  Min: 95 %  Max: 99 %    · Telemetry: Atrial fibrillation v-rates 50s. · Constitutional: Alert, in no acute distress. Appears stated age. · Head: Normocephalic and atraumatic. · Eyes: Conjunctivae normal. EOM are normal.   · Neck: Neck supple. No lymphadenopathy. No rigidity. No JVD present. · Cardiovascular: Normal rate, IRR. No murmurs, rubs or gallops. No S3 or S4.  · Pulmonary/Chest: Clear breath sounds bilaterally. No crackles, wheezes or rhonchi. No respiratory accessory muscle use. · Abdominal: Soft. Normal bowel sounds present. No distension, No tenderness. · Musculoskeletal: No tenderness. No edema    · Lymphadenopathy: Has no cervical adenopathy. · Neurological: Alert and oriented. L-sided hemiparesis, L facial droop. · Skin: Skin is warm and dry. No rash, lesions, ulcerations noted. · Psychiatric: No anxiety nor agitation. Labs:  Reviewed. Recent Labs     22  0526    141   K 4.5 4.0    105   CO2 25 27   BUN 27* 24*   CREATININE 1.3 1.3     Recent Labs     22  0526   WBC 4.9 5.8   HGB 12.8* 12.0*   HCT 39.3* 36.4*   MCV 86.0 84.8    134*     Lab Results   Component Value Date    TROPONINI 0.02 2022     No results found for: BNP  Lab Results   Component Value Date    PROTIME 14.0 2021    PROTIME 14.6 2021    PROTIME 16.5 2021    INR 1.23 2021    INR 1.28 2021    INR 1.44 2021     Lab Results   Component Value Date    CHOL 123 2021    HDL 37 2021    TRIG 65 2021       Diagnostic and imaging results reviewed.      ECG: 3/17/22  Atrial fibrillation at 58 BPM.     Echo: 7/5/19  The left ventricular wall motion is normal.   Mild aortic regurgitation. Mild tricuspid regurgitation is present. The left atrium is dilated. There is marked mitral annular calcification present, which causes   restriction of mitral outflow. Injection of agitated saline showed no interatrial shunt. Overall left ventricular ejection fraction is estimated to be 55-60%. There is mild concentric left ventricular hypertrophy. Cath: 6/24/05  1.    Coronary angiography summary: There is no significant coronary narrowing in a right   dominant            system.            a.  The LMCA is normal.              b.  The LAD is diffusely somewhat enlarged.  There appears to be turbulent flow   diffusely and there is         no significant focal narrowing.            c.  The circumflex gives rise to two major marginals and has minor irregularities   and no significant          stenoses.     d. The right coronary is a quite large dominant vessel which gives rise to three   posterolaterals         and a PDA and has minor irregularities and no significant stenoses.     2.     Aortography in the left anterior oblique projection utilizing a 60 cc injection of   contrast demonstrates             only 1+ aortic insufficiency.     3.     Left ventriculography in the ESTRADA projection utilizing 35 cc's of non-ionic contrast   demonstrates .                                likely 3+ mitral insufficiency.  There is normal global LV contractility.  LVEF   is approximately              50-55%.     4.      Hemodynamics:                a.  Right heart pressures: Right atrial pressure is 9.  RV is 38/9. PA is 38/18.     W Francisco Nury is 25.              b.  Left heart pressures: Aortic pressure is 152 over approximately 60. LV is   152/18.  There is no                   gradient on pull back.     5.      There are no complications.     6.      The plan will be to proceed the transesophageal echo. The findings of   catheterization are              significantly diversion with regard to the aortic valve insufficiency compared to   prior echo. We              will further evaluate both the mitral as well as aortic insufficiency with   transesophageal echo.     7.      There are no complications.         Assessment & Plan:    Syncope   - concern for bradyarrhythmia given reported HR in the 20s when EMS arrived but unfortunately, the strips from EMS does not show a HR lower than 55 BPM and no report is available   - pt had previous ILR but it is most likely dead as it is over 10years old and unable to be interrogated   - no significant bradyarrhythmias or pauses seen since admitted but he does have relatively slow atrial fibrillation despite being on no AV parker blocking meds so likely does have some conduction disease   - recommend ILR implantation for further monitoring as 30 day monitor likely to have low yield with last syncopal event 5 years ago, pt is agreeable    Permanent atrial fibrillation   - controlled to slow v-rates, 50-70s on no meds for this   - followed by Dr. Marizol Narvaez at [facility], assume no plans to rhythm control given conduction disease and no previous attempts at cardioversion/AADs   - CHADS2-VASc 5 (age, HF, HTN, DM) on Eliquis 5mg BID, will resume    Elevated troponin   - unlikely ACS as trend is flat and peak 0.03   - normal coronaries on remote C In 2005   - denies any angina   - stop heparin drip    Chronic diastolic heart failure   - EF 55-60%   - appears well compensated   - continue home medications    Discussed with Dr. Leonardo Dee.     KRISTAL Menard  [facility], [address] [facility]  Phone: [phone]  Fax: [phone]    Electronically signed by KRISTAL Randhawa - CNP on 3/17/2022 at 8:41 AM

## 2022-03-17 NOTE — H&P
H&P Update    I have reviewed the history and physical that I performed earlier today and examined the patient and find no relevant changes. I have reviewed with the patient and/or family the risks, benefits, and alternatives to the procedure. Pre-sedation Assessment    Patient:  Anika Diggs   :   1945  Intended Procedure: Loop implant      Nurses notes reviewed and agreed. Medications reviewed  Allergies:    Allergies   Allergen Reactions    Iv Contrast [Iodides] Other (See Comments)     Severe hypotension    Sulfa Antibiotics Hives         Pre-Procedure Assessment/Plan:  ASA 3 - Patient with moderate systemic disease with functional limitations    Level of Sedation Plan:No sedation    Post Procedure plan: Return to same level of care    Benji Castro MD  Cardiac Electrophysiology  [facility]

## 2022-03-17 NOTE — ACP (ADVANCE CARE PLANNING)
Advance Care Planning     Advance Care Planning Activator (Inpatient)  Conversation Note      Date of ACP Conversation: 3/17/2022     Conversation Conducted with: Patient with Decision Making Capacity    ACP Activator: TAVON Sesay, [facility] Decision Maker:     Current Designated Health Care Decision Maker:     Primary Decision Maker: Desiree Cordon - [phone]    Secondary Decision Maker: Anny Holder - [phone]    Today we documented Decision Maker(s). The patient will provide ACP documents.        Electronically signed by TAVON Sesay, ANKITA on 3/17/2022 at 3:11 PM

## 2022-03-17 NOTE — PROGRESS NOTES
Patient leaving floor to go to cath lab at this time. RN will assess patient when he returns to unit.

## 2022-03-17 NOTE — H&P
Hospital Medicine History & Physical      PCP: Ting Diggs MD    Date of Admission: 3/16/2022    Date of Service: Pt seen/examined on 3/16/2022 and admitted to    Chief Complaint: Syncope      History Of Present Illness: The patient is a 77 y.o. male with past medical history as below and has previous residual left-sided facial deficits who presents to [facility] coming from home apparently when EMS found him is that patient apparently was at dinner with family and only remembers was passing out and that when he woke up he was apparently surrounded by EMS. Per EMS report, patient had initial heart rate in the 20s and was also slightly hypotensive but uncertain as to how hypotensive. He apparently was only out for a few minutes from what can be understood from the EMS report. He had not had any previous occurrence of syncope and patient denies that he was feeling unwell today. He had overall been eating and drinking just fine. He denies any other recent symptoms of fever, chills, dizziness, chest pain, shortness of breath, dysuria, blood in urine/stool/sputum, nausea or vomiting or diarrhea. No family members are at bedside but patient seems to be alert and oriented and back to baseline. He remarks that he has had left-sided facial deficits from his previous occurrence of stroke and neurologic issues but was overall stable.     Past Medical History:        Diagnosis Date    Arthritis     Atrial fibrillation ([address]     Brain bleed ([address]     from a fall     Brain tumor ([address] 1983, 1995    surgery to remove tumor left left sided weakness    Cancer [facility])     prostate    Cerebral artery occlusion with cerebral infarction ([address]     related to an intracranial hemmorrhage after a fall     CHF (congestive heart failure) ([address]     Chronic renal disease stage 2    Deaf     left ear from 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    [facility]     Diabetes mellitus [facility]     Endocarditis late 1990's    High blood pressure     History of blood transfusion     Hyperlipidemia     Left-sided weakness     Peptic ulcer 2013    stopped aspirin    Sick sinus syndrome (HCC)     Slurred speech        Past Surgical History:        Procedure Laterality Date    APPENDECTOMY  age 4   [de-identified] LIFT      browplasty    CARDIAC SURGERY  2012    [facility] Mitral valve    CATARACT REMOVAL WITH IMPLANT Bilateral     CRANIOTOMY Right 8/5/2021    TREPHINE CRANIOTOMY FOR SUBDURAL EVACUATION performed by Trinh Bryan. Fady Ramírez MD at [address] [facility] Right 8/5/2021    EXPOSE AND REPLACE PROGRAMMABLE SHUNT VALVE performed by Trinh Bryan. Fady Ramírez MD at [address] [facility] Left 1/31/2022    LEFT LOWER LID ECTROPION REPAIR WITH CANTHOPLASTY - LEFT EYE performed by Haylie Maher MD at [facility] Left 1/31/2022    TARSORRHAPHY - LEFT EYE performed by Haylie Maher MD at [facility]  age 10   [address] [facility]  2013       Medications Prior to Admission:    Prior to Admission medications    Medication Sig Start Date End Date Taking?  Authorizing Provider   gatifloxacin (ZYMAR) 0.5 % SOLN 1 drop every 2 hours (while awake)    Historical Provider, MD   dorzolamide-timolol (COSOPT) 22.3-6.8 MG/ML ophthalmic solution 1 drop 2 times daily    Historical Provider, MD   lisinopril (PRINIVIL;ZESTRIL) 5 MG tablet Take 5 mg by mouth daily    Historical Provider, MD   acetaminophen (TYLENOL) 325 MG tablet Take 650 mg by mouth every 6 hours as needed for Pain    Historical Provider, MD   tamsulosin (FLOMAX) 0.4 MG capsule Take 0.4 mg by mouth nightly 5/17/21   Historical Provider, MD   insulin glargine (LANTUS) 100 UNIT/ML injection vial Inject 14 Units into the skin nightly Historical Provider, MD   carboxymethylcellulose (THERATEARS) 1 % ophthalmic gel Place 1 drop into the left eye 3 times daily as needed for Dry Eyes 2/4/21   Historical Provider, MD   dicyclomine (BENTYL) 10 MG capsule Take 10 mg by mouth 4 times daily (before meals and nightly)     Historical Provider, MD   furosemide (LASIX) 20 MG tablet Take 20 mg by mouth daily     Historical Provider, MD   pravastatin (PRAVACHOL) 80 MG tablet Take 40 mg by mouth nightly     Historical Provider, MD       Allergies: Iv contrast [iodides] and Sulfa antibiotics    Social History:  The patient currently lives home    TOBACCO:   reports that he quit smoking about 61 years ago. His smoking use included cigarettes. He quit after 2.00 years of use. He has never used smokeless tobacco.  ETOH:   reports current alcohol use. Family History:  Reviewed in detail and negative for DM, Early CAD, Cancer, CVA. Positive as follows:        Problem Relation Age of Onset    Diabetes Mother     Cancer Father         bone    Arthritis Other     Diabetes Other     High Blood Pressure Other        REVIEW OF SYSTEMS:   as noted in the HPI. All other systems reviewed and negative. PHYSICAL EXAM:    BP (!) 127/92   Pulse 54   Temp 97.7 °F (36.5 °C) (Oral)   Resp 16   Ht 5' 9\" (1.753 m)   Wt 198 lb 3.1 oz (89.9 kg)   SpO2 95%   BMI 29.27 kg/m²     General appearance: Patient has noted left-sided ptosis and some component of left facial droop but he notes that this is at baseline, he is currently alert and oriented, has no acute respiratory distress  HEENT Normal cephalic, atraumatic without obvious deformity. PERRLA as noted in EOMI is noted although patient still has left-sided ptosis and left-sided facial droop, mildly dry mucous membranes, anicteric sclera  Neck: Supple, no JVD  Lungs: Clear to auscultation, bilaterally without Rales/Wheezes/Rhonchi with good respiratory effort.   Heart: Currently seems to be regular rate and rhythm, no murmurs noted  Abdomen: Soft, non-tender or non-distended without rigidity or guarding and positive bowel sounds all four quadrants. Extremities: No edema  Skin: No rash  Neurologic: Apart from the left-sided facial droop as well as left facial ptosis, patient's other cranial nerves are all intact 2 through 12, patient has 5 out of 5 strength all extremities. No family is at bedside to confirm that patient seems to be trustworthy and alert and oriented  Mental status: Alert, oriented, thought content appropriate. Capillary Refill: Acceptable  < 3 seconds  Peripheral Pulses: +3 Easily felt, not easily obliterated with pressure      CT head without contrast: No acute process  Chest x-ray: No acute process    CBC   Recent Labs     03/16/22 2112 03/17/22 0526   WBC 4.9 5.8   HGB 12.8* 12.0*   HCT 39.3* 36.4*    134*      RENAL  Recent Labs     03/16/22 2112 03/17/22 0526    141   K 4.5 4.0    105   CO2 25 27   BUN 27* 24*   CREATININE 1.3 1.3     LFT'S  Recent Labs     03/16/22 2112   AST 18   ALT 14   BILITOT 0.3   ALKPHOS 83     COAG  No results for input(s): INR in the last 72 hours.   CARDIAC ENZYMES  Recent Labs     03/17/22  0000 03/17/22  0335 03/17/22 0526   TROPONINI 0.01 0.02* 0.02*       U/A:    Lab Results   Component Value Date    COLORU YELLOW 03/16/2022    WBCUA 1 03/16/2022    RBCUA 1 03/16/2022    CLARITYU Clear 03/16/2022    SPECGRAV 1.012 03/16/2022    LEUKOCYTESUR Negative 03/16/2022    BLOODU Negative 03/16/2022    GLUCOSEU Negative 03/16/2022       ABG  No results found for: QJQ9POG, BEART, G4VGGNOV, PHART, THGBART, RLE3DEH, PO2ART, LLM4CAT        Active Hospital Problems    Diagnosis Date Noted    AMS (altered mental status) [R41.82] 03/16/2022    Bradycardia [R00.1] 03/16/2022    Elevated troponin [R77.8] 03/16/2022    Syncope and collapse [R55] 03/16/2022    Atrial fibrillation ([address] [I48.91] 08/10/2021    DM2 (diabetes mellitus, type 2) ([facility] [E11.9] 04/06/2015         PHYSICIANS CERTIFICATION:    I certify that Sandy Dolan is expected to be hospitalized for greater than 2 midnights based on the following assessment and plan:      ASSESSMENT/PLAN:  · Syncope  · Altered mental status  · Bradycardia  · Elevated troponin  · Atrial fibrillation  · Type 2 diabetes    Plan:  · Currently patient's telemetry and EKG did not demonstrate any noted heart block or any concerning bradycardia but patient was significantly bradycardic on initial EMS presentation with a heart rate in the 20s and he had syncopized apparently at home. He seems to remember most of the events but has a brief loss of recall after the events and prior to EMS arriving. S suspect some component of arrhythmia is a possible and patient may have some component of NSTEMI given elevated troponin  · Trending troponins, started patient on heparin IV infusion for now  · Monitor on telemetry  · Start patient on aspirin and statin, restart patient's lisinopril and Flomax in the morning  · Holding any beta-blockers or other antiarrhythmics at this time  · Repeat daily CBC/BMP/magnesium, target calcium level of 4 and magnesium level of 2  · Clear liquid diet at this time  · Order transthoracic echo for the morning  · Cardiology consulted for the morning      DVT Prophylaxis: Heparin IV infusion  Diet: ADULT DIET; Clear Liquid; No Caffeine  Code Status: Full Code  PT/OT Eval Status: Ambulatory    Dispo -pending clinical course       Lyle Alan DO    Thank you Deya Winter MD for the opportunity to be involved in this patient's care. If you have any questions or concerns please feel free to contact me at [phone].

## 2022-03-17 NOTE — PROGRESS NOTES
IV and telemetry removed with no complications. Discharge instructions and medications reviewed and all questions answered at this time. Patient wheeled down in wheelchair with nursing staff to go home via private car.

## 2022-03-17 NOTE — PROGRESS NOTES
Clinical Pharmacy Note  Heparin Dosing Consult    Gia Recinos is a 77 y.o. male ordered heparin per low dose nomogram by Dr. Devan Barajas. Lab Results   Component Value Date    APTT 34.1 08/06/2021     Lab Results   Component Value Date    HGB 12.8 03/16/2022    HCT 39.3 03/16/2022     03/16/2022    INR 1.23 08/06/2021       Ht Readings from Last 1 Encounters:   03/17/22 5' 9\" (1.753 m)        Wt Readings from Last 1 Encounters:   03/17/22 198 lb 3.1 oz (89.9 kg)        Assessment/Plan:  Initial bolus: 4000 units  Initial infusion rate: 1000 units/hr  Next aPTT: 0800  3/17/22    Pharmacy will continue to monitor adjust heparin based on aPTT results using nomogram below:     LOW DOSE HEPARIN PROTOCOL (ACS/STEMI/A FIB)     Initial Bolus: 60 units/kg Max Bolus: 4,000 units       Initial Rate: 12 units/kg/hr Max Initial Rate: 1,000 units/hr     aPTT < 45   Heparin 60 units/kg bolus Increase infusion by 4 units/kg/hr       (maximum 4,000 units)   aPTT 45-59.9   Heparin 30 units/kg bolus Increase infusion by 2 units/kg/hr       (maximum 2,000 units)   aPTT 60-90   No bolus   No change   aPTT 90.1-97.5 No bolus   Decrease infusion by 1 units/kg/hr   aPTT 97.6-105  No bolus     Decrease infusion by 2 units/kg/hr   aPTT > 105   Hold heparin for 1 hour Decrease infusion by 3 units/kg/hr     Obtain aPTT 6 hours after initial bolus and 6 hours after any dose change until two consecutive therapeutic aPTTs are achieved - then daily.     Anca Ryan [facility]  3/17/2022 1:25 AM

## 2022-03-17 NOTE — PROGRESS NOTES
Physical Therapy    Facility/Department: [address] PROGRESSIVE CARE  Initial Assessment    NAME: Yeny Keita  : 1945  MRN: [id number]    Date of Service: 3/17/2022    Discharge Recommendations:  Home with assist PRN   PT Equipment Recommendations  Equipment Needed: No   Yeny Keita scored a 20/24 on the AM-PAC short mobility form. At this time, no further PT is recommended upon discharge. Assessment   Body structures, Functions, Activity limitations: Decreased functional mobility ; Decreased safe awareness;Decreased endurance;Decreased balance  Assessment: 78 y/o male admit 3/16/2022 with Syncope/Collapse, Bradycardia. 3/17/2022 S/P Implantable Loop Recorder Implant. PMH as noted including Brain Tumore/Craniotomy (), Subdural Hematoma/Craniotomy (2021), CVA, CHF, SSS. PTA pt living with wife in mobile home with few steps to enter (ramp access); independent daily care and functional mobility (with [facility]). Pt rio arenas within hospital room setting with [facility] CGA. Cues for safety awareness although no specific LOB noted. Suspect mobility at/very near baseline. At this time, anticipate adequate progress/assist for d/c home. Do not anticipate need cont PT Services upon d/c. Will monitor pt's progress. Prognosis: Fair;Good  Decision Making: Medium Complexity  History: 78 y/o male admit 3/16/2022 with Syncope/Collapse, Bradycardia. 3/17/2022 S/P Implantable Loop Recorder Implant. PMH as noted including Brain Tumore/Craniotomy (), Subdural Hematoma/Craniotomy (2021), CVA, CHF, SSS. Exam: See above. Clinical Presentation: See above. Patient Education: Role of PT, POC, Need to call for assist, Safe use of Walker. Barriers to Learning: None. REQUIRES PT FOLLOW UP: Yes  Activity Tolerance  Activity Tolerance: Patient Tolerated treatment well  Activity Tolerance: Pt rio arenas within hospital room setting with Walker CGA.   Cues for safety awareness although no specific LOB noted.  Suspect mobility at/very near baseline. Patient Diagnosis(es): The primary encounter diagnosis was Elevated troponin. Diagnoses of Syncope and collapse and Bradycardia with less than 30 beats per minute were also pertinent to this visit. has a past medical history of Arthritis, Atrial fibrillation ([address] Brain bleed ([address] Brain tumor ([address] Cancer ([address] Cerebral artery occlusion with cerebral infarction ([address] CHF (congestive heart failure) ([address] Chronic renal disease, Deaf, Diabetes ([address] Diabetes mellitus ([address] Endocarditis, High blood pressure, History of blood transfusion, Hyperlipidemia, Left-sided weakness, Peptic ulcer, Sick sinus syndrome ([address] and Slurred speech. has a past surgical history that includes tumor excision; Appendectomy (age 4); Tonsillectomy (age 10); Upper gastrointestinal endoscopy (2013); craniotomy (Right, 8/5/2021); craniotomy (Right, 8/5/2021); Cataract removal with implant (Bilateral); brow lift; Cardiac surgery (2012); Eye surgery (Left, 1/31/2022); and Eye surgery (Left, 1/31/2022). Restrictions  Restrictions/Precautions  Restrictions/Precautions: Fall Risk  Vision/Hearing  Vision:  ((H/O Glaucoma; had one cataract removed - in process of setting up having other eye taken care of at [place]; working on getting glasses from [place]))  Hearing: Within functional limits     Subjective  General  Chart Reviewed: Yes  Patient assessed for rehabilitation services?: Yes  Additional Pertinent Hx: 78 y/o male admit 3/16/2022 with Syncope/Collapse, Bradycardia. 3/17/2022 S/P Implantable Loop Recorder Implant. PMH as noted including Brain Tumore/Craniotomy (1995), Subdural Hematoma/Craniotomy (8/2021), CVA, CHF, SSS. Response To Previous Treatment: Not applicable  Family / Caregiver Present: No  Referring Practitioner: Dr Angeles Both  Referral Date : 03/17/22  Diagnosis: Syncope; A-fib; CHF  Other (Comment): Pt agreeable to PT Eval/Rx.   Subjective  Subjective: Pt agreeable to PT Eval/Rx. Pain Screening  Patient Currently in Pain: No          Orientation  Orientation  Overall Orientation Status: Within Functional Limits  Social/Functional History  Social/Functional History  Lives With: Spouse  Type of Home: Mobile home  Home Layout: One level  Home Access: Stairs to enter with rails  Entrance Stairs - Number of Steps: 4-5 SPEEDY w/ rails; stair lift if needed  Bathroom Shower/Tub: Walk-in shower  Bathroom Toilet: Handicap height (vanity beside toilet)  Bathroom Equipment: Grab bars in shower  Bathroom Accessibility: Accessible  Home Equipment: 4 wheeled walker,Alert [employer]  ADL Assistance: [address]: Needs assistance (Wife takes care of homemaking needs.)  Ambulation Assistance: Independent (With Johana Bill.)  Transfer Assistance: Independent  Active : No  Patient's  Info: pt has glaucoma  Additional Comments: (Reports impulsive at baseline - from his disability); had previous Craniotomy in 1995 (reports L-sided weakness, slurred speech and facial droop are baseline). Cognition   Cognition  Overall Cognitive Status: Exceptions  Arousal/Alertness: Appropriate responses to stimuli  Following Commands: Follows all commands without difficulty  Safety Judgement: Decreased awareness of need for assistance;Decreased awareness of need for safety  Insights: Decreased awareness of deficits    Objective     Observation/Palpation  Observation: L Facial Droop/Slurred Speech (chronic). AROM RLE (degrees)  RLE AROM: WFL  AROM LLE (degrees)  LLE AROM : WFL  AROM RUE (degrees)  RUE AROM : WFL  AROM LUE (degrees)  LUE AROM : WFL  Strength RLE  Strength RLE: WFL  Strength LLE  Strength LLE: WFL  Strength RUE  Strength RUE: WFL  Strength LUE  Strength LUE: WFL  Strength Other  Other: Mild weak L side although adequate for functional activities. Bed mobility  Comment: Pt oob prior PT arrival.  Transfers  Sit to Stand: Contact guard assistance (With Geryl Fly.   Cues for safe hand placement.)  Stand to sit: Contact guard assistance (With [employer]. Cues for safe hand placement.)  Ambulation  Ambulation?: Yes  Ambulation 1  Surface: level tile  Device: Rolling Walker  Distance: Pt amb 25' x 2 with Walker CGA. Flexed posture, short/shuffling steps; cues to remain close/safe distance within perimeter of Walker. Cues for safe transitional mvts although no specific LOB noted. Balance  Sitting - Static: Good  Sitting - Dynamic: Good  Standing - Static: Fair;+ (With Walker.)  Standing - Dynamic: Fair;+ (With Walker.)        Plan   Plan  Times per week: 3-5x week while in acute care setting. Current Treatment Recommendations: Functional Mobility Training,Transfer Training,Gait Training,Safety Education & Training,Patient/Caregiver Education & Training  Safety Devices  Type of devices: Call light within reach,Left in chair,Nurse notified      AM-PAC Score  AM-PAC Inpatient Mobility Raw Score : 20 (03/17/22 1510)  AM-PAC Inpatient T-Scale Score : 47.67 (03/17/22 1510)  Mobility Inpatient CMS 0-100% Score: 35.83 (03/17/22 1510)  Mobility Inpatient CMS G-Code Modifier : Bernarda Zaho (03/17/22 1510)          Goals  Short term goals  Time Frame for Short term goals: Upon d/c acute care setting. Short term goal 1: Bed Mob Supervision. Short term goal 2: Transfers with assist device Supervision. Short term goal 3: Amb with assist device 48' SBA/CGA. Patient Goals   Patient goals : Return home.        Therapy Time   Individual Concurrent Group Co-treatment   Time In [address] [place]         Time Out 1340         Minutes [address] Amaya Lutz

## 2022-03-24 ENCOUNTER — NURSE ONLY (OUTPATIENT)
Dept: CARDIOLOGY CLINIC | Age: 77
End: 2022-03-24

## 2022-03-24 DIAGNOSIS — Z45.09 ENCOUNTER FOR ELECTRONIC ANALYSIS OF REVEAL EVENT RECORDER: Chronic | ICD-10-CM

## 2022-03-24 NOTE — PROGRESS NOTES
Pt in clinic today for Loop recorder implant site check and device function confirmation. Outer bandage removed, steri strips left in place. Minimal amount of drainage beneath steri strips. Site appears dry, well approximated, and free of signs of infection. Wound care instructions reviewed with patient and spouse. They vocalized understanding. Device appears to be functioing as programmed. Patient with 81.3% AT/AF burden   This has been previously diagnosed. Pt is on [address] [facility] Eliquis 5mg 1 tab po bid    Home monitor has yet to transmit but appears to be working appropriately. Serial number on transmitter confirmed and entered into [employer].

## 2022-04-01 NOTE — DISCHARGE SUMMARY
scene. Patient had a nonfunctional loop recorder in place which could not be interrogated. Cardiology was consulted and patient underwent a loop recorder placement. Patient is to follow-up with cardiology as an outpatient. At the time of discharge patient is feeling well and denies any complaints.                            Discharge Condition:  stable      Discharged to:  Home      Activity:   as tolerated:     Follow Up: Follow-up with PCP in 1-2 weeks            Labs:  For convenience and continuity at follow-up the following most recent labs are provided:      CBC:   Lab Results   Component Value Date    WBC 5.8 03/17/2022    HGB 12.0 03/17/2022    HCT 36.4 03/17/2022     03/17/2022       RENAL:   Lab Results   Component Value Date     03/17/2022    K 4.0 03/17/2022    K 4.5 03/16/2022     03/17/2022    CO2 27 03/17/2022    BUN 24 03/17/2022    CREATININE 1.3 03/17/2022           Discharge Medications:      Medication List      CONTINUE taking these medications    acetaminophen 325 MG tablet  Commonly known as: TYLENOL     apixaban 5 MG Tabs tablet  Commonly known as: ELIQUIS     carboxymethylcellulose 1 % ophthalmic gel  Commonly known as: THERATEARS     dicyclomine 10 MG capsule  Commonly known as: BENTYL     dorzolamide-timolol 22.3-6.8 MG/ML ophthalmic solution  Commonly known as: COSOPT     furosemide 20 MG tablet  Commonly known as: LASIX     insulin glargine 100 UNIT/ML injection vial  Commonly known as: LANTUS     lisinopril 10 MG tablet  Commonly known as: PRINIVIL;ZESTRIL     omeprazole 40 MG delayed release capsule  Commonly known as: PRILOSEC     pravastatin 80 MG tablet  Commonly known as: PRAVACHOL     tamsulosin 0.4 MG capsule  Commonly known as: FLOMAX        STOP taking these medications    gatifloxacin 0.5 % Soln  Commonly known as: ZYMAR               Time Spent on discharge is more than 30 min in the examination, evaluation, counseling and review of medications and discharge plan. Signed:  Ivy Hendricks MD   4/1/2022      Thank you Haydee Osorio MD for the opportunity to be involved in this patient's care. If you have any questions or concerns please feel free to contact me at [phone]. This note was transcribed using [id number]. Please disregard any translational errors.

## 2022-04-15 PROBLEM — R77.8 ELEVATED TROPONIN: Status: RESOLVED | Noted: 2022-03-16 | Resolved: 2022-04-15

## 2022-04-15 PROBLEM — R79.89 ELEVATED TROPONIN: Status: RESOLVED | Noted: 2022-03-16 | Resolved: 2022-04-15

## 2022-04-20 ENCOUNTER — NURSE ONLY (OUTPATIENT)
Dept: CARDIOLOGY CLINIC | Age: 77
End: 2022-04-20
Payer: MEDICARE

## 2022-04-20 DIAGNOSIS — R55 SYNCOPE AND COLLAPSE: ICD-10-CM

## 2022-04-20 DIAGNOSIS — Z45.09 ENCOUNTER FOR ELECTRONIC ANALYSIS OF REVEAL EVENT RECORDER: Chronic | ICD-10-CM

## 2022-04-20 DIAGNOSIS — R00.1 BRADYCARDIA: ICD-10-CM

## 2022-04-21 PROCEDURE — 93298 REM INTERROG DEV EVAL SCRMS: CPT | Performed by: INTERNAL MEDICINE

## 2022-04-21 PROCEDURE — G2066 INTER DEVC REMOTE 30D: HCPCS | Performed by: INTERNAL MEDICINE

## 2022-04-21 NOTE — PROGRESS NOTES
ILR for syncope/rebecca. Known hx AF (Eliquis). Remote transmission received from patient's monitor at home. Remote LinqII report shows known AF, 77.4% burden (Eliquis). PVC burden 0.6%. No symptom episodes recorded. EP physician to review. See PACEART report under Cardiology tab. Will continue to monitor remotely.

## 2022-12-12 ENCOUNTER — OFFICE VISIT (OUTPATIENT)
Dept: FAMILY MEDICINE CLINIC | Age: 77
End: 2022-12-12
Payer: MEDICARE

## 2022-12-12 VITALS
HEART RATE: 72 BPM | DIASTOLIC BLOOD PRESSURE: 64 MMHG | HEIGHT: 69 IN | BODY MASS INDEX: 29.27 KG/M2 | SYSTOLIC BLOOD PRESSURE: 118 MMHG | OXYGEN SATURATION: 97 %

## 2022-12-12 DIAGNOSIS — E11.9 TYPE 2 DIABETES MELLITUS WITHOUT COMPLICATION, UNSPECIFIED WHETHER LONG TERM INSULIN USE (HCC): Primary | ICD-10-CM

## 2022-12-12 LAB — HBA1C MFR BLD: 6.5 %

## 2022-12-12 PROCEDURE — 83036 HEMOGLOBIN GLYCOSYLATED A1C: CPT | Performed by: NURSE PRACTITIONER

## 2022-12-12 PROCEDURE — 99214 OFFICE O/P EST MOD 30 MIN: CPT | Performed by: NURSE PRACTITIONER

## 2022-12-12 PROCEDURE — 1123F ACP DISCUSS/DSCN MKR DOCD: CPT | Performed by: NURSE PRACTITIONER

## 2022-12-12 PROCEDURE — 3044F HG A1C LEVEL LT 7.0%: CPT | Performed by: NURSE PRACTITIONER

## 2022-12-12 PROCEDURE — 3078F DIAST BP <80 MM HG: CPT | Performed by: NURSE PRACTITIONER

## 2022-12-12 PROCEDURE — 3074F SYST BP LT 130 MM HG: CPT | Performed by: NURSE PRACTITIONER

## 2022-12-12 SDOH — ECONOMIC STABILITY: TRANSPORTATION INSECURITY
IN THE PAST 12 MONTHS, HAS THE LACK OF TRANSPORTATION KEPT YOU FROM MEDICAL APPOINTMENTS OR FROM GETTING MEDICATIONS?: NO

## 2022-12-12 SDOH — ECONOMIC STABILITY: TRANSPORTATION INSECURITY
IN THE PAST 12 MONTHS, HAS LACK OF TRANSPORTATION KEPT YOU FROM MEETINGS, WORK, OR FROM GETTING THINGS NEEDED FOR DAILY LIVING?: NO

## 2022-12-12 SDOH — ECONOMIC STABILITY: FOOD INSECURITY: WITHIN THE PAST 12 MONTHS, THE FOOD YOU BOUGHT JUST DIDN'T LAST AND YOU DIDN'T HAVE MONEY TO GET MORE.: NEVER TRUE

## 2022-12-12 SDOH — ECONOMIC STABILITY: FOOD INSECURITY: WITHIN THE PAST 12 MONTHS, YOU WORRIED THAT YOUR FOOD WOULD RUN OUT BEFORE YOU GOT MONEY TO BUY MORE.: NEVER TRUE

## 2022-12-12 ASSESSMENT — SOCIAL DETERMINANTS OF HEALTH (SDOH): HOW HARD IS IT FOR YOU TO PAY FOR THE VERY BASICS LIKE FOOD, HOUSING, MEDICAL CARE, AND HEATING?: NOT HARD AT ALL

## 2022-12-12 ASSESSMENT — PATIENT HEALTH QUESTIONNAIRE - PHQ9
2. FEELING DOWN, DEPRESSED OR HOPELESS: 0
SUM OF ALL RESPONSES TO PHQ QUESTIONS 1-9: 0
SUM OF ALL RESPONSES TO PHQ QUESTIONS 1-9: 0
SUM OF ALL RESPONSES TO PHQ9 QUESTIONS 1 & 2: 0
SUM OF ALL RESPONSES TO PHQ QUESTIONS 1-9: 0
1. LITTLE INTEREST OR PLEASURE IN DOING THINGS: 0
SUM OF ALL RESPONSES TO PHQ QUESTIONS 1-9: 0

## 2022-12-12 ASSESSMENT — ENCOUNTER SYMPTOMS
GASTROINTESTINAL NEGATIVE: 1
RESPIRATORY NEGATIVE: 1

## 2022-12-12 NOTE — PROGRESS NOTES
Mickey Mendoza (:  1945) is a 77 y.o. male,New patient, here for evaluation of the following chief complaint(s):    Diabetes (New pt to establish care diabetes _ CHF he has a cardiologist Dr Howard)      SUBJECTIVE/OBJECTIVE:  HPI  New pt visit. A1C 6.5. He states he has a glucometer but only checks it occasionally. He does not monitor his diet but does not add salt to anything. He is wheelchair dependant, does not exercise. Denies vision disturbance, increased fatigue, increased urination, increased thirst.  New onset CHF via cardiology, Furosemide started. Denies CP, SOB. F/U with Cardiology in one month scheduled. Hypertension: Patient here for follow-up of elevated blood pressure. He is not exercising and is adherent to low salt diet. Blood pressure is well controlled at home. Cardiac symptoms none. Patient denies chest pain, palpitations, syncope, and tachypnea. Cardiovascular risk factors: dyslipidemia, hypertension, and male gender. Use of agents associated with hypertension: none. History of target organ damage: HTN, CHF. He denies any current c/o. Review of Systems   Constitutional: Negative. HENT: Negative. Respiratory: Negative. Cardiovascular: Negative. Gastrointestinal: Negative. Genitourinary: Negative. Physical Exam  Constitutional:       General: He is awake. Appearance: Normal appearance. HENT:      Head: Normocephalic. Cardiovascular:      Rate and Rhythm: Bradycardia present. Heart sounds: S1 normal and S2 normal. Heart sounds not distant. Murmur heard. No friction rub. No gallop. Pulmonary:      Breath sounds: Normal breath sounds and air entry. Musculoskeletal:      Cervical back: Full passive range of motion without pain. Neurological:      Mental Status: He is alert and oriented to person, place, and time. Mental status is at baseline.    Psychiatric:         Attention and Perception: Attention and perception normal. Mood and Affect: Mood and affect normal.         Speech: Speech normal.         Behavior: Behavior normal. Behavior is cooperative. Thought Content: Thought content normal.         Cognition and Memory: Cognition and memory normal.         Judgment: Judgment normal.     Juana Arango was seen today for diabetes. Diagnoses and all orders for this visit:    Type 2 diabetes mellitus without complication, unspecified whether long term insulin use (HCC)  -     POCT glycosylated hemoglobin (Hb A1C)  - Discussed glucose checks at home and appropriate DM diet     - Discussed making f/u appointment for annual exam after the new year.          --Ria Dove, APRN - CNP

## 2023-02-15 ENCOUNTER — OFFICE VISIT (OUTPATIENT)
Dept: FAMILY MEDICINE CLINIC | Age: 78
End: 2023-02-15

## 2023-02-15 VITALS
HEART RATE: 56 BPM | OXYGEN SATURATION: 95 % | BODY MASS INDEX: 29.27 KG/M2 | DIASTOLIC BLOOD PRESSURE: 68 MMHG | HEIGHT: 69 IN | SYSTOLIC BLOOD PRESSURE: 122 MMHG

## 2023-02-15 DIAGNOSIS — H61.22 IMPACTED CERUMEN OF LEFT EAR: ICD-10-CM

## 2023-02-15 DIAGNOSIS — Z00.00 INITIAL MEDICARE ANNUAL WELLNESS VISIT: Primary | ICD-10-CM

## 2023-02-15 DIAGNOSIS — Z13.1 DIABETES MELLITUS SCREENING: ICD-10-CM

## 2023-02-15 DIAGNOSIS — Z13.220 SCREENING FOR HYPERLIPIDEMIA: ICD-10-CM

## 2023-02-15 LAB
A/G RATIO: 1.7 (ref 1.1–2.2)
ALBUMIN SERPL-MCNC: 3.8 G/DL (ref 3.4–5)
ALP BLD-CCNC: 87 U/L (ref 40–129)
ALT SERPL-CCNC: 10 U/L (ref 10–40)
ANION GAP SERPL CALCULATED.3IONS-SCNC: 8 MMOL/L (ref 3–16)
AST SERPL-CCNC: 13 U/L (ref 15–37)
BILIRUB SERPL-MCNC: 0.3 MG/DL (ref 0–1)
BUN BLDV-MCNC: 24 MG/DL (ref 7–20)
CALCIUM SERPL-MCNC: 9.2 MG/DL (ref 8.3–10.6)
CHLORIDE BLD-SCNC: 106 MMOL/L (ref 99–110)
CHOLESTEROL, FASTING: 126 MG/DL (ref 0–199)
CO2: 29 MMOL/L (ref 21–32)
CREAT SERPL-MCNC: 1.2 MG/DL (ref 0.8–1.3)
GFR SERPL CREATININE-BSD FRML MDRD: >60 ML/MIN/{1.73_M2}
GLUCOSE BLD-MCNC: 62 MG/DL (ref 70–99)
HDLC SERPL-MCNC: 40 MG/DL (ref 40–60)
LDL CHOLESTEROL CALCULATED: 74 MG/DL
POTASSIUM SERPL-SCNC: 4.6 MMOL/L (ref 3.5–5.1)
SODIUM BLD-SCNC: 143 MMOL/L (ref 136–145)
TOTAL PROTEIN: 6 G/DL (ref 6.4–8.2)
TRIGLYCERIDE, FASTING: 62 MG/DL (ref 0–150)
VLDLC SERPL CALC-MCNC: 12 MG/DL

## 2023-02-15 SDOH — ECONOMIC STABILITY: INCOME INSECURITY: HOW HARD IS IT FOR YOU TO PAY FOR THE VERY BASICS LIKE FOOD, HOUSING, MEDICAL CARE, AND HEATING?: NOT HARD AT ALL

## 2023-02-15 SDOH — ECONOMIC STABILITY: HOUSING INSECURITY
IN THE LAST 12 MONTHS, WAS THERE A TIME WHEN YOU DID NOT HAVE A STEADY PLACE TO SLEEP OR SLEPT IN A SHELTER (INCLUDING NOW)?: NO

## 2023-02-15 SDOH — ECONOMIC STABILITY: FOOD INSECURITY: WITHIN THE PAST 12 MONTHS, YOU WORRIED THAT YOUR FOOD WOULD RUN OUT BEFORE YOU GOT MONEY TO BUY MORE.: NEVER TRUE

## 2023-02-15 SDOH — ECONOMIC STABILITY: FOOD INSECURITY: WITHIN THE PAST 12 MONTHS, THE FOOD YOU BOUGHT JUST DIDN'T LAST AND YOU DIDN'T HAVE MONEY TO GET MORE.: NEVER TRUE

## 2023-02-15 ASSESSMENT — PATIENT HEALTH QUESTIONNAIRE - PHQ9
SUM OF ALL RESPONSES TO PHQ QUESTIONS 1-9: 0
2. FEELING DOWN, DEPRESSED OR HOPELESS: 0
SUM OF ALL RESPONSES TO PHQ9 QUESTIONS 1 & 2: 0
1. LITTLE INTEREST OR PLEASURE IN DOING THINGS: 0
SUM OF ALL RESPONSES TO PHQ QUESTIONS 1-9: 0

## 2023-02-15 NOTE — PROGRESS NOTES
Medicare Annual Wellness Visit    Mattie Salvador is here for Medicare AWV         Recommendations for Preventive Services Due: see orders and patient instructions/AVS.  Recommended screening schedule for the next 5-10 years is provided to the patient in written form: see Patient Instructions/AVS.     Return in 1 year (on 2/15/2024) for Medicare Annual Wellness Visit in 1 year. Subjective   The following acute and/or chronic problems were also addressed today:    HTN- AFIB-CHF  WELL CONTROLLED ON CURRENT MEDICATIONS  CONTINUE LISINOPRIL= ELIQUIS  FOLLOW UP WITH DR GLASS    CHRONIC CERUM IMPACTION TO LEFT EAR. STATES HE GETS HIS EARS CLEANED [facility]AR[facility] BUT [facility] NOT [facility] LEFT EAR BECAUSE HE CAN NOT HEAR OUT OF IT. PT DENIES ANY OTHER C/O TODAY. Patient's complete Health Risk Assessment and screening values have been reviewed and are found in Flowsheets. The following problems were reviewed today and where indicated follow up appointments were made and/or referrals ordered. Positive Risk Factor Screenings with Interventions:      Cognitive: Words recalled: 2 Words Recalled   Clock Drawing Test (CDT): (!) Abnormal   Total Score: (!) 2   Total Score Interpretation: Abnormal Mini-Cog  PREVIOUS STROKE WITH LEFT SIDED DEFICITS - DIFFICULTY DRAWING AS WELL AS SOME COGNITIVE DEFICIENCIES. Dentist Screen:  Have you seen the dentist within the past year?: (!) No   Intervention:  HAS A NEW DENTIST - APPOINTMENT IN JUNE    Safety:  Do you have either shower bars, grab bars, non-slip mats or non-slip surfaces in your shower or bathtub?: (!) No   Interventions:  HAS ONE GRAB BAR AND HIS WIFE HELPS HIM IN AND OUT OF THE SHOWER                  Objective   Vitals:    02/15/23 0950   BP: 122/68   Site: Right Upper Arm   Position: Sitting   Cuff Size: Medium Adult   Pulse: 56   SpO2: 95%   Height: 5' 9\" (1.753 m)      Body mass index is 29.27 kg/m².         General Appearance: alert and oriented to person, place and time, well developed and well- nourished, in no acute distress  Skin: warm and dry, no rash or erythema  Head: normocephalic and atraumatic  Eyes: pupils equal, round, and reactive to light, extraocular eye movements intact, conjunctivae normal  ENT: tympanic membrane, external ear and ear canal normal bilaterally, nose without deformity, nasal mucosa and turbinates normal without polyps  Neck: supple and non-tender without mass, no thyromegaly or thyroid nodules, no cervical lymphadenopathy  Pulmonary/Chest: clear to auscultation bilaterally- no wheezes, rales or rhonchi, normal air movement, no respiratory distress  Cardiovascular: normal rate, regular rhythm, normal S1 and S2, no murmurs, rubs, clicks, or gallops, distal pulses intact, no carotid bruits  Abdomen: soft, non-tender, non-distended, normal bowel sounds, no masses or organomegaly  Extremities: no cyanosis, clubbing or edema         Allergies   Allergen Reactions    Other     Amoxicillin Other (See Comments)    Iodinated Contrast Media Other (See Comments)     Severe hypotension      Iodine     Iv Contrast [Iodides] Other (See Comments)     Severe hypotension    Ranitidine     Sulfa Antibiotics Hives    Chlordiazepoxide-Clidinium Rash     Prior to Visit Medications    Medication Sig Taking?  Authorizing Provider   carbamide peroxide (DEBROX) 6.5 % otic solution Place 5 drops in ear(s) 2 times daily Yes KRISTAL Segura - CNP   apixaban (ELIQUIS) 5 MG TABS tablet Take by mouth 2 times daily Yes Historical Provider, MD   omeprazole (PRILOSEC) 40 MG delayed release capsule Take 40 mg by mouth daily Yes Historical Provider, MD   dorzolamide-timolol (COSOPT) 22.3-6.8 MG/ML ophthalmic solution 1 drop 2 times daily Yes Historical Provider, MD   lisinopril (PRINIVIL;ZESTRIL) 10 MG tablet Take 10 mg by mouth daily  Yes Historical Provider, MD   acetaminophen (TYLENOL) 325 MG tablet Take 650 mg by mouth every 6 hours as needed for Pain Yes Historical Provider, MD   tamsulosin (FLOMAX) 0.4 MG capsule Take 0.4 mg by mouth nightly Yes Historical Provider, MD   insulin glargine (LANTUS) 100 UNIT/ML injection vial Inject 14 Units into the skin nightly Yes Historical Provider, MD   carboxymethylcellulose (THERATEARS) 1 % ophthalmic gel Place 1 drop into the left eye 3 times daily as needed for Dry Eyes Yes Historical Provider, MD   dicyclomine (BENTYL) 10 MG capsule Take 10 mg by mouth daily  Yes Historical Provider, MD   furosemide (LASIX) 20 MG tablet Take 20 mg by mouth daily  Yes Historical Provider, MD   pravastatin (PRAVACHOL) 80 MG tablet Take 40 mg by mouth nightly  Yes Historical Provider, MD [place] (Including outside providers/suppliers regularly involved in providing care):   Patient Care Team:  En Smith MD as PCP - General (Family Medicine)  Ashish Chiang. Chris Odom MD as Consulting Physician (Neurosurgery)    Denver Pompa was seen today for medicare awv. Diagnoses and all orders for this visit:    Initial Medicare annual wellness visit  Recommendations for Preventive Services Due: see orders and patient instructions/AVS.  Recommended screening schedule for the next 5-10 years is provided to the patient in written form: see Patient Instructions/AVS.   Return in 1 year (on 2/15/2024) for Medicare Annual Wellness Visit in 1 year.         Diabetes mellitus screening  Hga1c 6.5 12/12/22  Continue lantus as precribed  Comprehensive Metabolic Panel      Screening for hyperlipidemia  -     Lipid, Fasting    Impacted cerumen of left ear      -     carbamide peroxide (DEBROX) 6.5 % otic solution; Place 5 drops in ear(s) 2 times daily      KRISTAL Mcgee - CNP

## 2023-02-15 NOTE — PATIENT INSTRUCTIONS
Learning About Dental Care for Older Adults  Dental care for older adults: Overview  Dental care for older people is much the same as for younger adults. But older adults do have concerns that younger adults do not. Older adults may have problems with gum disease and decay on the roots of their teeth. They may need missing teeth replaced or broken fillings fixed. Or they may have dentures that need to be cared for. Some older adults may have trouble holding a toothbrush. You can help remind the person you are caring for to brush and floss their teeth or to clean their dentures. In some cases, you may need to do the brushing and other dental care tasks. People who have trouble using their hands or who have dementia may need this extra help. How can you help with dental care? Normal dental care  To keep the teeth and gums healthy:  Brush the teeth with fluoride toothpaste twice a day--in the morning and at night--and floss at least once a day. Plaque can quickly build up on the teeth of older adults. Watch for the signs of gum disease. These signs include gums that bleed after brushing or after eating hard foods, such as apples. See a dentist regularly. Many experts recommend checkups every 6 months. Keep the dentist up to date on any new medications the person is taking. Encourage a balanced diet that includes whole grains, vegetables, and fruits, and that is low in saturated fat and sodium. Encourage the person you're caring for not to use tobacco products. They can affect dental and general health. Many older adults have a fixed income and feel that they can't afford dental care. But most [facility] and [facility] have programs in which dentists help older adults by lowering fees. Contact your area's public health offices or  for information about dental care in your area.   Using a toothbrush  Older adults with arthritis sometimes have trouble brushing their teeth because they can't easily hold the toothbrush. Their hands and fingers may be stiff, painful, or weak. If this is the case, you can: Offer an electric toothbrush. Enlarge the handle of a non-electric toothbrush by wrapping a sponge, an elastic bandage, or adhesive tape around it. Push the toothbrush handle through a ball made of rubber or soft foam.  Make the handle longer and thicker by taping Popsicle sticks or tongue depressors to it. You may also be able to buy special toothbrushes, toothpaste dispensers, and floss holders. Your doctor may recommend a soft-bristle toothbrush if the person you care for bleeds easily. Bleeding can happen because of a health problem or from certain medicines. A toothpaste for sensitive teeth may help if the person you care for has sensitive teeth. How do you brush and floss someone's teeth? If the person you are caring for has a hard time cleaning their teeth on their own, you may need to brush and floss their teeth for them. It may be easiest to have the person sit and face away from you, and to sit or stand behind them. That way you can steady their head against your arm as you reach around to floss and brush their teeth. Choose a place that has good lighting and is comfortable for both of you. Before you begin, gather your supplies. You will need gloves, floss, a toothbrush, and a container to hold water if you are not near a sink. Wash and dry your hands well and put on gloves. Start by flossing:  Gently work a piece of floss between each of the teeth toward the gums. A plastic flossing tool may make this easier, and they are available at most [facility]. Curve the floss around each tooth into a U-shape and gently slide it under the gum line. Move the floss firmly up and down several times to scrape off the plaque. After you've finished flossing, throw away the used floss and begin brushing:  Wet the brush and apply toothpaste. Place the brush at a 45-degree angle where the teeth meet the gums. Press firmly, and move the brush in small circles over the surface of the teeth. Be careful not to brush too hard. Vigorous brushing can make the gums pull away from the teeth and can scratch the tooth enamel. Brush all surfaces of the teeth, on the tongue side and on the cheek side. Pay special attention to the front teeth and all surfaces of the back teeth. Brush chewing surfaces with short back-and-forth strokes. After you've finished, help the person rinse the remaining toothpaste from their mouth. Where can you learn more? Go to http://www.woods.com/ and enter F944 to learn more about \"Learning About Dental Care for Older Adults. \"  Current as of: June 16, 2022               Content Version: 13.5  © 2006-2022 Healthwise, [employer]. Care instructions adapted under license by [facility] . If you have questions about a medical condition or this instruction, always ask your healthcare professional. [place] [address] any warranty or liability for your use of this information. Advance Directives: Care Instructions  Overview  An advance directive is a legal way to state your wishes at the end of your life. It tells your family and your doctor what to do if you can't say what you want. There are two main types of advance directives. You can change them any time your wishes change. Living will. This form tells your family and your doctor your wishes about life support and other treatment. The form is also called a declaration. Medical power of . This form lets you name a person to make treatment decisions for you when you can't speak for yourself. This person is called a health care agent (health care proxy, health care surrogate). The form is also called a durable power of  for health care.   If you do not have an advance directive, decisions about your medical care may be made by a family member, or by a doctor or a  who doesn't know you.  It may help to think of an advance directive as a gift to the people who care for you. If you have one, they won't have to make tough decisions by themselves. For more information, including forms for your state, see the [address] website (www.caringinfo.org/planning/advance-directives/). Follow-up care is a key part of your treatment and safety. Be sure to make and go to all appointments, and call your doctor if you are having problems. It's also a good idea to know your test results and keep a list of the medicines you take. What should you include in an advance directive? Many states have a unique advance directive form. (It may ask you to address specific issues.) Or you might use a universal form that's approved by many states. If your form doesn't tell you what to address, it may be hard to know what to include in your advance directive. Use the questions below to help you get started. Who do you want to make decisions about your medical care if you are not able to? What life-support measures do you want if you have a serious illness that gets worse over time or can't be cured? What are you most afraid of that might happen? (Maybe you're afraid of having pain, losing your independence, or being kept alive by machines.)  Where would you prefer to die? (Your home? A hospital? A nursing home?)  Do you want to donate your organs when you die? Do you want certain Jainism practices performed before you die? When should you call for help? Be sure to contact your doctor if you have any questions. Where can you learn more? Go to http://www.vargas.com/ and enter R264 to learn more about \"Advance Directives: Care Instructions. \"  Current as of: June 16, 2022               Content Version: 13.5  © 8921-6721 Healthwise, [employer]. Care instructions adapted under license by [facility] ([facility]).  If you have questions about a medical condition or this instruction, always ask your healthcare professional. [address] any warranty or liability for your use of this information. A Healthy Heart: Care Instructions  Your Care Instructions     Coronary artery disease, also called heart disease, occurs when a substance called plaque builds up in the vessels that supply oxygen-rich blood to your heart muscle. This can narrow the blood vessels and reduce blood flow. A heart attack happens when blood flow is completely blocked. A high-fat diet, smoking, and other factors increase the risk of heart disease. Your doctor has found that you have a chance of having heart disease. You can do lots of things to keep your heart healthy. It may not be easy, but you can change your diet, exercise more, and quit smoking. These steps really work to lower your chance of heart disease. Follow-up care is a key part of your treatment and safety. Be sure to make and go to all appointments, and call your doctor if you are having problems. It's also a good idea to know your test results and keep a list of the medicines you take. How can you care for yourself at home? Diet    Use less salt when you cook and eat. This helps lower your blood pressure. Taste food before salting. Add only a little salt when you think you need it. With time, your taste buds will adjust to less salt.     Eat fewer snack items, fast foods, canned soups, and other high-salt, high-fat, processed foods.     Read food labels and try to avoid saturated and trans fats. They increase your risk of heart disease by raising cholesterol levels.     Limit the amount of solid fat-butter, margarine, and shortening-you eat. Use olive, peanut, or canola oil when you cook. Bake, broil, and steam foods instead of frying them.     Eat a variety of fruit and vegetables every day. Dark green, deep orange, red, or yellow fruits and vegetables are especially good for you.  Examples include spinach, carrots, peaches, and berries.     Foods high in fiber can reduce your cholesterol and provide important vitamins and minerals. High-fiber foods include whole-grain cereals and breads, oatmeal, beans, brown rice, citrus fruits, and apples.     Eat lean proteins. Heart-healthy proteins include seafood, lean meats and poultry, eggs, beans, peas, nuts, seeds, and soy products.     Limit drinks and foods with added sugar. These include candy, desserts, and soda pop. Lifestyle changes    If your doctor recommends it, get more exercise. Walking is a good choice. Bit by bit, increase the amount you walk every day. Try for at least 30 minutes on most days of the week. You also may want to swim, bike, or do other activities.     Do not smoke. If you need help quitting, talk to your doctor about stop-smoking programs and medicines. These can increase your chances of quitting for good. Quitting smoking may be the most important step you can take to protect your heart. It is never too late to quit.     Limit alcohol to 2 drinks a day for men and 1 drink a day for women. Too much alcohol can cause health problems.     Manage other health problems such as diabetes, high blood pressure, and high cholesterol. If you think you may have a problem with alcohol or drug use, talk to your doctor. Medicines    Take your medicines exactly as prescribed. Call your doctor if you think you are having a problem with your medicine.     If your doctor recommends aspirin, take the amount directed each day. Make sure you take aspirin and not another kind of pain reliever, such as acetaminophen (Tylenol). When should you call for help? Call 911 if you have symptoms of a heart attack.  These may include:    Chest pain or pressure, or a strange feeling in the chest.     Sweating.     Shortness of breath.     Pain, pressure, or a strange feeling in the back, neck, jaw, or upper belly or in one or both shoulders or arms.     Lightheadedness or sudden weakness.     A fast or irregular heartbeat. After you call 911, the  may tell you to chew 1 adult-strength or 2 to 4 low-dose aspirin. Wait for an ambulance. Do not try to drive yourself. Watch closely for changes in your health, and be sure to contact your doctor if you have any problems. Where can you learn more? Go to http://www.vargas.com/ and enter F075 to learn more about \"A Healthy Heart: Care Instructions. \"  Current as of: September 7, 2022               Content Version: 13.5  © 3591-0103 Healthwise, [employer]. Care instructions adapted under license by [facility] ([facility]). If you have questions about a medical condition or this instruction, always ask your healthcare professional. [address] any warranty or liability for your use of this information. Personalized Preventive Plan for Allyne Men - 2/15/2023  Medicare offers a range of preventive health benefits. Some of the tests and screenings are paid in full while other may be subject to a deductible, co-insurance, and/or copay. Some of these benefits include a comprehensive review of your medical history including lifestyle, illnesses that may run in your family, and various assessments and screenings as appropriate. After reviewing your medical record and screening and assessments performed today your provider may have ordered immunizations, labs, imaging, and/or referrals for you. A list of these orders (if applicable) as well as your Preventive Care list are included within your After Visit Summary for your review. Other Preventive Recommendations:    A preventive eye exam performed by an eye specialist is recommended every 1-2 years to screen for glaucoma; cataracts, macular degeneration, and other eye disorders. A preventive dental visit is recommended every 6 months. Try to get at least 150 minutes of exercise per week or 10,000 steps per day on a pedometer .   Order or download the FREE \"Exercise & Physical Activity: Your Everyday Guide\" from [employer]. Call 3-794.432.2996 or search [employer] online. You need 0241-6473 mg of calcium and 2978-3898 IU of vitamin D per day. It is possible to meet your calcium requirement with diet alone, but a vitamin D supplement is usually necessary to meet this goal.  When exposed to the sun, use a sunscreen that protects against both UVA and UVB radiation with an SPF of 30 or greater. Reapply every 2 to 3 hours or after sweating, drying off with a towel, or swimming. Always wear a seat belt when traveling in a car. Always wear a helmet when riding a bicycle or motorcycle.

## 2023-02-27 ENCOUNTER — NURSE ONLY (OUTPATIENT)
Dept: FAMILY MEDICINE CLINIC | Age: 78
End: 2023-02-27

## 2023-02-27 VITALS — BODY MASS INDEX: 29.27 KG/M2 | HEIGHT: 69 IN

## 2023-02-27 DIAGNOSIS — H61.22 IMPACTED CERUMEN OF LEFT EAR: Primary | ICD-10-CM

## 2023-03-23 ENCOUNTER — TELEPHONE (OUTPATIENT)
Dept: FAMILY MEDICINE CLINIC | Age: 78
End: 2023-03-23

## 2023-03-23 RX ORDER — OMEPRAZOLE 40 MG/1
40 CAPSULE, DELAYED RELEASE ORAL DAILY
Qty: 90 CAPSULE | Refills: 3 | Status: SHIPPED | OUTPATIENT
Start: 2023-03-23

## 2023-03-23 NOTE — TELEPHONE ENCOUNTER
Patient is requesting a refill on his omeprazole       [facility] [address] [facility], [address] [place] [address] Drive - F [phone]

## 2023-05-15 ENCOUNTER — TELEPHONE (OUTPATIENT)
Dept: FAMILY MEDICINE CLINIC | Age: 78
End: 2023-05-15

## 2023-05-15 NOTE — TELEPHONE ENCOUNTER
----- Message from John Mcclellan sent at 5/15/2023  9:31 AM EDT -----  Subject: Message to Provider    QUESTIONS  Information for Provider? Patient was discharged yesterday from the rehab   facility and put on another blood thinner , Henrey Fregia the patients daughter is   questioning if he should be taking more than one of these , she would like   to have a call back asap to advise.   ---------------------------------------------------------------------------  --------------  Elias CORTEZ  [phone]; OK to leave message on voicemail  ---------------------------------------------------------------------------  --------------  SCRIPT ANSWERS  Relationship to Patient? Other/Third Party  Representative Name? Shiloh Pollard  Is the representative on the Communication Release of Information (MARICEL)   form in Epic?  Yes

## 2023-05-17 ENCOUNTER — OFFICE VISIT (OUTPATIENT)
Dept: FAMILY MEDICINE CLINIC | Age: 78
End: 2023-05-17
Payer: MEDICARE

## 2023-05-17 VITALS
BODY MASS INDEX: 30.36 KG/M2 | HEART RATE: 80 BPM | SYSTOLIC BLOOD PRESSURE: 132 MMHG | DIASTOLIC BLOOD PRESSURE: 88 MMHG | HEIGHT: 69 IN | WEIGHT: 205 LBS | OXYGEN SATURATION: 95 %

## 2023-05-17 DIAGNOSIS — I48.11 LONGSTANDING PERSISTENT ATRIAL FIBRILLATION (HCC): ICD-10-CM

## 2023-05-17 DIAGNOSIS — M25.561 CHRONIC PAIN OF RIGHT KNEE: Primary | ICD-10-CM

## 2023-05-17 DIAGNOSIS — I49.5 SSS (SICK SINUS SYNDROME) (HCC): ICD-10-CM

## 2023-05-17 DIAGNOSIS — E11.9 TYPE 2 DIABETES MELLITUS WITHOUT COMPLICATION, UNSPECIFIED WHETHER LONG TERM INSULIN USE (HCC): ICD-10-CM

## 2023-05-17 DIAGNOSIS — S82.001D CLOSED NONDISPLACED FRACTURE OF RIGHT PATELLA WITH ROUTINE HEALING, UNSPECIFIED FRACTURE MORPHOLOGY, SUBSEQUENT ENCOUNTER: ICD-10-CM

## 2023-05-17 DIAGNOSIS — G89.29 CHRONIC PAIN OF RIGHT KNEE: Primary | ICD-10-CM

## 2023-05-17 DIAGNOSIS — I10 PRIMARY HYPERTENSION: ICD-10-CM

## 2023-05-17 DIAGNOSIS — T14.8XXA HEMATOMA: ICD-10-CM

## 2023-05-17 PROCEDURE — 99213 OFFICE O/P EST LOW 20 MIN: CPT | Performed by: NURSE PRACTITIONER

## 2023-05-17 PROCEDURE — 1123F ACP DISCUSS/DSCN MKR DOCD: CPT | Performed by: NURSE PRACTITIONER

## 2023-05-17 PROCEDURE — 3078F DIAST BP <80 MM HG: CPT | Performed by: NURSE PRACTITIONER

## 2023-05-17 PROCEDURE — 3074F SYST BP LT 130 MM HG: CPT | Performed by: NURSE PRACTITIONER

## 2023-05-17 NOTE — PROGRESS NOTES
Logan Duran (:  1945) is a 69 y.o. male,Established patient, here for evaluation of the following chief complaint(s):    Chief Complaint   Patient presents with    Hypertension     B/p was low in the hospital one time he has been fine since then- has not seen his cardiologist yet    Shante Kasper had a fall at home - fractured right knee - was in the NH for rehab        SUBJECTIVE/OBJECTIVE:  HPI  he has chronic right knee pain ha s had several cortisone injection that used to help but the last two did not help so much   Mr Wilfrid Mendoza had  a fall   while getting gout if the car  his right knee gave out and  he fell his this knee - hip and head - he did not lose consciousness- but was  unable to stand on his own they had to call the squad  - he then went to    He  has resumed his elquis - b/p  meds  he denies lightheadedness      Review of Systems   Musculoskeletal:         Right knee pain     Physical Exam  Vitals reviewed. Constitutional:       General: He is awake. He is not in acute distress. Appearance: Normal appearance. He is well-developed and well-groomed. He is not ill-appearing, toxic-appearing or diaphoretic. Comments: Wheelchair - stand by assisit   Cardiovascular:      Rate and Rhythm: Normal rate and regular rhythm. Heart sounds: Normal heart sounds, S1 normal and S2 normal.   Pulmonary:      Effort: Pulmonary effort is normal.      Breath sounds: Normal breath sounds and air entry. Neurological:      Mental Status: He is alert and oriented to person, place, and time. Cranial Nerves: Facial asymmetry present. Comments: S/p brain tumor   Psychiatric:         Attention and Perception: Attention and perception normal.         Mood and Affect: Mood and affect normal.         Speech: Speech is slurred. Behavior: Behavior normal. Behavior is cooperative. Thought Content:  Thought content normal.         Cognition and Memory: Cognition and memory

## 2023-05-17 NOTE — PATIENT INSTRUCTIONS
You may receive a survey regarding the care you received during your visit. Your input is valuable to us. We encourage you to complete and return your survey. We hope you will choose us in the future for your healthcare needs. GENERAL OFFICE POLICIES      Telephone Calls: Messages will be answered within 1-2 business days, unless the provider is out of the office. If it is urgent a covering provider will answer. (this does not include Medication refills). MyChart: We recommend all patients sign up for [facility]. Through this portal you can see your lab results, request refills, schedule appointments, pay your bill and send messages to the office. [employer] messages will be answered within 1-2 business days unless the provider is out of the office. For urgent matters, please call the office. Appointments:  All appointments must be scheduled. We ask all patients to schedule their next follow up appointment before they leave the office to make sure you will be able to be seen before you run out of medications. 24 hours notice is required to cancel or reschedule an appointment to avoid being marked as a no show. You may be dismissed from the practice after 3 no shows. LATE for Appointment: If you are 15 or more minutes late for your appointment, you may be asked to reschedule. MA/LAB APPTS: Must be scheduled, cannot accept walk in lab visits. We only draw labs for patients established in our office. We only do injections for medications ordered by our office. Acute Sick Visits:  Nothing other than acute complaint will be addressed at this visit. TRADITIONAL MEDICARE  DOES NOT COVER PHYSICALS  MEDICARE WELLNESS VISITS: These are NOT physicals but the free annual visit offered by Medicare to discuss wellness issues. Medication refills, checkups, etc. will not be addressed during this visit.   Medication Refills: Refills are handled electronically so please contact your pharmacy for medication refills

## 2023-06-06 ENCOUNTER — TELEPHONE (OUTPATIENT)
Dept: FAMILY MEDICINE CLINIC | Age: 78
End: 2023-06-06

## 2023-06-19 RX ORDER — ACETAMINOPHEN 325 MG/1
TABLET ORAL
Qty: 30 TABLET | Refills: 50 | Status: SHIPPED | OUTPATIENT
Start: 2023-06-19

## 2023-06-23 ENCOUNTER — TELEPHONE (OUTPATIENT)
Dept: FAMILY MEDICINE CLINIC | Age: 78
End: 2023-06-23

## 2023-07-04 RX ORDER — NITROFURANTOIN 25; 75 MG/1; MG/1
100 CAPSULE ORAL 2 TIMES DAILY
Qty: 14 CAPSULE | Refills: 0 | Status: SHIPPED | OUTPATIENT
Start: 2023-07-04 | End: 2023-07-11

## 2023-07-17 RX ORDER — OMEPRAZOLE 40 MG/1
CAPSULE, DELAYED RELEASE ORAL
Qty: 30 CAPSULE | Refills: 3 | Status: SHIPPED | OUTPATIENT
Start: 2023-07-17

## 2023-07-17 RX ORDER — LISINOPRIL 10 MG/1
TABLET ORAL
Qty: 30 TABLET | Refills: 3 | Status: SHIPPED | OUTPATIENT
Start: 2023-07-17

## 2023-08-07 RX ORDER — APIXABAN 5 MG/1
TABLET, FILM COATED ORAL
Qty: 32 TABLET | Refills: 3 | Status: SHIPPED | OUTPATIENT
Start: 2023-08-07

## 2024-02-05 RX ORDER — APIXABAN 5 MG/1
TABLET, FILM COATED ORAL
Qty: 60 TABLET | Refills: 0 | Status: SHIPPED | OUTPATIENT
Start: 2024-02-05

## 2024-04-19 NOTE — PROGRESS NOTES
[address]  time__0730__________        Surgery time____0900________    Take the following medications with a sip of water: per md instructions     Do not eat or drink anything after 12:00 midnight prior to your surgery. This includes water chewing gum, mints and ice chips. You may brush your teeth and gargle the morning of your surgery, but do not swallow the water     Please see your family doctor/pediatrician for a history and physical and/or concerning medications. H&P needs an appointment can not get into PCP until February left a message with Ariellarupa Joe to see if can get him into [facility]    Bring any test results/reports from your physicians office. If you are under the care of a heart doctor or specialist doctor, please be aware that you may be asked to them for clearance    You may be asked to stop blood thinners such as Coumadin, Plavix, Fragmin, Lovenox, etc., or any anti-inflammatories such as:  Aspirin, Ibuprofen, Advil, Naproxen prior to your surgery. We also ask that you stop any OTC medications such as fish oil, vitamin E, glucosamine, garlic, Multivitamins, COQ 10, etc.    We ask that you do not smoke 24 hours prior to surgery  We ask that you do not  drink any alcoholic beverages 24 hours prior to surgery     You must make arrangements for a responsible adult to take you home after your surgery. For your safety you will not be allowed to leave alone or drive yourself home. Your surgery will be cancelled if you do not have a ride home. Also for your safety, it is strongly suggested that someone stay with you the first 24 hours after your surgery. A parent or legal guardian must accompany a child scheduled for surgery and plan to stay at the hospital until the child is discharged. Please do not bring other children with you. For your comfort, please wear simple loose fitting clothing to the hospital.  Please do not bring valuables. Received signed and completed form from Physician's Office.    Called patient-LVM-told her the form was completed and available for      Wear short sleeve button down shirt or loose shirt and bring eye drops or eye ointment. Do not wear any make-up or nail polish on your fingers or toes      For your safety, please do not wear any jewelry or body piercing's on the day of surgery. All jewelry must be removed. If you have dentures, they will be removed before going to operating room. For your convenience, we will provide you with a container. If you wear contact lenses or glasses, they will be removed, please bring a case for them. If you have a living will and a durable power of  for healthcare, please bring in a copy. As part of our patient safety program to minimize surgical site infections, we ask you to do the following:    · Please notify your surgeon if you develop any illness between         now and the  day of your surgery. · This includes a cough, cold, fever, sore throat, nausea,         or vomiting, and diarrhea, etc.  ·  Please notify your surgeon if you experience dizziness, shortness         of breath or blurred vision between now and the time of your surgery. Do not shave your operative site 96 hours prior to surgery. For face and neck surgery, men may use an electric razor 48 hours   prior to surgery. You may shower the night before surgery or the morning of   your surgery with an antibacterial soap. You will need to bring a photo ID and insurance card    [facility] has an onsite pharmacy, would you like to utilize our pharmacy     If you will be staying overnight and use a C-pap machine, please bring   your C-pap to hospital     Our goal is to provide you with excellent care, therefore, visitors will be limited to two(2) in the room at a time so that we may focus on providing this care for you. Please contact pre-admission testing if you have any further questions.                  [facility] phone number:  [phone]  Please note these are generalized instructions for all surgical cases, you may be provided with more specific instructions according to your surgery.

## 2024-06-05 ENCOUNTER — APPOINTMENT (OUTPATIENT)
Dept: CT IMAGING | Age: 79
DRG: 871 | End: 2024-06-05
Payer: MEDICARE

## 2024-06-05 ENCOUNTER — HOSPITAL ENCOUNTER (INPATIENT)
Age: 79
LOS: 5 days | Discharge: HOME | DRG: 871 | End: 2024-06-10
Attending: EMERGENCY MEDICINE | Admitting: STUDENT IN AN ORGANIZED HEALTH CARE EDUCATION/TRAINING PROGRAM
Payer: MEDICARE

## 2024-06-05 ENCOUNTER — APPOINTMENT (OUTPATIENT)
Dept: GENERAL RADIOLOGY | Age: 79
DRG: 871 | End: 2024-06-05
Payer: MEDICARE

## 2024-06-05 DIAGNOSIS — J96.01 ACUTE RESPIRATORY FAILURE WITH HYPOXIA (HCC): ICD-10-CM

## 2024-06-05 DIAGNOSIS — R79.89 ELEVATED TROPONIN: ICD-10-CM

## 2024-06-05 DIAGNOSIS — E16.2 HYPOGLYCEMIA: ICD-10-CM

## 2024-06-05 DIAGNOSIS — A41.9 SEPTICEMIA (HCC): Primary | ICD-10-CM

## 2024-06-05 PROBLEM — J15.9 BACTERIAL PNEUMONIA: Status: ACTIVE | Noted: 2024-06-05

## 2024-06-05 LAB
ALBUMIN SERPL-MCNC: 3.7 G/DL (ref 3.4–5)
ALBUMIN/GLOB SERPL: 1.1 {RATIO} (ref 1.1–2.2)
ALP SERPL-CCNC: 141 U/L (ref 40–129)
ALT SERPL-CCNC: 22 U/L (ref 10–40)
ANION GAP SERPL CALCULATED.3IONS-SCNC: 14 MMOL/L (ref 3–16)
AST SERPL-CCNC: 26 U/L (ref 15–37)
BACTERIA URNS QL MICRO: NORMAL /HPF
BASE EXCESS BLDV CALC-SCNC: 3.7 MMOL/L
BASOPHILS # BLD: 0.1 K/UL (ref 0–0.2)
BASOPHILS NFR BLD: 0.4 %
BILIRUB SERPL-MCNC: 0.8 MG/DL (ref 0–1)
BILIRUB UR QL STRIP.AUTO: NEGATIVE
BUN SERPL-MCNC: 21 MG/DL (ref 7–20)
CALCIUM SERPL-MCNC: 9.4 MG/DL (ref 8.3–10.6)
CHLORIDE SERPL-SCNC: 101 MMOL/L (ref 99–110)
CLARITY UR: CLEAR
CO2 BLDV-SCNC: 33 MMOL/L
CO2 SERPL-SCNC: 25 MMOL/L (ref 21–32)
COHGB MFR BLDV: 1.7 %
COLOR UR: YELLOW
CREAT SERPL-MCNC: 1.2 MG/DL (ref 0.8–1.3)
DEPRECATED RDW RBC AUTO: 13.6 % (ref 12.4–15.4)
EOSINOPHIL # BLD: 0 K/UL (ref 0–0.6)
EOSINOPHIL NFR BLD: 0.3 %
EPI CELLS #/AREA URNS AUTO: 1 /HPF (ref 0–5)
FLUAV RNA UPPER RESP QL NAA+PROBE: NEGATIVE
FLUBV AG NPH QL: NEGATIVE
GFR SERPLBLD CREATININE-BSD FMLA CKD-EPI: 62 ML/MIN/{1.73_M2}
GLUCOSE BLD-MCNC: 128 MG/DL (ref 70–99)
GLUCOSE BLD-MCNC: 208 MG/DL (ref 70–99)
GLUCOSE BLD-MCNC: 42 MG/DL (ref 70–99)
GLUCOSE BLD-MCNC: 53 MG/DL (ref 70–99)
GLUCOSE BLD-MCNC: 55 MG/DL (ref 70–99)
GLUCOSE BLD-MCNC: 68 MG/DL (ref 70–99)
GLUCOSE BLD-MCNC: 75 MG/DL (ref 70–99)
GLUCOSE BLD-MCNC: 84 MG/DL (ref 70–99)
GLUCOSE SERPL-MCNC: 44 MG/DL (ref 70–99)
GLUCOSE UR STRIP.AUTO-MCNC: NEGATIVE MG/DL
HCO3 BLDV-SCNC: 31 MMOL/L (ref 23–29)
HCT VFR BLD AUTO: 41.3 % (ref 40.5–52.5)
HGB BLD-MCNC: 13.6 G/DL (ref 13.5–17.5)
HYALINE CASTS #/AREA URNS AUTO: 2 /LPF (ref 0–8)
KETONES UR STRIP.AUTO-MCNC: NEGATIVE MG/DL
LACTATE BLDV-SCNC: 3.2 MMOL/L (ref 0.4–2)
LEUKOCYTE ESTERASE UR QL STRIP.AUTO: NEGATIVE
LYMPHOCYTES # BLD: 1.5 K/UL (ref 1–5.1)
LYMPHOCYTES NFR BLD: 9 %
MCH RBC QN AUTO: 27.8 PG (ref 26–34)
MCHC RBC AUTO-ENTMCNC: 32.9 G/DL (ref 31–36)
MCV RBC AUTO: 84.5 FL (ref 80–100)
METHGB MFR BLDV: 1.4 %
MONOCYTES # BLD: 1 K/UL (ref 0–1.3)
MONOCYTES NFR BLD: 6 %
NEUTROPHILS # BLD: 13.7 K/UL (ref 1.7–7.7)
NEUTROPHILS NFR BLD: 84.3 %
NITRITE UR QL STRIP.AUTO: NEGATIVE
NT-PROBNP SERPL-MCNC: 3462 PG/ML (ref 0–449)
O2 THERAPY: ABNORMAL
PCO2 BLDV: 56.9 MMHG (ref 40–50)
PERFORMED ON: ABNORMAL
PERFORMED ON: NORMAL
PERFORMED ON: NORMAL
PH BLDV: 7.34 [PH] (ref 7.35–7.45)
PH UR STRIP.AUTO: 6.5 [PH] (ref 5–8)
PLATELET # BLD AUTO: 179 K/UL (ref 135–450)
PMV BLD AUTO: 9.2 FL (ref 5–10.5)
PO2 BLDV: <30 MMHG
POTASSIUM SERPL-SCNC: 4 MMOL/L (ref 3.5–5.1)
PROCALCITONIN SERPL IA-MCNC: 0.27 NG/ML (ref 0–0.15)
PROT SERPL-MCNC: 7 G/DL (ref 6.4–8.2)
PROT UR STRIP.AUTO-MCNC: 300 MG/DL
RBC # BLD AUTO: 4.89 M/UL (ref 4.2–5.9)
RBC CLUMPS #/AREA URNS AUTO: 0 /HPF (ref 0–4)
SAO2 % BLDV: 42 %
SARS-COV-2 RDRP RESP QL NAA+PROBE: NOT DETECTED
SODIUM SERPL-SCNC: 140 MMOL/L (ref 136–145)
SP GR UR STRIP.AUTO: 1.01 (ref 1–1.03)
TROPONIN, HIGH SENSITIVITY: 40 NG/L (ref 0–22)
TROPONIN, HIGH SENSITIVITY: 43 NG/L (ref 0–22)
UA COMPLETE W REFLEX CULTURE PNL UR: NORMAL
UA DIPSTICK W REFLEX MICRO PNL UR: YES
URN SPEC COLLECT METH UR: NORMAL
UROBILINOGEN UR STRIP-ACNC: 1 E.U./DL
WBC # BLD AUTO: 16.3 K/UL (ref 4–11)
WBC #/AREA URNS AUTO: 5 /HPF (ref 0–5)

## 2024-06-05 PROCEDURE — 96361 HYDRATE IV INFUSION ADD-ON: CPT

## 2024-06-05 PROCEDURE — 87040 BLOOD CULTURE FOR BACTERIA: CPT

## 2024-06-05 PROCEDURE — 87635 SARS-COV-2 COVID-19 AMP PRB: CPT

## 2024-06-05 PROCEDURE — 2500000003 HC RX 250 WO HCPCS

## 2024-06-05 PROCEDURE — 6370000000 HC RX 637 (ALT 250 FOR IP): Performed by: STUDENT IN AN ORGANIZED HEALTH CARE EDUCATION/TRAINING PROGRAM

## 2024-06-05 PROCEDURE — 96365 THER/PROPH/DIAG IV INF INIT: CPT

## 2024-06-05 PROCEDURE — 71045 X-RAY EXAM CHEST 1 VIEW: CPT

## 2024-06-05 PROCEDURE — 96376 TX/PRO/DX INJ SAME DRUG ADON: CPT

## 2024-06-05 PROCEDURE — 96375 TX/PRO/DX INJ NEW DRUG ADDON: CPT

## 2024-06-05 PROCEDURE — 96367 TX/PROPH/DG ADDL SEQ IV INF: CPT

## 2024-06-05 PROCEDURE — 87150 DNA/RNA AMPLIFIED PROBE: CPT

## 2024-06-05 PROCEDURE — 1200000000 HC SEMI PRIVATE

## 2024-06-05 PROCEDURE — 93005 ELECTROCARDIOGRAM TRACING: CPT

## 2024-06-05 PROCEDURE — 84484 ASSAY OF TROPONIN QUANT: CPT

## 2024-06-05 PROCEDURE — 87804 INFLUENZA ASSAY W/OPTIC: CPT

## 2024-06-05 PROCEDURE — 84145 PROCALCITONIN (PCT): CPT

## 2024-06-05 PROCEDURE — 85025 COMPLETE CBC W/AUTO DIFF WBC: CPT

## 2024-06-05 PROCEDURE — 70450 CT HEAD/BRAIN W/O DYE: CPT

## 2024-06-05 PROCEDURE — 83605 ASSAY OF LACTIC ACID: CPT

## 2024-06-05 PROCEDURE — 2580000003 HC RX 258

## 2024-06-05 PROCEDURE — 6360000002 HC RX W HCPCS

## 2024-06-05 PROCEDURE — 6360000002 HC RX W HCPCS: Performed by: STUDENT IN AN ORGANIZED HEALTH CARE EDUCATION/TRAINING PROGRAM

## 2024-06-05 PROCEDURE — 81001 URINALYSIS AUTO W/SCOPE: CPT

## 2024-06-05 PROCEDURE — 80053 COMPREHEN METABOLIC PANEL: CPT

## 2024-06-05 PROCEDURE — 83880 ASSAY OF NATRIURETIC PEPTIDE: CPT

## 2024-06-05 PROCEDURE — 82803 BLOOD GASES ANY COMBINATION: CPT

## 2024-06-05 PROCEDURE — 74176 CT ABD & PELVIS W/O CONTRAST: CPT

## 2024-06-05 PROCEDURE — 2580000003 HC RX 258: Performed by: STUDENT IN AN ORGANIZED HEALTH CARE EDUCATION/TRAINING PROGRAM

## 2024-06-05 PROCEDURE — 99285 EMERGENCY DEPT VISIT HI MDM: CPT

## 2024-06-05 RX ORDER — FUROSEMIDE 20 MG/1
20 TABLET ORAL DAILY
Status: DISCONTINUED | OUTPATIENT
Start: 2024-06-05 | End: 2024-06-10 | Stop reason: HOSPADM

## 2024-06-05 RX ORDER — CALCIUM CARB/VITAMIN D3/VIT K1 500-100-40
1 TABLET,CHEWABLE ORAL 2 TIMES DAILY
Status: DISCONTINUED | OUTPATIENT
Start: 2024-06-05 | End: 2024-06-10 | Stop reason: HOSPADM

## 2024-06-05 RX ORDER — POLYETHYLENE GLYCOL 3350 17 G/17G
17 POWDER, FOR SOLUTION ORAL DAILY PRN
Status: DISCONTINUED | OUTPATIENT
Start: 2024-06-05 | End: 2024-06-10 | Stop reason: HOSPADM

## 2024-06-05 RX ORDER — DIPHENHYDRAMINE HYDROCHLORIDE 50 MG/ML
25 INJECTION INTRAMUSCULAR; INTRAVENOUS ONCE
Status: COMPLETED | OUTPATIENT
Start: 2024-06-05 | End: 2024-06-05

## 2024-06-05 RX ORDER — POTASSIUM CHLORIDE 7.45 MG/ML
10 INJECTION INTRAVENOUS PRN
Status: DISCONTINUED | OUTPATIENT
Start: 2024-06-05 | End: 2024-06-10 | Stop reason: HOSPADM

## 2024-06-05 RX ORDER — DEXTROSE MONOHYDRATE 100 MG/ML
INJECTION, SOLUTION INTRAVENOUS CONTINUOUS PRN
Status: DISCONTINUED | OUTPATIENT
Start: 2024-06-05 | End: 2024-06-10 | Stop reason: HOSPADM

## 2024-06-05 RX ORDER — ACETAMINOPHEN 650 MG/1
650 SUPPOSITORY RECTAL EVERY 6 HOURS PRN
Status: DISCONTINUED | OUTPATIENT
Start: 2024-06-05 | End: 2024-06-10 | Stop reason: HOSPADM

## 2024-06-05 RX ORDER — GLUCAGON 1 MG/ML
1 KIT INJECTION PRN
Status: DISCONTINUED | OUTPATIENT
Start: 2024-06-05 | End: 2024-06-10 | Stop reason: HOSPADM

## 2024-06-05 RX ORDER — 0.9 % SODIUM CHLORIDE 0.9 %
500 INTRAVENOUS SOLUTION INTRAVENOUS ONCE
Status: COMPLETED | OUTPATIENT
Start: 2024-06-05 | End: 2024-06-05

## 2024-06-05 RX ORDER — ACETAMINOPHEN 325 MG/1
650 TABLET ORAL EVERY 6 HOURS PRN
Status: DISCONTINUED | OUTPATIENT
Start: 2024-06-05 | End: 2024-06-10 | Stop reason: HOSPADM

## 2024-06-05 RX ORDER — SODIUM CHLORIDE 0.9 % (FLUSH) 0.9 %
5-40 SYRINGE (ML) INJECTION PRN
Status: DISCONTINUED | OUTPATIENT
Start: 2024-06-05 | End: 2024-06-10 | Stop reason: HOSPADM

## 2024-06-05 RX ORDER — ONDANSETRON 4 MG/1
4 TABLET, ORALLY DISINTEGRATING ORAL EVERY 8 HOURS PRN
Status: DISCONTINUED | OUTPATIENT
Start: 2024-06-05 | End: 2024-06-10 | Stop reason: HOSPADM

## 2024-06-05 RX ORDER — TAMSULOSIN HYDROCHLORIDE 0.4 MG/1
0.4 CAPSULE ORAL NIGHTLY
Status: DISCONTINUED | OUTPATIENT
Start: 2024-06-05 | End: 2024-06-10 | Stop reason: HOSPADM

## 2024-06-05 RX ORDER — INSULIN LISPRO 100 [IU]/ML
0-4 INJECTION, SOLUTION INTRAVENOUS; SUBCUTANEOUS NIGHTLY
Status: DISCONTINUED | OUTPATIENT
Start: 2024-06-05 | End: 2024-06-10 | Stop reason: HOSPADM

## 2024-06-05 RX ORDER — SODIUM CHLORIDE 9 MG/ML
INJECTION, SOLUTION INTRAVENOUS PRN
Status: DISCONTINUED | OUTPATIENT
Start: 2024-06-05 | End: 2024-06-10 | Stop reason: HOSPADM

## 2024-06-05 RX ORDER — DORZOLAMIDE HYDROCHLORIDE AND TIMOLOL MALEATE 20; 5 MG/ML; MG/ML
1 SOLUTION/ DROPS OPHTHALMIC 2 TIMES DAILY
Status: DISCONTINUED | OUTPATIENT
Start: 2024-06-05 | End: 2024-06-10 | Stop reason: HOSPADM

## 2024-06-05 RX ORDER — PRAVASTATIN SODIUM 40 MG
40 TABLET ORAL NIGHTLY
Status: DISCONTINUED | OUTPATIENT
Start: 2024-06-05 | End: 2024-06-10 | Stop reason: HOSPADM

## 2024-06-05 RX ORDER — DEXTROSE MONOHYDRATE 25 G/50ML
25 INJECTION, SOLUTION INTRAVENOUS ONCE
Status: COMPLETED | OUTPATIENT
Start: 2024-06-05 | End: 2024-06-05

## 2024-06-05 RX ORDER — PANTOPRAZOLE SODIUM 40 MG/1
40 TABLET, DELAYED RELEASE ORAL
Status: DISCONTINUED | OUTPATIENT
Start: 2024-06-06 | End: 2024-06-10 | Stop reason: HOSPADM

## 2024-06-05 RX ORDER — ONDANSETRON 2 MG/ML
4 INJECTION INTRAMUSCULAR; INTRAVENOUS EVERY 6 HOURS PRN
Status: DISCONTINUED | OUTPATIENT
Start: 2024-06-05 | End: 2024-06-10 | Stop reason: HOSPADM

## 2024-06-05 RX ORDER — ENOXAPARIN SODIUM 100 MG/ML
40 INJECTION SUBCUTANEOUS DAILY
Status: DISCONTINUED | OUTPATIENT
Start: 2024-06-05 | End: 2024-06-05

## 2024-06-05 RX ORDER — POTASSIUM CHLORIDE 20 MEQ/1
40 TABLET, EXTENDED RELEASE ORAL PRN
Status: DISCONTINUED | OUTPATIENT
Start: 2024-06-05 | End: 2024-06-10 | Stop reason: HOSPADM

## 2024-06-05 RX ORDER — MAGNESIUM SULFATE IN WATER 40 MG/ML
2000 INJECTION, SOLUTION INTRAVENOUS PRN
Status: DISCONTINUED | OUTPATIENT
Start: 2024-06-05 | End: 2024-06-10 | Stop reason: HOSPADM

## 2024-06-05 RX ORDER — SODIUM CHLORIDE 0.9 % (FLUSH) 0.9 %
5-40 SYRINGE (ML) INJECTION EVERY 12 HOURS SCHEDULED
Status: DISCONTINUED | OUTPATIENT
Start: 2024-06-05 | End: 2024-06-10 | Stop reason: HOSPADM

## 2024-06-05 RX ORDER — INSULIN LISPRO 100 [IU]/ML
0-4 INJECTION, SOLUTION INTRAVENOUS; SUBCUTANEOUS
Status: DISCONTINUED | OUTPATIENT
Start: 2024-06-05 | End: 2024-06-10 | Stop reason: HOSPADM

## 2024-06-05 RX ADMIN — FUROSEMIDE 20 MG: 20 TABLET ORAL at 18:05

## 2024-06-05 RX ADMIN — DEXTROSE MONOHYDRATE 25 G: 25 INJECTION, SOLUTION INTRAVENOUS at 11:33

## 2024-06-05 RX ADMIN — PRAVASTATIN SODIUM 40 MG: 40 TABLET ORAL at 21:04

## 2024-06-05 RX ADMIN — Medication 1 APPLICATOR: at 21:08

## 2024-06-05 RX ADMIN — APIXABAN 5 MG: 5 TABLET, FILM COATED ORAL at 18:05

## 2024-06-05 RX ADMIN — Medication 16 G: at 14:48

## 2024-06-05 RX ADMIN — AZITHROMYCIN MONOHYDRATE 500 MG: 500 INJECTION, POWDER, LYOPHILIZED, FOR SOLUTION INTRAVENOUS at 15:13

## 2024-06-05 RX ADMIN — DIPHENHYDRAMINE HYDROCHLORIDE 25 MG: 50 INJECTION INTRAMUSCULAR; INTRAVENOUS at 11:37

## 2024-06-05 RX ADMIN — DORZOLAMIDE HYDROCHLORIDE AND TIMOLOL MALEATE 1 DROP: 20; 5 SOLUTION/ DROPS OPHTHALMIC at 21:06

## 2024-06-05 RX ADMIN — DEXTROSE MONOHYDRATE 25 G: 25 INJECTION, SOLUTION INTRAVENOUS at 08:32

## 2024-06-05 RX ADMIN — Medication 16 G: at 15:09

## 2024-06-05 RX ADMIN — VANCOMYCIN HYDROCHLORIDE 1500 MG: 1.5 INJECTION, POWDER, LYOPHILIZED, FOR SOLUTION INTRAVENOUS at 12:23

## 2024-06-05 RX ADMIN — CEFEPIME 2000 MG: 2 INJECTION, POWDER, FOR SOLUTION INTRAVENOUS at 23:03

## 2024-06-05 RX ADMIN — SODIUM CHLORIDE 500 ML: 9 INJECTION, SOLUTION INTRAVENOUS at 08:36

## 2024-06-05 RX ADMIN — CEFEPIME 2000 MG: 2 INJECTION, POWDER, FOR SOLUTION INTRAVENOUS at 11:39

## 2024-06-05 RX ADMIN — TAMSULOSIN HYDROCHLORIDE 0.4 MG: 0.4 CAPSULE ORAL at 21:04

## 2024-06-05 NOTE — ACP (ADVANCE CARE PLANNING)
patient/agent/surrogate to review completed ACP document and update if needed with changes in condition, patient preferences or care setting    [x] This note routed to one or more involved healthcare providers Linda Waterman, KRISTAL - CNP primary care provider.     Larry Powell indicates that patient has HCPOA paperwork and Susi is the primary. He stated that he is the financial only POA.     Respectfully submitted,    Delmi Ortiz-Praful MONTES DE OCA, JN-S  [facility]   [phone]    Electronically signed by TAVON Patel, ANKITA on 6/5/2024 at 3:59 PM

## 2024-06-05 NOTE — CONSULTS
Clinical Pharmacy Note  Vancomycin Consult    Pharmacy consult received for one-time dose of vancomycin in the Emergency Department per Maggie Call PA-C.    Ht Readings from Last 1 Encounters:   06/05/24 1.803 m (5' 11\")        Wt Readings from Last 1 Encounters:   06/05/24 97 kg (213 lb 13.5 oz)         Assessment/Plan:  Vancomycin 1500 mg x 1 in ED.  If vancomycin is to continue on admission and pharmacy is to manage dosing, please re-consult with admission orders.      Austin Mcbride RPH  6/5/2024 11:13 AM

## 2024-06-05 NOTE — H&P
1. Congestive heart failure.             Consults:    IP CONSULT TO PHARMACY  IP CONSULT TO HOSPITALIST    ASSESSMENT:    Active Hospital Problems    Diagnosis Date Noted    Bacterial pneumonia [J15.9] 06/05/2024     Fall.  -Patient with multiple episodes of fall from bed at his assisted living facility.  CT head negative.    Bacterial pneumonia with gram-negative risk factors.  Patient presented to emergency following a fall however was found to be hypoxic, requiring 2 L of oxygen via nasal cannula, CT chest was subsequently performed showing patchy airspace disease in the left lower lobe concerning for infection with tree-in-bud micronodules.  Blood work significant for leukocytosis of 16.3.  Plan.  -Started on cefepime.  -Received 1 dose of vancomycin in emergency, will send for MRSA swab and hold off on further doses for now.  -Wean down oxygen as tolerated.  -Close clinical observation.  -Send procalcitonin  -Follow blood cultures.     Incidental thyroid nodule  To be followed by PCP    Diabetes mellitus on insulin  Was found to be hypoglycemic on presentation.  Hold glargine and continue on sliding scale for now  Check glucose 4 times daily.     Hyperlipidemia.   Continue home medications.     History of brain tumor s/p resection with residual left-sided weakness.   PT/OT ordered.   Lives in assisted living at baseline    Baseline medications   Continue home Lasix, echo in 2022 with preserved ejection fraction.    Atrial fibrillation.  Continue Eliquis      DVT Prophylaxis: eliquis   Diet: ADULT DIET; Regular  Code Status: Full Code    PT/OT Eval Status: ordered     Dispo - ECF in 2-3 days        Isidoro Mujica MD    Thank you Linda Waterman, KRISTAL - BUTCH for the opportunity to be involved in this patient's care. If you have any questions or concerns please feel free to contact me at [phone].    Comment: Please note this report has been produced using speech recognition software and may

## 2024-06-05 NOTE — ED PROVIDER NOTES
[address] [facility]     EMERGENCY DEPARTMENT ENCOUNTER     Location: [address] [facility]  6/5/2024  Note Started: 8:58 AM EDT 6/5/24      Patient Identification  Fred Dorman is a 78 y.o. male      HPI:Fred Dorman was evaluated in the Emergency Department for falling out of bed.  He states he remembers waking up and trying to get up and ended up falling on the floor.  He states this is the fourth time he is fallen out of his bed in the last few days.  He has a history of chronic A-fib.  He is anticoagulated.  He was noted to be hypoxic and hypotensive on arrival.  Denies head injury or head pain.  No chest pain.  No abdominal pain.  No extremity pain.  He has some left-sided weakness from a brain tumor.  He states he is able to ambulate with a walker. Although initial history and physical exam information was obtained by GIO/NPP/MD/DO (who also dictated a record of this visit), I personally saw the patient and performed and made/approved the management plan and take responsibility for the patient management.      PHYSICAL EXAM:  General: Alert male in no acute distress.  HEENT: No external signs of trauma to the head.  Pupils equal round reactive.  Oropharynx is negative.  Neck: Supple, nontender.  Heart: Irregular regular.  No murmurs gallops noted.  Lungs: Breath sounds equal bilaterally with a few basilar rales.  No wheezes.  Abdomen: Obese, soft, nontender.  Musculoskeletal: 2+ edema bilateral lower extremities.  No bony tenderness in the upper or lower extremities.  No obvious deformity.  Intact range of motion without pain.  Neuro: Awake, alert, oriented.  His speech is somewhat slurred.  He is diffusely weak but is able to move his upper and lower extremities symmetrically in bed.    EKG Interpretation  Atrial fibrillation, RVR, ventricular rate of 101, poor baseline, nonspecific ST-T wave changes.  Rhythm strip shows atrial fibrillation with RVR with ventricular rate of 101, QRS 66 ms with no other 
137/69 79 25 95 %   06/05/24 1130 135/75 80 21 98 %   06/05/24 1200 130/62 76 22 98 %   06/05/24 1400 (!) 142/72 77 -- 99 %      Recent Labs     06/05/24  0838   WBC 16.3*   LACTA 3.2*   CREATININE 1.2   BILITOT 0.8            Time Severe Sepsis Identified: 1007    Fluid Resuscitation Rational: less than 30mL/kg because of a history of CHF NYHA III or IV with symptoms with minimal exertion/at rest.  Instead, 500mL was ordered.  More fluid initially would be potentially detrimental to the patient      Repeat lactate level: ordered and pending at this time    Reassessment Exam:   Not applicable. Patient does not have septic shock.        Chronic Conditions affecting care:    has a past medical history of Arthritis, Atrial fibrillation (HCC), Brain bleed (HCC), Brain tumor (HCC) (1983, 1995), Cancer (HCC), Cerebral artery occlusion with cerebral infarction (HCC), CHF (congestive heart failure) (HCC), Chronic renal disease, Deaf, Diabetes (HCC), Diabetes mellitus (HCC), Endocarditis (late 1990's), High blood pressure, History of blood transfusion, Hyperlipidemia, Left-sided weakness, Peptic ulcer (2013), Sick sinus syndrome (HCC), and Slurred speech.    CONSULTS: (Who and What was discussed)  IP CONSULT TO PHARMACY  IP CONSULT TO HOSPITALIST      Records Reviewed (External and Source)     CC/HPI Summary, DDx, ED Course, and Reassessment:   Patient is a 78-year-old male with a history of atrial fibrillation on Eliquis presenting to the ED with a concern of a fall that happened this morning after he rolled over his bed and fell.  He denies chest pain, shortness of breath, dizziness, blurry vision, headaches.    Ddx: Pneumonia, pneumothorax, COVID, influenza, ACS, CHF, COPD, asthma, malignancy, others    On arrival to the ED the patient had a blood pressure of 80/55.  Patient does not require oxygen at home, he became hypoxic of 88%, currently on 2 L at 93%  Physical examination show an alert and oriented male,

## 2024-06-06 LAB
ANION GAP SERPL CALCULATED.3IONS-SCNC: 7 MMOL/L (ref 3–16)
BASOPHILS # BLD: 0.1 K/UL (ref 0–0.2)
BASOPHILS NFR BLD: 0.8 %
BUN SERPL-MCNC: 24 MG/DL (ref 7–20)
CALCIUM SERPL-MCNC: 8.5 MG/DL (ref 8.3–10.6)
CHLORIDE SERPL-SCNC: 109 MMOL/L (ref 99–110)
CO2 SERPL-SCNC: 29 MMOL/L (ref 21–32)
CREAT SERPL-MCNC: 1.3 MG/DL (ref 0.8–1.3)
DEPRECATED RDW RBC AUTO: 14 % (ref 12.4–15.4)
EKG DIAGNOSIS: NORMAL
EKG Q-T INTERVAL: 336 MS
EKG QRS DURATION: 66 MS
EKG QTC CALCULATION (BAZETT): 435 MS
EKG R AXIS: 7 DEGREES
EKG T AXIS: 88 DEGREES
EKG VENTRICULAR RATE: 101 BPM
EOSINOPHIL # BLD: 0.3 K/UL (ref 0–0.6)
EOSINOPHIL NFR BLD: 3.1 %
GFR SERPLBLD CREATININE-BSD FMLA CKD-EPI: 56 ML/MIN/{1.73_M2}
GLUCOSE BLD-MCNC: 204 MG/DL (ref 70–99)
GLUCOSE BLD-MCNC: 240 MG/DL (ref 70–99)
GLUCOSE BLD-MCNC: 252 MG/DL (ref 70–99)
GLUCOSE BLD-MCNC: 63 MG/DL (ref 70–99)
GLUCOSE BLD-MCNC: 78 MG/DL (ref 70–99)
GLUCOSE SERPL-MCNC: 79 MG/DL (ref 70–99)
HCT VFR BLD AUTO: 35.4 % (ref 40.5–52.5)
HGB BLD-MCNC: 11.9 G/DL (ref 13.5–17.5)
LYMPHOCYTES # BLD: 1.5 K/UL (ref 1–5.1)
LYMPHOCYTES NFR BLD: 16.3 %
MCH RBC QN AUTO: 27.7 PG (ref 26–34)
MCHC RBC AUTO-ENTMCNC: 33.6 G/DL (ref 31–36)
MCV RBC AUTO: 82.4 FL (ref 80–100)
MONOCYTES # BLD: 0.8 K/UL (ref 0–1.3)
MONOCYTES NFR BLD: 8.2 %
NEUTROPHILS # BLD: 6.7 K/UL (ref 1.7–7.7)
NEUTROPHILS NFR BLD: 71.6 %
PERFORMED ON: ABNORMAL
PERFORMED ON: NORMAL
PLATELET # BLD AUTO: 134 K/UL (ref 135–450)
PMV BLD AUTO: 8.6 FL (ref 5–10.5)
POTASSIUM SERPL-SCNC: 3.6 MMOL/L (ref 3.5–5.1)
RBC # BLD AUTO: 4.3 M/UL (ref 4.2–5.9)
REPORT: NORMAL
SODIUM SERPL-SCNC: 145 MMOL/L (ref 136–145)
WBC # BLD AUTO: 9.4 K/UL (ref 4–11)

## 2024-06-06 PROCEDURE — 87641 MR-STAPH DNA AMP PROBE: CPT

## 2024-06-06 PROCEDURE — 2580000003 HC RX 258: Performed by: STUDENT IN AN ORGANIZED HEALTH CARE EDUCATION/TRAINING PROGRAM

## 2024-06-06 PROCEDURE — 85025 COMPLETE CBC W/AUTO DIFF WBC: CPT

## 2024-06-06 PROCEDURE — 6370000000 HC RX 637 (ALT 250 FOR IP): Performed by: STUDENT IN AN ORGANIZED HEALTH CARE EDUCATION/TRAINING PROGRAM

## 2024-06-06 PROCEDURE — 87040 BLOOD CULTURE FOR BACTERIA: CPT

## 2024-06-06 PROCEDURE — 80048 BASIC METABOLIC PNL TOTAL CA: CPT

## 2024-06-06 PROCEDURE — 36415 COLL VENOUS BLD VENIPUNCTURE: CPT

## 2024-06-06 PROCEDURE — 93010 ELECTROCARDIOGRAM REPORT: CPT | Performed by: INTERNAL MEDICINE

## 2024-06-06 PROCEDURE — 1200000000 HC SEMI PRIVATE

## 2024-06-06 PROCEDURE — 6360000002 HC RX W HCPCS: Performed by: STUDENT IN AN ORGANIZED HEALTH CARE EDUCATION/TRAINING PROGRAM

## 2024-06-06 PROCEDURE — 94760 N-INVAS EAR/PLS OXIMETRY 1: CPT

## 2024-06-06 RX ADMIN — INSULIN LISPRO 2 UNITS: 100 INJECTION, SOLUTION INTRAVENOUS; SUBCUTANEOUS at 17:46

## 2024-06-06 RX ADMIN — APIXABAN 5 MG: 5 TABLET, FILM COATED ORAL at 21:48

## 2024-06-06 RX ADMIN — PRAVASTATIN SODIUM 40 MG: 40 TABLET ORAL at 21:48

## 2024-06-06 RX ADMIN — PANTOPRAZOLE SODIUM 40 MG: 40 TABLET, DELAYED RELEASE ORAL at 16:35

## 2024-06-06 RX ADMIN — SODIUM CHLORIDE, PRESERVATIVE FREE 10 ML: 5 INJECTION INTRAVENOUS at 10:28

## 2024-06-06 RX ADMIN — Medication 1 APPLICATOR: at 10:27

## 2024-06-06 RX ADMIN — INSULIN LISPRO 1 UNITS: 100 INJECTION, SOLUTION INTRAVENOUS; SUBCUTANEOUS at 12:52

## 2024-06-06 RX ADMIN — Medication 16 G: at 07:28

## 2024-06-06 RX ADMIN — SODIUM CHLORIDE, PRESERVATIVE FREE 10 ML: 5 INJECTION INTRAVENOUS at 21:48

## 2024-06-06 RX ADMIN — Medication 1 APPLICATOR: at 21:47

## 2024-06-06 RX ADMIN — DORZOLAMIDE HYDROCHLORIDE AND TIMOLOL MALEATE 1 DROP: 20; 5 SOLUTION/ DROPS OPHTHALMIC at 21:00

## 2024-06-06 RX ADMIN — APIXABAN 5 MG: 5 TABLET, FILM COATED ORAL at 10:27

## 2024-06-06 RX ADMIN — FUROSEMIDE 20 MG: 20 TABLET ORAL at 10:27

## 2024-06-06 RX ADMIN — CEFEPIME 2000 MG: 2 INJECTION, POWDER, FOR SOLUTION INTRAVENOUS at 06:36

## 2024-06-06 RX ADMIN — CEFEPIME 2000 MG: 2 INJECTION, POWDER, FOR SOLUTION INTRAVENOUS at 17:52

## 2024-06-06 RX ADMIN — TAMSULOSIN HYDROCHLORIDE 0.4 MG: 0.4 CAPSULE ORAL at 21:48

## 2024-06-07 LAB
ANION GAP SERPL CALCULATED.3IONS-SCNC: 5 MMOL/L (ref 3–16)
BASOPHILS # BLD: 0 K/UL (ref 0–0.2)
BASOPHILS NFR BLD: 0.5 %
BUN SERPL-MCNC: 24 MG/DL (ref 7–20)
CALCIUM SERPL-MCNC: 8.6 MG/DL (ref 8.3–10.6)
CHLORIDE SERPL-SCNC: 108 MMOL/L (ref 99–110)
CO2 SERPL-SCNC: 29 MMOL/L (ref 21–32)
CREAT SERPL-MCNC: 1.3 MG/DL (ref 0.8–1.3)
DEPRECATED RDW RBC AUTO: 13.9 % (ref 12.4–15.4)
EOSINOPHIL # BLD: 0.4 K/UL (ref 0–0.6)
EOSINOPHIL NFR BLD: 4.9 %
GFR SERPLBLD CREATININE-BSD FMLA CKD-EPI: 56 ML/MIN/{1.73_M2}
GLUCOSE BLD-MCNC: 176 MG/DL (ref 70–99)
GLUCOSE BLD-MCNC: 202 MG/DL (ref 70–99)
GLUCOSE BLD-MCNC: 212 MG/DL (ref 70–99)
GLUCOSE BLD-MCNC: 221 MG/DL (ref 70–99)
GLUCOSE SERPL-MCNC: 159 MG/DL (ref 70–99)
HCT VFR BLD AUTO: 36.4 % (ref 40.5–52.5)
HGB BLD-MCNC: 11.9 G/DL (ref 13.5–17.5)
LYMPHOCYTES # BLD: 1.2 K/UL (ref 1–5.1)
LYMPHOCYTES NFR BLD: 15.2 %
MCH RBC QN AUTO: 27.5 PG (ref 26–34)
MCHC RBC AUTO-ENTMCNC: 32.8 G/DL (ref 31–36)
MCV RBC AUTO: 83.9 FL (ref 80–100)
MONOCYTES # BLD: 0.7 K/UL (ref 0–1.3)
MONOCYTES NFR BLD: 9.3 %
MRSA DNA SPEC QL NAA+PROBE: NORMAL
NEUTROPHILS # BLD: 5.5 K/UL (ref 1.7–7.7)
NEUTROPHILS NFR BLD: 70.1 %
PERFORMED ON: ABNORMAL
PLATELET # BLD AUTO: 129 K/UL (ref 135–450)
PMV BLD AUTO: 8.7 FL (ref 5–10.5)
POTASSIUM SERPL-SCNC: 3.8 MMOL/L (ref 3.5–5.1)
PROCALCITONIN SERPL IA-MCNC: 0.95 NG/ML (ref 0–0.15)
PROCALCITONIN SERPL IA-MCNC: 1.3 NG/ML (ref 0–0.15)
RBC # BLD AUTO: 4.33 M/UL (ref 4.2–5.9)
SODIUM SERPL-SCNC: 142 MMOL/L (ref 136–145)
WBC # BLD AUTO: 7.8 K/UL (ref 4–11)

## 2024-06-07 PROCEDURE — 1200000000 HC SEMI PRIVATE

## 2024-06-07 PROCEDURE — 97116 GAIT TRAINING THERAPY: CPT

## 2024-06-07 PROCEDURE — 97530 THERAPEUTIC ACTIVITIES: CPT

## 2024-06-07 PROCEDURE — 6370000000 HC RX 637 (ALT 250 FOR IP): Performed by: STUDENT IN AN ORGANIZED HEALTH CARE EDUCATION/TRAINING PROGRAM

## 2024-06-07 PROCEDURE — 94760 N-INVAS EAR/PLS OXIMETRY 1: CPT

## 2024-06-07 PROCEDURE — 97166 OT EVAL MOD COMPLEX 45 MIN: CPT

## 2024-06-07 PROCEDURE — 85025 COMPLETE CBC W/AUTO DIFF WBC: CPT

## 2024-06-07 PROCEDURE — 6360000002 HC RX W HCPCS: Performed by: STUDENT IN AN ORGANIZED HEALTH CARE EDUCATION/TRAINING PROGRAM

## 2024-06-07 PROCEDURE — 36415 COLL VENOUS BLD VENIPUNCTURE: CPT

## 2024-06-07 PROCEDURE — 80048 BASIC METABOLIC PNL TOTAL CA: CPT

## 2024-06-07 PROCEDURE — 97162 PT EVAL MOD COMPLEX 30 MIN: CPT

## 2024-06-07 PROCEDURE — 2580000003 HC RX 258: Performed by: STUDENT IN AN ORGANIZED HEALTH CARE EDUCATION/TRAINING PROGRAM

## 2024-06-07 PROCEDURE — 84145 PROCALCITONIN (PCT): CPT

## 2024-06-07 RX ADMIN — Medication 1 APPLICATOR: at 21:06

## 2024-06-07 RX ADMIN — DORZOLAMIDE HYDROCHLORIDE AND TIMOLOL MALEATE 1 DROP: 20; 5 SOLUTION/ DROPS OPHTHALMIC at 21:05

## 2024-06-07 RX ADMIN — FUROSEMIDE 20 MG: 20 TABLET ORAL at 09:41

## 2024-06-07 RX ADMIN — PANTOPRAZOLE SODIUM 40 MG: 40 TABLET, DELAYED RELEASE ORAL at 06:16

## 2024-06-07 RX ADMIN — PRAVASTATIN SODIUM 40 MG: 40 TABLET ORAL at 21:05

## 2024-06-07 RX ADMIN — CEFEPIME 2000 MG: 2 INJECTION, POWDER, FOR SOLUTION INTRAVENOUS at 06:16

## 2024-06-07 RX ADMIN — INSULIN LISPRO 1 UNITS: 100 INJECTION, SOLUTION INTRAVENOUS; SUBCUTANEOUS at 18:06

## 2024-06-07 RX ADMIN — TAMSULOSIN HYDROCHLORIDE 0.4 MG: 0.4 CAPSULE ORAL at 21:05

## 2024-06-07 RX ADMIN — CEFEPIME 2000 MG: 2 INJECTION, POWDER, FOR SOLUTION INTRAVENOUS at 18:11

## 2024-06-07 RX ADMIN — Medication 1 APPLICATOR: at 12:29

## 2024-06-07 RX ADMIN — DORZOLAMIDE HYDROCHLORIDE AND TIMOLOL MALEATE 1 DROP: 20; 5 SOLUTION/ DROPS OPHTHALMIC at 12:29

## 2024-06-07 RX ADMIN — INSULIN LISPRO 1 UNITS: 100 INJECTION, SOLUTION INTRAVENOUS; SUBCUTANEOUS at 09:40

## 2024-06-07 RX ADMIN — APIXABAN 5 MG: 5 TABLET, FILM COATED ORAL at 09:42

## 2024-06-07 RX ADMIN — APIXABAN 5 MG: 5 TABLET, FILM COATED ORAL at 21:05

## 2024-06-08 LAB
ANION GAP SERPL CALCULATED.3IONS-SCNC: 8 MMOL/L (ref 3–16)
BACTERIA BLD CULT: ABNORMAL
BACTERIA BLD CULT: ABNORMAL
BASOPHILS # BLD: 0 K/UL (ref 0–0.2)
BASOPHILS NFR BLD: 0.3 %
BUN SERPL-MCNC: 24 MG/DL (ref 7–20)
CALCIUM SERPL-MCNC: 8.3 MG/DL (ref 8.3–10.6)
CHLORIDE SERPL-SCNC: 105 MMOL/L (ref 99–110)
CO2 SERPL-SCNC: 27 MMOL/L (ref 21–32)
CREAT SERPL-MCNC: 1.1 MG/DL (ref 0.8–1.3)
DEPRECATED RDW RBC AUTO: 13.6 % (ref 12.4–15.4)
EOSINOPHIL # BLD: 0.4 K/UL (ref 0–0.6)
EOSINOPHIL NFR BLD: 6.1 %
GFR SERPLBLD CREATININE-BSD FMLA CKD-EPI: 69 ML/MIN/{1.73_M2}
GLUCOSE BLD-MCNC: 168 MG/DL (ref 70–99)
GLUCOSE BLD-MCNC: 227 MG/DL (ref 70–99)
GLUCOSE BLD-MCNC: 264 MG/DL (ref 70–99)
GLUCOSE BLD-MCNC: 286 MG/DL (ref 70–99)
GLUCOSE SERPL-MCNC: 198 MG/DL (ref 70–99)
HCT VFR BLD AUTO: 36.9 % (ref 40.5–52.5)
HGB BLD-MCNC: 12.3 G/DL (ref 13.5–17.5)
LYMPHOCYTES # BLD: 1 K/UL (ref 1–5.1)
LYMPHOCYTES NFR BLD: 13.9 %
MCH RBC QN AUTO: 27.9 PG (ref 26–34)
MCHC RBC AUTO-ENTMCNC: 33.3 G/DL (ref 31–36)
MCV RBC AUTO: 83.8 FL (ref 80–100)
MONOCYTES # BLD: 0.5 K/UL (ref 0–1.3)
MONOCYTES NFR BLD: 7.6 %
NEUTROPHILS # BLD: 5.1 K/UL (ref 1.7–7.7)
NEUTROPHILS NFR BLD: 72.1 %
ORGANISM: ABNORMAL
ORGANISM: ABNORMAL
PERFORMED ON: ABNORMAL
PLATELET # BLD AUTO: 125 K/UL (ref 135–450)
PMV BLD AUTO: 9.1 FL (ref 5–10.5)
POTASSIUM SERPL-SCNC: 3.7 MMOL/L (ref 3.5–5.1)
RBC # BLD AUTO: 4.41 M/UL (ref 4.2–5.9)
SODIUM SERPL-SCNC: 140 MMOL/L (ref 136–145)
WBC # BLD AUTO: 7 K/UL (ref 4–11)

## 2024-06-08 PROCEDURE — 2580000003 HC RX 258: Performed by: STUDENT IN AN ORGANIZED HEALTH CARE EDUCATION/TRAINING PROGRAM

## 2024-06-08 PROCEDURE — 1200000000 HC SEMI PRIVATE

## 2024-06-08 PROCEDURE — 6370000000 HC RX 637 (ALT 250 FOR IP): Performed by: STUDENT IN AN ORGANIZED HEALTH CARE EDUCATION/TRAINING PROGRAM

## 2024-06-08 PROCEDURE — 80048 BASIC METABOLIC PNL TOTAL CA: CPT

## 2024-06-08 PROCEDURE — 6360000002 HC RX W HCPCS: Performed by: STUDENT IN AN ORGANIZED HEALTH CARE EDUCATION/TRAINING PROGRAM

## 2024-06-08 PROCEDURE — 85025 COMPLETE CBC W/AUTO DIFF WBC: CPT

## 2024-06-08 PROCEDURE — 94760 N-INVAS EAR/PLS OXIMETRY 1: CPT

## 2024-06-08 PROCEDURE — 36415 COLL VENOUS BLD VENIPUNCTURE: CPT

## 2024-06-08 RX ORDER — LEVOFLOXACIN 750 MG/1
750 TABLET, FILM COATED ORAL DAILY
Qty: 3 TABLET | Refills: 0 | Status: SHIPPED | OUTPATIENT
Start: 2024-06-08 | End: 2024-06-11

## 2024-06-08 RX ORDER — CALCIUM CARB/VITAMIN D3/VIT K1 500-100-40
TABLET,CHEWABLE ORAL
Qty: 113 G | Refills: 1 | Status: SHIPPED | OUTPATIENT
Start: 2024-06-08

## 2024-06-08 RX ADMIN — INSULIN LISPRO 1 UNITS: 100 INJECTION, SOLUTION INTRAVENOUS; SUBCUTANEOUS at 12:13

## 2024-06-08 RX ADMIN — DORZOLAMIDE HYDROCHLORIDE AND TIMOLOL MALEATE 1 DROP: 20; 5 SOLUTION/ DROPS OPHTHALMIC at 21:45

## 2024-06-08 RX ADMIN — PRAVASTATIN SODIUM 40 MG: 40 TABLET ORAL at 21:44

## 2024-06-08 RX ADMIN — TAMSULOSIN HYDROCHLORIDE 0.4 MG: 0.4 CAPSULE ORAL at 21:44

## 2024-06-08 RX ADMIN — FUROSEMIDE 20 MG: 20 TABLET ORAL at 08:58

## 2024-06-08 RX ADMIN — CEFEPIME 2000 MG: 2 INJECTION, POWDER, FOR SOLUTION INTRAVENOUS at 05:30

## 2024-06-08 RX ADMIN — Medication 1 APPLICATOR: at 08:57

## 2024-06-08 RX ADMIN — CEFEPIME 2000 MG: 2 INJECTION, POWDER, FOR SOLUTION INTRAVENOUS at 17:37

## 2024-06-08 RX ADMIN — Medication 1 APPLICATOR: at 21:45

## 2024-06-08 RX ADMIN — INSULIN LISPRO 2 UNITS: 100 INJECTION, SOLUTION INTRAVENOUS; SUBCUTANEOUS at 17:35

## 2024-06-08 RX ADMIN — APIXABAN 5 MG: 5 TABLET, FILM COATED ORAL at 21:44

## 2024-06-08 RX ADMIN — SODIUM CHLORIDE, PRESERVATIVE FREE 10 ML: 5 INJECTION INTRAVENOUS at 09:00

## 2024-06-08 RX ADMIN — APIXABAN 5 MG: 5 TABLET, FILM COATED ORAL at 08:58

## 2024-06-08 RX ADMIN — DORZOLAMIDE HYDROCHLORIDE AND TIMOLOL MALEATE 1 DROP: 20; 5 SOLUTION/ DROPS OPHTHALMIC at 08:58

## 2024-06-08 ASSESSMENT — PAIN SCALES - GENERAL: PAINLEVEL_OUTOF10: 0

## 2024-06-08 NOTE — DISCHARGE INSTR - COC
Continuity of Care Form    Patient Name: Fred Dorman   :  1945  MRN:  [id number]    Admit date:  2024  Discharge date:  06/10/2024    Code Status Order: Full Code   Advance Directives:     Admitting Physician:  Isidoro Mujica MD  PCP: Linda Waterman, APRN - CNP    Discharging Nurse: madan jefferson  Discharging Hospital Unit/Room#: D5K-8418/3121-01  Discharging Unit Phone Number: [phone]    Emergency Contact:   Extended Emergency Contact Information  Primary Emergency Contact: Mariela Quiroz  Home Phone: [phone]  Mobile Phone: [phone]  Relation: Step Child   needed? No  Secondary Emergency Contact: Lawrence Dsouza  Home Phone: [phone]  Relation: Step Child    Past Surgical History:  Past Surgical History:   Procedure Laterality Date    APPENDECTOMY  age 5    BROW LIFT      browplasty    CARDIAC SURGERY      repair heart valve surgery at [facility] Mitral valve    CATARACT REMOVAL WITH IMPLANT Bilateral     CRANIOTOMY Right 2021    TREPHINE CRANIOTOMY FOR SUBDURAL EVACUATION performed by Mitul Allen MD at [facility] OR    CRANIOTOMY Right 2021    EXPOSE AND REPLACE PROGRAMMABLE SHUNT VALVE performed by Mitul Allen MD at [facility] OR    EYE SURGERY Left 2022    LEFT LOWER LID ECTROPION REPAIR WITH CANTHOPLASTY - LEFT EYE performed by Radha Harris MD at [facility]    EYE SURGERY Left 2022    TARSORRHAPHY - LEFT EYE performed by Radha Harris MD at [facility]    TONSILLECTOMY  age 8    TUMOR EXCISION      Brain    UPPER GASTROINTESTINAL ENDOSCOPY         Immunization History:   Immunization History   Administered Date(s) Administered    COVID-19, PFIZER PURPLE top, DILUTE for use, (age 12 y+), 30mcg/0.3mL 2021, 2021, 2021    Influenza Virus Vaccine 10/01/2003, 2010, 2011, 10/24/2012, 10/06/2014, 10/01/2016, 10/01/2017, 10/01/2018    Influenza Whole 10/06/2014    Influenza, FLUAD,

## 2024-06-08 NOTE — DISCHARGE SUMMARY
Hospital Medicine Discharge Summary    Patient ID: Fred Dorman      Patient's PCP: Linda Waterman, APRN - CNP    Admit Date: 6/5/2024     Discharge Date:   06/08/24      Admitting Provider: Isidoro Mujica MD     Discharge Provider: Isidoro Mujica MD     Discharge Diagnoses:       Active Hospital Problems    Diagnosis     Bacterial pneumonia [J15.9]        The patient was seen and examined on day of discharge and this discharge summary is in conjunction with any daily progress note from day of discharge.    Hospital Course:   78 y.o. 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      Patient resides at assisted [facility] at baseline, Patient reported that he had fallen 3 times in the last few days, he reported that he had fell off the bed to the floor, patient had reported feeling weak over the last few days but denied any chest pain, shortness of breath, fever.   He did report a chronic cough.         Patient resides at [facility]  , since his wife passed last November.       Fall.  -Patient with multiple episodes of fall from bed at his assisted living facility.  CT head negative.     Bacterial pneumonia with gram-negative risk factors.  Patient presented to emergency following a fall however was found to be hypoxic, requiring 2 L of oxygen via nasal cannula, CT chest was subsequently performed showing patchy airspace disease in the left lower lobe concerning for infection with tree-in-bud micronodules.  Blood work significant for leukocytosis of 16.3.     WBC improving 9.4--> 7.   Procal 0.27.   Weaned off oxygen.   MRSA swab negative   Plan.  -continue  on cefepime. ( Will DC on Levaquin )   -Close clinical observation.  -Follow blood cultures. ( NTD , staph likely contaminant , repeat is negative )   - will trend procal

## 2024-06-09 LAB
ANION GAP SERPL CALCULATED.3IONS-SCNC: 5 MMOL/L (ref 3–16)
BACTERIA BLD CULT ORG #2: NORMAL
BASOPHILS # BLD: 0 K/UL (ref 0–0.2)
BASOPHILS NFR BLD: 0.3 %
BUN SERPL-MCNC: 30 MG/DL (ref 7–20)
CALCIUM SERPL-MCNC: 8.7 MG/DL (ref 8.3–10.6)
CHLORIDE SERPL-SCNC: 104 MMOL/L (ref 99–110)
CO2 SERPL-SCNC: 30 MMOL/L (ref 21–32)
CREAT SERPL-MCNC: 1.3 MG/DL (ref 0.8–1.3)
DEPRECATED RDW RBC AUTO: 14 % (ref 12.4–15.4)
EOSINOPHIL # BLD: 0.5 K/UL (ref 0–0.6)
EOSINOPHIL NFR BLD: 7.1 %
GFR SERPLBLD CREATININE-BSD FMLA CKD-EPI: 56 ML/MIN/{1.73_M2}
GLUCOSE BLD-MCNC: 111 MG/DL (ref 70–99)
GLUCOSE BLD-MCNC: 205 MG/DL (ref 70–99)
GLUCOSE BLD-MCNC: 217 MG/DL (ref 70–99)
GLUCOSE BLD-MCNC: 311 MG/DL (ref 70–99)
GLUCOSE SERPL-MCNC: 208 MG/DL (ref 70–99)
HCT VFR BLD AUTO: 37.2 % (ref 40.5–52.5)
HGB BLD-MCNC: 12.4 G/DL (ref 13.5–17.5)
LYMPHOCYTES # BLD: 1.3 K/UL (ref 1–5.1)
LYMPHOCYTES NFR BLD: 16.9 %
MCH RBC QN AUTO: 27.6 PG (ref 26–34)
MCHC RBC AUTO-ENTMCNC: 33.2 G/DL (ref 31–36)
MCV RBC AUTO: 83.3 FL (ref 80–100)
MONOCYTES # BLD: 0.6 K/UL (ref 0–1.3)
MONOCYTES NFR BLD: 7.9 %
NEUTROPHILS # BLD: 5.2 K/UL (ref 1.7–7.7)
NEUTROPHILS NFR BLD: 67.8 %
PERFORMED ON: ABNORMAL
PLATELET # BLD AUTO: 130 K/UL (ref 135–450)
PMV BLD AUTO: 8.9 FL (ref 5–10.5)
POTASSIUM SERPL-SCNC: 3.8 MMOL/L (ref 3.5–5.1)
RBC # BLD AUTO: 4.47 M/UL (ref 4.2–5.9)
SODIUM SERPL-SCNC: 139 MMOL/L (ref 136–145)
WBC # BLD AUTO: 7.6 K/UL (ref 4–11)

## 2024-06-09 PROCEDURE — 94760 N-INVAS EAR/PLS OXIMETRY 1: CPT

## 2024-06-09 PROCEDURE — 6370000000 HC RX 637 (ALT 250 FOR IP): Performed by: STUDENT IN AN ORGANIZED HEALTH CARE EDUCATION/TRAINING PROGRAM

## 2024-06-09 PROCEDURE — 2580000003 HC RX 258: Performed by: STUDENT IN AN ORGANIZED HEALTH CARE EDUCATION/TRAINING PROGRAM

## 2024-06-09 PROCEDURE — 80048 BASIC METABOLIC PNL TOTAL CA: CPT

## 2024-06-09 PROCEDURE — 1200000000 HC SEMI PRIVATE

## 2024-06-09 PROCEDURE — 85025 COMPLETE CBC W/AUTO DIFF WBC: CPT

## 2024-06-09 PROCEDURE — 36415 COLL VENOUS BLD VENIPUNCTURE: CPT

## 2024-06-09 PROCEDURE — 6360000002 HC RX W HCPCS: Performed by: STUDENT IN AN ORGANIZED HEALTH CARE EDUCATION/TRAINING PROGRAM

## 2024-06-09 RX ADMIN — APIXABAN 5 MG: 5 TABLET, FILM COATED ORAL at 20:22

## 2024-06-09 RX ADMIN — PRAVASTATIN SODIUM 40 MG: 40 TABLET ORAL at 20:22

## 2024-06-09 RX ADMIN — ACETAMINOPHEN 325MG 650 MG: 325 TABLET ORAL at 22:20

## 2024-06-09 RX ADMIN — DORZOLAMIDE HYDROCHLORIDE AND TIMOLOL MALEATE 1 DROP: 20; 5 SOLUTION/ DROPS OPHTHALMIC at 20:22

## 2024-06-09 RX ADMIN — PANTOPRAZOLE SODIUM 40 MG: 40 TABLET, DELAYED RELEASE ORAL at 06:15

## 2024-06-09 RX ADMIN — Medication 1 APPLICATOR: at 08:50

## 2024-06-09 RX ADMIN — TAMSULOSIN HYDROCHLORIDE 0.4 MG: 0.4 CAPSULE ORAL at 20:22

## 2024-06-09 RX ADMIN — INSULIN LISPRO 1 UNITS: 100 INJECTION, SOLUTION INTRAVENOUS; SUBCUTANEOUS at 08:49

## 2024-06-09 RX ADMIN — DORZOLAMIDE HYDROCHLORIDE AND TIMOLOL MALEATE 1 DROP: 20; 5 SOLUTION/ DROPS OPHTHALMIC at 08:51

## 2024-06-09 RX ADMIN — APIXABAN 5 MG: 5 TABLET, FILM COATED ORAL at 08:49

## 2024-06-09 RX ADMIN — CEFEPIME 2000 MG: 2 INJECTION, POWDER, FOR SOLUTION INTRAVENOUS at 17:45

## 2024-06-09 RX ADMIN — INSULIN LISPRO 3 UNITS: 100 INJECTION, SOLUTION INTRAVENOUS; SUBCUTANEOUS at 11:47

## 2024-06-09 RX ADMIN — FUROSEMIDE 20 MG: 20 TABLET ORAL at 08:49

## 2024-06-09 RX ADMIN — Medication 1 APPLICATOR: at 20:22

## 2024-06-09 RX ADMIN — CEFEPIME 2000 MG: 2 INJECTION, POWDER, FOR SOLUTION INTRAVENOUS at 06:18

## 2024-06-09 ASSESSMENT — PAIN DESCRIPTION - DESCRIPTORS: DESCRIPTORS: ACHING

## 2024-06-09 ASSESSMENT — PAIN DESCRIPTION - PAIN TYPE: TYPE: ACUTE PAIN

## 2024-06-09 ASSESSMENT — PAIN SCALES - GENERAL
PAINLEVEL_OUTOF10: 0
PAINLEVEL_OUTOF10: 0
PAINLEVEL_OUTOF10: 3
PAINLEVEL_OUTOF10: 0

## 2024-06-09 ASSESSMENT — PAIN DESCRIPTION - LOCATION: LOCATION: HEAD

## 2024-06-09 ASSESSMENT — PAIN DESCRIPTION - FREQUENCY: FREQUENCY: INTERMITTENT

## 2024-06-09 ASSESSMENT — PAIN - FUNCTIONAL ASSESSMENT: PAIN_FUNCTIONAL_ASSESSMENT: ACTIVITIES ARE NOT PREVENTED

## 2024-06-09 ASSESSMENT — PAIN DESCRIPTION - ONSET: ONSET: GRADUAL

## 2024-06-10 VITALS
HEART RATE: 69 BPM | HEIGHT: 71 IN | DIASTOLIC BLOOD PRESSURE: 66 MMHG | SYSTOLIC BLOOD PRESSURE: 105 MMHG | WEIGHT: 199.52 LBS | RESPIRATION RATE: 16 BRPM | BODY MASS INDEX: 27.93 KG/M2 | TEMPERATURE: 97.9 F | OXYGEN SATURATION: 94 %

## 2024-06-10 LAB
ANION GAP SERPL CALCULATED.3IONS-SCNC: 6 MMOL/L (ref 3–16)
BACTERIA BLD CULT ORG #2: NORMAL
BACTERIA BLD CULT: NORMAL
BASOPHILS # BLD: 0 K/UL (ref 0–0.2)
BASOPHILS NFR BLD: 0.2 %
BUN SERPL-MCNC: 26 MG/DL (ref 7–20)
CALCIUM SERPL-MCNC: 8.6 MG/DL (ref 8.3–10.6)
CHLORIDE SERPL-SCNC: 104 MMOL/L (ref 99–110)
CO2 SERPL-SCNC: 29 MMOL/L (ref 21–32)
CREAT SERPL-MCNC: 1.2 MG/DL (ref 0.8–1.3)
DEPRECATED RDW RBC AUTO: 13.9 % (ref 12.4–15.4)
EOSINOPHIL # BLD: 0.6 K/UL (ref 0–0.6)
EOSINOPHIL NFR BLD: 7.9 %
GFR SERPLBLD CREATININE-BSD FMLA CKD-EPI: 62 ML/MIN/{1.73_M2}
GLUCOSE BLD-MCNC: 192 MG/DL (ref 70–99)
GLUCOSE BLD-MCNC: 253 MG/DL (ref 70–99)
GLUCOSE SERPL-MCNC: 201 MG/DL (ref 70–99)
HCT VFR BLD AUTO: 35.3 % (ref 40.5–52.5)
HGB BLD-MCNC: 12 G/DL (ref 13.5–17.5)
LYMPHOCYTES # BLD: 1.1 K/UL (ref 1–5.1)
LYMPHOCYTES NFR BLD: 15.4 %
MCH RBC QN AUTO: 28 PG (ref 26–34)
MCHC RBC AUTO-ENTMCNC: 34 G/DL (ref 31–36)
MCV RBC AUTO: 82.2 FL (ref 80–100)
MONOCYTES # BLD: 0.6 K/UL (ref 0–1.3)
MONOCYTES NFR BLD: 7.6 %
NEUTROPHILS # BLD: 5 K/UL (ref 1.7–7.7)
NEUTROPHILS NFR BLD: 68.9 %
PERFORMED ON: ABNORMAL
PERFORMED ON: ABNORMAL
PLATELET # BLD AUTO: 115 K/UL (ref 135–450)
PMV BLD AUTO: 8.8 FL (ref 5–10.5)
POTASSIUM SERPL-SCNC: 3.6 MMOL/L (ref 3.5–5.1)
PROCALCITONIN SERPL IA-MCNC: 0.25 NG/ML (ref 0–0.15)
RBC # BLD AUTO: 4.3 M/UL (ref 4.2–5.9)
SODIUM SERPL-SCNC: 139 MMOL/L (ref 136–145)
WBC # BLD AUTO: 7.3 K/UL (ref 4–11)

## 2024-06-10 PROCEDURE — 94760 N-INVAS EAR/PLS OXIMETRY 1: CPT

## 2024-06-10 PROCEDURE — 84145 PROCALCITONIN (PCT): CPT

## 2024-06-10 PROCEDURE — 6370000000 HC RX 637 (ALT 250 FOR IP): Performed by: STUDENT IN AN ORGANIZED HEALTH CARE EDUCATION/TRAINING PROGRAM

## 2024-06-10 PROCEDURE — 2580000003 HC RX 258: Performed by: STUDENT IN AN ORGANIZED HEALTH CARE EDUCATION/TRAINING PROGRAM

## 2024-06-10 PROCEDURE — 9990000010 HC NO CHARGE VISIT

## 2024-06-10 PROCEDURE — 6360000002 HC RX W HCPCS: Performed by: STUDENT IN AN ORGANIZED HEALTH CARE EDUCATION/TRAINING PROGRAM

## 2024-06-10 PROCEDURE — 85025 COMPLETE CBC W/AUTO DIFF WBC: CPT

## 2024-06-10 PROCEDURE — 80048 BASIC METABOLIC PNL TOTAL CA: CPT

## 2024-06-10 PROCEDURE — 36415 COLL VENOUS BLD VENIPUNCTURE: CPT

## 2024-06-10 RX ORDER — INSULIN GLARGINE 100 [IU]/ML
8 INJECTION, SOLUTION SUBCUTANEOUS NIGHTLY
Status: DISCONTINUED | OUTPATIENT
Start: 2024-06-10 | End: 2024-06-10 | Stop reason: HOSPADM

## 2024-06-10 RX ADMIN — DORZOLAMIDE HYDROCHLORIDE AND TIMOLOL MALEATE 1 DROP: 20; 5 SOLUTION/ DROPS OPHTHALMIC at 08:17

## 2024-06-10 RX ADMIN — Medication 1 APPLICATOR: at 08:17

## 2024-06-10 RX ADMIN — FUROSEMIDE 20 MG: 20 TABLET ORAL at 08:17

## 2024-06-10 RX ADMIN — PANTOPRAZOLE SODIUM 40 MG: 40 TABLET, DELAYED RELEASE ORAL at 06:13

## 2024-06-10 RX ADMIN — CEFEPIME 2000 MG: 2 INJECTION, POWDER, FOR SOLUTION INTRAVENOUS at 06:16

## 2024-06-10 RX ADMIN — INSULIN LISPRO 1 UNITS: 100 INJECTION, SOLUTION INTRAVENOUS; SUBCUTANEOUS at 11:55

## 2024-06-10 RX ADMIN — APIXABAN 5 MG: 5 TABLET, FILM COATED ORAL at 08:17

## 2024-06-10 ASSESSMENT — PAIN SCALES - WONG BAKER: WONGBAKER_NUMERICALRESPONSE: NO HURT

## 2024-06-10 ASSESSMENT — PAIN SCALES - GENERAL: PAINLEVEL_OUTOF10: 1

## 2024-06-10 NOTE — PLAN OF CARE
Problem: Discharge Planning  Goal: Discharge to home or other facility with appropriate resources     Problem: Pain  Goal: Verbalizes/displays adequate comfort level or baseline comfort level     Problem: Nutrition Deficit:  Goal: Optimize nutritional status     
  Problem: Discharge Planning  Goal: Discharge to home or other facility with appropriate resources  6/10/2024 1311 by Danielle Dash RN  Outcome: Progressing  6/10/2024 0937 by Danielle Dash RN  Outcome: Progressing     Problem: Safety - Adult  Goal: Free from fall injury  6/10/2024 1311 by Danielle Dash RN  Outcome: Progressing  6/10/2024 0937 by Danielle Dash RN  Outcome: Progressing     Problem: Skin/Tissue Integrity  Goal: Absence of new skin breakdown  Description: 1.  Monitor for areas of redness and/or skin breakdown  2.  Assess vascular access sites hourly  3.  Every 4-6 hours minimum:  Change oxygen saturation probe site  4.  Every 4-6 hours:  If on nasal continuous positive airway pressure, respiratory therapy assess nares and determine need for appliance change or resting period.  6/10/2024 1311 by Danielle Dash RN  Outcome: Progressing  6/10/2024 0937 by Danielle Dash RN  Outcome: Progressing     Problem: Chronic Conditions and Co-morbidities  Goal: Patient's chronic conditions and co-morbidity symptoms are monitored and maintained or improved  6/10/2024 1311 by Danielle Dash RN  Outcome: Progressing  6/10/2024 0937 by Danielle Dash RN  Outcome: Progressing     Problem: Pain  Goal: Verbalizes/displays adequate comfort level or baseline comfort level  6/10/2024 1311 by Danielle Dash RN  Outcome: Progressing  6/10/2024 0937 by Danielle Dash RN  Outcome: Progressing     Problem: Nutrition Deficit:  Goal: Optimize nutritional status  6/10/2024 1311 by Danielle Dash RN  Outcome: Progressing  6/10/2024 0937 by Danielle Dash RN  Outcome: Progressing     Problem: ABCDS Injury Assessment  Goal: Absence of physical injury  6/10/2024 1311 by Danielle Dash RN  Outcome: Progressing  6/10/2024 0937 by Danielle Dash RN  Outcome: Progressing     
  Problem: Discharge Planning  Goal: Discharge to home or other facility with appropriate resources  6/10/2024 1312 by Danielle Dash RN  Outcome: Completed  6/10/2024 1311 by Danielle Dash RN  Outcome: Progressing  6/10/2024 0937 by Danielle Dash RN  Outcome: Progressing     Problem: Safety - Adult  Goal: Free from fall injury  6/10/2024 1312 by Danielle Dash RN  Outcome: Completed  6/10/2024 1311 by Danielle Dash RN  Outcome: Progressing  6/10/2024 0937 by Danielle Dash RN  Outcome: Progressing     Problem: Skin/Tissue Integrity  Goal: Absence of new skin breakdown  Description: 1.  Monitor for areas of redness and/or skin breakdown  2.  Assess vascular access sites hourly  3.  Every 4-6 hours minimum:  Change oxygen saturation probe site  4.  Every 4-6 hours:  If on nasal continuous positive airway pressure, respiratory therapy assess nares and determine need for appliance change or resting period.  6/10/2024 1312 by Danielle Dash RN  Outcome: Completed  6/10/2024 1311 by Danielle Dash RN  Outcome: Progressing  6/10/2024 0937 by Danielle Dash RN  Outcome: Progressing     Problem: Chronic Conditions and Co-morbidities  Goal: Patient's chronic conditions and co-morbidity symptoms are monitored and maintained or improved  6/10/2024 1312 by Danielle Dash RN  Outcome: Completed  6/10/2024 1311 by Danielle Dash RN  Outcome: Progressing  6/10/2024 0937 by Danielle Dash RN  Outcome: Progressing     Problem: Pain  Goal: Verbalizes/displays adequate comfort level or baseline comfort level  6/10/2024 1312 by Danielle Dash RN  Outcome: Completed  6/10/2024 1311 by Danielle Dash RN  Outcome: Progressing  6/10/2024 0937 by Danielle Dash RN  Outcome: Progressing     Problem: Nutrition Deficit:  Goal: Optimize nutritional status  6/10/2024 1312 by Danielle Dash RN  Outcome: Completed  6/10/2024 1311 by Danielle Dash RN  Outcome: Progressing  6/10/2024 0937 by Danielle Dash RN  Outcome: 
  Problem: Discharge Planning  Goal: Discharge to home or other facility with appropriate resources  6/6/2024 0813 by Latrice Haque, RN  Outcome: Progressing  Flowsheets (Taken 6/5/2024 1720)  Discharge to home or other facility with appropriate resources:   Identify barriers to discharge with patient and caregiver   Identify discharge learning needs (meds, wound care, etc)   Refer to discharge planning if patient needs post-hospital services based on physician order or complex needs related to functional status, cognitive ability or social support system   Arrange for needed discharge resources and transportation as appropriate     Problem: Safety - Adult  Goal: Free from fall injury  6/6/2024 0813 by Latrice Haque, RN  Outcome: Progressing  Flowsheets (Taken 6/5/2024 1720)  Free From Fall Injury: Instruct family/caregiver on patient safety     Problem: Skin/Tissue Integrity  Goal: Absence of new skin breakdown  Description: 1.  Monitor for areas of redness and/or skin breakdown  2.  Assess vascular access sites hourly  3.  Every 4-6 hours minimum:  Change oxygen saturation probe site  4.  Every 4-6 hours:  If on nasal continuous positive airway pressure, respiratory therapy assess nares and determine need for appliance change or resting period.  6/6/2024 0813 by Latrice Haque, RN  Outcome: Progressing     Problem: Chronic Conditions and Co-morbidities  Goal: Patient's chronic conditions and co-morbidity symptoms are monitored and maintained or improved  6/6/2024 0813 by Latrice Haque RN  Outcome: Progressing  Flowsheets (Taken 6/5/2024 1720)  Care Plan - Patient's Chronic Conditions and Co-Morbidity Symptoms are Monitored and Maintained or Improved:   Monitor and assess patient's chronic conditions and comorbid symptoms for stability, deterioration, or improvement   Collaborate with multidisciplinary team to address chronic and comorbid conditions and prevent exacerbation or deterioration   Update acute care 
  Problem: Discharge Planning  Goal: Discharge to home or other facility with appropriate resources  6/8/2024 0247 by Génesis Tenorio, RN  Outcome: Progressing  Flowsheets (Taken 6/7/2024 2109)  Discharge to home or other facility with appropriate resources: Identify barriers to discharge with patient and caregiver  6/7/2024 2039 by Sylvia Flores RN  Flowsheets (Taken 6/7/2024 1600)  Discharge to home or other facility with appropriate resources:   Identify barriers to discharge with patient and caregiver   Identify discharge learning needs (meds, wound care, etc)   Refer to discharge planning if patient needs post-hospital services based on physician order or complex needs related to functional status, cognitive ability or social support system   Arrange for needed discharge resources and transportation as appropriate     Problem: Safety - Adult  Goal: Free from fall injury  Outcome: Progressing  Flowsheets (Taken 6/8/2024 0246)  Free From Fall Injury: Instruct family/caregiver on patient safety     Problem: Skin/Tissue Integrity  Goal: Absence of new skin breakdown  Description: 1.  Monitor for areas of redness and/or skin breakdown  2.  Assess vascular access sites hourly  3.  Every 4-6 hours minimum:  Change oxygen saturation probe site  4.  Every 4-6 hours:  If on nasal continuous positive airway pressure, respiratory therapy assess nares and determine need for appliance change or resting period.  Outcome: Progressing     Problem: Chronic Conditions and Co-morbidities  Goal: Patient's chronic conditions and co-morbidity symptoms are monitored and maintained or improved  Outcome: Progressing  Flowsheets (Taken 6/7/2024 2109)  Care Plan - Patient's Chronic Conditions and Co-Morbidity Symptoms are Monitored and Maintained or Improved: Monitor and assess patient's chronic conditions and comorbid symptoms for stability, deterioration, or improvement     Problem: Pain  Goal: Verbalizes/displays adequate comfort 
  Problem: Discharge Planning  Goal: Discharge to home or other facility with appropriate resources  6/9/2024 0014 by Serina Elena RN  Outcome: Progressing  Flowsheets (Taken 6/8/2024 2143)  Discharge to home or other facility with appropriate resources:   Identify barriers to discharge with patient and caregiver   Arrange for needed discharge resources and transportation as appropriate     Problem: Safety - Adult  Goal: Free from fall injury  6/9/2024 0014 by Serina Elena RN  Outcome: Progressing     Problem: Skin/Tissue Integrity  Goal: Absence of new skin breakdown  Description: 1.  Monitor for areas of redness and/or skin breakdown  2.  Assess vascular access sites hourly  3.  Every 4-6 hours minimum:  Change oxygen saturation probe site  4.  Every 4-6 hours:  If on nasal continuous positive airway pressure, respiratory therapy assess nares and determine need for appliance change or resting period.  Outcome: Progressing     Problem: Chronic Conditions and Co-morbidities  Goal: Patient's chronic conditions and co-morbidity symptoms are monitored and maintained or improved  6/9/2024 0014 by Serina Elena RN  Outcome: Progressing  Flowsheets (Taken 6/8/2024 2143)  Care Plan - Patient's Chronic Conditions and Co-Morbidity Symptoms are Monitored and Maintained or Improved: Monitor and assess patient's chronic conditions and comorbid symptoms for stability, deterioration, or improvement     Problem: Pain  Goal: Verbalizes/displays adequate comfort level or baseline comfort level  6/9/2024 0014 by Serina Elena RN  Outcome: Progressing  Flowsheets (Taken 6/8/2024 2143)  Verbalizes/displays adequate comfort level or baseline comfort level:   Encourage patient to monitor pain and request assistance   Assess pain using appropriate pain scale   Administer analgesics based on type and severity of pain and evaluate response   Implement non-pharmacological measures as appropriate and evaluate 
  Problem: Discharge Planning  Goal: Discharge to home or other facility with appropriate resources  6/9/2024 1112 by Danielle Dash RN  Outcome: Progressing  6/9/2024 0014 by Serina Elena RN  Outcome: Progressing  Flowsheets (Taken 6/8/2024 2143)  Discharge to home or other facility with appropriate resources:   Identify barriers to discharge with patient and caregiver   Arrange for needed discharge resources and transportation as appropriate     Problem: Safety - Adult  Goal: Free from fall injury  6/9/2024 1112 by Danielle Dash RN  Outcome: Progressing  6/9/2024 0014 by Serina Elena RN  Outcome: Progressing     Problem: Skin/Tissue Integrity  Goal: Absence of new skin breakdown  Description: 1.  Monitor for areas of redness and/or skin breakdown  2.  Assess vascular access sites hourly  3.  Every 4-6 hours minimum:  Change oxygen saturation probe site  4.  Every 4-6 hours:  If on nasal continuous positive airway pressure, respiratory therapy assess nares and determine need for appliance change or resting period.  6/9/2024 1112 by Danielle Dash RN  Outcome: Progressing  6/9/2024 0014 by Serina Elena RN  Outcome: Progressing     Problem: Chronic Conditions and Co-morbidities  Goal: Patient's chronic conditions and co-morbidity symptoms are monitored and maintained or improved  6/9/2024 1112 by Danielle Dash RN  Outcome: Progressing  6/9/2024 0014 by Serina Elena RN  Outcome: Progressing  Flowsheets (Taken 6/8/2024 2143)  Care Plan - Patient's Chronic Conditions and Co-Morbidity Symptoms are Monitored and Maintained or Improved: Monitor and assess patient's chronic conditions and comorbid symptoms for stability, deterioration, or improvement     Problem: Pain  Goal: Verbalizes/displays adequate comfort level or baseline comfort level  6/9/2024 1112 by Danielle Dash RN  Outcome: Progressing  6/9/2024 0014 by Serina Elena RN  Outcome: Progressing  Flowsheets (Taken 
  Problem: Discharge Planning  Goal: Discharge to home or other facility with appropriate resources  6/9/2024 2042 by Serina Elena RN  Outcome: Progressing  Flowsheets (Taken 6/9/2024 2017)  Discharge to home or other facility with appropriate resources:   Identify barriers to discharge with patient and caregiver   Arrange for needed discharge resources and transportation as appropriate     Problem: Safety - Adult  Goal: Free from fall injury  6/9/2024 2042 by Serina Elena RN  Outcome: Progressing     Problem: Skin/Tissue Integrity  Goal: Absence of new skin breakdown  Description: 1.  Monitor for areas of redness and/or skin breakdown  2.  Assess vascular access sites hourly  3.  Every 4-6 hours minimum:  Change oxygen saturation probe site  4.  Every 4-6 hours:  If on nasal continuous positive airway pressure, respiratory therapy assess nares and determine need for appliance change or resting period.  6/9/2024 2042 by Serina Elena RN  Outcome: Progressing     Problem: Chronic Conditions and Co-morbidities  Goal: Patient's chronic conditions and co-morbidity symptoms are monitored and maintained or improved  6/9/2024 2042 by Serina Elena RN  Outcome: Progressing  Flowsheets (Taken 6/9/2024 2017)  Care Plan - Patient's Chronic Conditions and Co-Morbidity Symptoms are Monitored and Maintained or Improved: Monitor and assess patient's chronic conditions and comorbid symptoms for stability, deterioration, or improvement     Problem: Pain  Goal: Verbalizes/displays adequate comfort level or baseline comfort level  6/9/2024 2042 by Serina Elena RN  Outcome: Progressing  Flowsheets (Taken 6/9/2024 2030)  Verbalizes/displays adequate comfort level or baseline comfort level:   Encourage patient to monitor pain and request assistance   Assess pain using appropriate pain scale   Administer analgesics based on type and severity of pain and evaluate response   Implement 
  Problem: Safety - Adult  Goal: Free from fall injury  6/8/2024 0247 by Génesis Tenorio,  Problem: Chronic Conditions and Co-morbidities  Goal: Patient's chronic conditions and co-morbidity symptoms are monitored and maintained or improved  6/8/2024 0247 by Génesis Tenorio RN  Outcome: Progressing     Problem: Pain  Goal: Verbalizes/displays adequate comfort level or baseline comfort level  6/8/2024 0247 by Génesis Tenorio RN  Outcome: Progressing     Problem: Nutrition Deficit:  Goal: Optimize nutritional status  6/8/2024 0247 by Génesis Tenorio RN  Outcome: Progressing        Problem: Discharge Planning  Goal: Discharge to home or other facility with appropriate resources  6/8/2024 0247 by Génesis Tenorio RN  Outcome: Progressing  Problem: ABCDS Injury Assessment  Goal: Absence of physical injury  6/8/2024 0247 by Génesis Tenorio RN  Outcome: Progressing        
(Taken 6/9/2024 2030)  Verbalizes/displays adequate comfort level or baseline comfort level:   Encourage patient to monitor pain and request assistance   Assess pain using appropriate pain scale   Administer analgesics based on type and severity of pain and evaluate response   Implement non-pharmacological measures as appropriate and evaluate response     Problem: Nutrition Deficit:  Goal: Optimize nutritional status  6/10/2024 0937 by Danielle Dash, RN  Outcome: Progressing  6/9/2024 2042 by Serina Elena RN  Outcome: Progressing     Problem: ABCDS Injury Assessment  Goal: Absence of physical injury  6/10/2024 0937 by Danielle Dash, RN  Outcome: Progressing  6/9/2024 2042 by Serina Elena, RN  Outcome: Progressing  Flowsheets (Taken 6/9/2024 2035)  Absence of Physical Injury: Implement safety measures based on patient assessment     
Verbalizes/displays adequate comfort level or baseline comfort level  Outcome: Progressing  Flowsheets (Taken 6/5/2024 1720)  Verbalizes/displays adequate comfort level or baseline comfort level:   Encourage patient to monitor pain and request assistance   Administer analgesics based on type and severity of pain and evaluate response   Assess pain using appropriate pain scale   Implement non-pharmacological measures as appropriate and evaluate response   Notify Licensed Independent Practitioner if interventions unsuccessful or patient reports new pain     
response   Assess pain using appropriate pain scale   Implement non-pharmacological measures as appropriate and evaluate response   Notify Licensed Independent Practitioner if interventions unsuccessful or patient reports new pain  6/5/2024 1720 by Latrice Haque RN  Outcome: Progressing  Flowsheets (Taken 6/5/2024 1720)  Verbalizes/displays adequate comfort level or baseline comfort level:   Encourage patient to monitor pain and request assistance   Administer analgesics based on type and severity of pain and evaluate response   Assess pain using appropriate pain scale   Implement non-pharmacological measures as appropriate and evaluate response   Notify Licensed Independent Practitioner if interventions unsuccessful or patient reports new pain

## 2024-06-10 NOTE — PROGRESS NOTES
V2.0    [facility] [place]      Name:  Fred Dorman /Age/Sex: 1945  (78 y.o. male)   MRN & CSN:  [id number] & [id number] Encounter Date/Time: 6/10/2024 8:57 AM EDT   Location:  [id number] PCP: Linda Waterman APRN - [facility]     Attending:Isidoro Mujica MD       Hospital Day: 6      HPI :   Chief Complaint: fall     Fred Dorman is a 78 y.o. 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      Patient resides at assisted living at baseline, Patient reported that he had fallen 3 times in the last few days, he reported that he had fell off the bed to the floor, patient had reported feeling weak over the last few days but denied any chest pain, shortness of breath, fever.   He did report a chronic cough.         Patient resides at [facility]  , since his wife passed last November.     Subjective:   Pleasant this morning, denies any complaints.   Agreed to ECF, precert pending   Review of Systems:      Pertinent positives and negatives discussed in HPI    Objective:     Intake/Output Summary (Last 24 hours) at 6/10/2024 1103  Last data filed at 6/10/2024 0628  Gross per 24 hour   Intake 660 ml   Output 850 ml   Net -190 ml        Vitals:   Vitals:    24 1953 06/10/24 0703 06/10/24 0801 06/10/24 0938   BP: (!) 148/80 105/66  105/66   Pulse: 83 69  69   Resp: 16 16 16 16   Temp: 97.9 °F (36.6 °C) 97.9 °F (36.6 °C)  97.9 °F (36.6 °C)   TempSrc:    Oral   SpO2: 97% 93% 94%    Weight:       Height:             Physical Exam:      Physical Exam Performed:    /66   Pulse 69   Temp 97.9 °F (36.6 °C) (Oral)   Resp 16   Ht 1.803 m (5' 11\")   Wt 90.5 kg (199 lb 8.3 oz)   SpO2 94%   BMI 27.83 kg/m²     General appearance:  dysarthric with left sided weakness ( baseline)  HEENT:  Normal cephalic, atraumatic without 
    V2.0    [facility] [place] Progress Note      Name:  Fred Dorman /Age/Sex: 1945  (78 y.o. male)   MRN & CSN:  [id number] & [id number] Encounter Date/Time: 2024 8:57 AM EDT   Location:  [id number]/3121-01 PCP: Linda Waterman APRN - [facility]     Attending:Isidoro Mujica MD       Hospital Day: 2      HPI :   Chief Complaint: fall     Fred Dorman is a 78 y.o. 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      Patient resides at [facility] living at baseline, Patient reported that he had fallen 3 times in the last few days, he reported that he had fell off the bed to the floor, patient had reported feeling weak over the last few days but denied any chest pain, shortness of breath, fever.   He did report a chronic cough.         Patient resides at [facility]  , since his wife passed last November.     Subjective:   Denies any new complaints this morning.   Alert and oriented.     Review of Systems:      Pertinent positives and negatives discussed in HPI    Objective:     Intake/Output Summary (Last 24 hours) at 2024 0741  Last data filed at 2024 0057  Gross per 24 hour   Intake 790 ml   Output 600 ml   Net 190 ml      Vitals:   Vitals:    24 1616 24 1947 24 0508 24 0716   BP: 136/77 138/69  (!) 153/84   Pulse: 72 82  67   Resp: 18 17  18   Temp: 97.8 °F (36.6 °C) 98 °F (36.7 °C)  98 °F (36.7 °C)   TempSrc: Oral Oral  Oral   SpO2: 96% 96%  93%   Weight:   96 kg (211 lb 10.3 oz)    Height:             Physical Exam:      Physical Exam Performed:    BP (!) 153/84   Pulse 67   Temp 98 °F (36.7 °C) (Oral)   Resp 18   Ht 1.803 m (5' 11\")   Wt 96 kg (211 lb 10.3 oz)   SpO2 93%   BMI 29.52 kg/m²     General appearance:  dysarthric with left sided weakness ( baseline)  HEENT:  Normal cephalic, atraumatic 
    V2.0    [facility] [place] Progress Note      Name:  Fred Dorman /Age/Sex: 1945  (78 y.o. male)   MRN & CSN:  [id number] & [id number] Encounter Date/Time: 2024 8:57 AM EDT   Location:  [id number]/3121-01 PCP: Linda Waterman APRN - [facility]     Attending:Isidoro Mujica MD       Hospital Day: 5      HPI :   Chief Complaint: fall     Fred Dorman is a 78 y.o. 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      Patient resides at assisted living at baseline, Patient reported that he had fallen 3 times in the last few days, he reported that he had fell off the bed to the floor, patient had reported feeling weak over the last few days but denied any chest pain, shortness of breath, fever.   He did report a chronic cough.         Patient resides at [facility]  , since his wife passed last November.     Subjective:   Pleasant this morning, denies any complaints.     Review of Systems:      Pertinent positives and negatives discussed in HPI    Objective:     Intake/Output Summary (Last 24 hours) at 2024 1231  Last data filed at 2024 0620  Gross per 24 hour   Intake 600 ml   Output 1050 ml   Net -450 ml        Vitals:   Vitals:    24 1945 24 0703 24 0714 24 1116   BP: (!) 147/77 126/80  (!) 126/0   Pulse: 74 69  69   Resp: 16 16  16   Temp: 97.8 °F (36.6 °C) 97.8 °F (36.6 °C)  97.8 °F (36.6 °C)   TempSrc:    Oral   SpO2: 94% 93%     Weight:   90.5 kg (199 lb 8.3 oz)    Height:             Physical Exam:      Physical Exam Performed:    BP (!) 126/0   Pulse 69   Temp 97.8 °F (36.6 °C) (Oral)   Resp 16   Ht 1.803 m (5' 11\")   Wt 90.5 kg (199 lb 8.3 oz)   SpO2 93%   BMI 27.83 kg/m²     General appearance:  dysarthric with left sided weakness ( baseline)  HEENT:  Normal cephalic, atraumatic without obvious 
  Physician Progress Note      PATIENT:               GORDON PEREZ  CSN #:                  [id number]  :                       1945  ADMIT DATE:       2024 7:37 AM  DISCH DATE:  RESPONDING  PROVIDER #:        Isidoro Sood MD          QUERY TEXT:    Patient admitted with suspected gram negative pneumonia. Pro-BNP 3462.  CXR   shows CHF.  ECHO done 2022 in Care Everywhere shows EF 65-70%.   If   possible, please document in progress notes and discharge summary if you are   evaluating and/or treating any of the following:    The medical record reflects the following:  Risk Factors: afib, CKD  Clinical Indicators: Pro-BNP 3462.  CXR shows CHF.  ECHO done 2022 in Care   Everywhere shows EF 65-70%.  Treatment: Pro-BNP, CXR, on PO Lasix as takes at home, I&O, daily weight  Options provided:  -- Acute on Chronic Diastolic CHF/HFpEF  -- Chronic Diastolic CHF/HFpEF  -- Other - I will add my own diagnosis  -- Disagree - Not applicable / Not valid  -- Disagree - Clinically unable to determine / Unknown  -- Refer to Clinical Documentation Reviewer    PROVIDER RESPONSE TEXT:    This patient has chronic diastolic CHF/HFpEF.    Query created by: Aisha Dennis on 2024 1:05 PM      QUERY TEXT:    Patient admitted with suspected gram negative pneumonia.  Sepsis documented by   ED provider only.  WBC 16.5 and lactic acid 3.2 on arrival.  One blood   culture showing \"Gram positive cocci in clusters resembling Staphylococcus.\"    If possible, please document in the progress notes and discharge summary if   sepsis was:    The medical record reflects the following:  Risk Factors: suspected gram negative pneumonia  Clinical Indicators: WBC 16.5 and lactic acid 3.2 on arrival.  One blood   culture showing \"Gram positive cocci in clusters  resembling Staphylococcus.\"  Treatment: blood & urine culture, Maxipime  Options provided:  -- Sepsis due to pneumonia present on admission  -- Sepsis ruled out 
4 Eyes Skin Assessment     NAME:  Fred Dorman  YOB: 1945  MEDICAL RECORD NUMBER:  [id number]    The patient is being assessed for  Admission    I agree that at least one RN has performed a thorough Head to Toe Skin Assessment on the patient. ALL assessment sites listed below have been assessed.      Areas assessed by both nurses:    Head, Face, Ears, Shoulders, Back, Chest, Arms, Elbows, Hands, Sacrum. Buttock, Coccyx, Ischium, Legs. Feet and Heels, and Under Medical Devices         Does the Patient have a Wound? Yes wound(s) were present on assessment. LDA wound assessment was Initiated and completed by RN       Alen Prevention initiated by RN: Yes  Wound Care Orders initiated by RN: Yes    Pressure Injury (Stage 3,4, Unstageable, DTI, NWPT, and Complex wounds) if present, place Wound referral order by RN under : No    New Ostomies, if present place, Ostomy referral order under : No     Nurse 1 eSignature: Electronically signed by Latrice Haque RN on 6/5/24 at 7:09 PM EDT    **SHARE this note so that the co-signing nurse can place an eSignature**    Nurse 2 eSignature: Electronically signed by Claritza Suárez RN on 6/5/24 at 7:19 PM EDT    
Checking on patient Q2H for nutrition needs, hygiene needs, comfort measures, mobility, fall risk interventions, and safe environment. All precautions and interventions in place. Educated patient on use of call light and telephone. Patient verbalizes understanding. Call light/telephone in reach.   
Checking on patient Q2H for nutrition needs, hygiene needs, comfort measures, mobility, fall risk interventions, and safe environment. All precautions and interventions in place. Educated patient on use of call light and telephone. Patient verbalizes understanding. Call light/telephone in reach.Electronically signed by NAZANIN DELGADO RN on 6/9/2024 at 11:12 AM   
Clinical Pharmacy Note  Dose Adjustment    Fred Dorman is receiving cefepime for CAP. Based on the patient's Estimated Creatinine Clearance: Estimated Creatinine Clearance: 61 mL/min (based on SCr of 1.2 mg/dL). urine output and indication, the dose has been adjusted to 2000mg q8h per protocol.    Pharmacy will continue to monitor and adjust dose as needed for changes in renal function.    Maxx Ashton [facility], 6/5/2024 2:59 PM    
Comprehensive Nutrition Assessment    Type and Reason for Visit:  Wound    Nutrition Recommendations/Plan:   Continue current diet.   Start Otf BID     Malnutrition Assessment:  Malnutrition Status:  No malnutrition (06/06/24 8707)    Context:  Acute Illness       Nutrition Assessment:    RD assessment for wounds. Pt. admitted for bacterial pneumonia. Continues on a ENRIQUE diet. Diet acceptance appears okay so far. Stage II noted to sacrum. Recommend start Otf BID to support wound healing. No Hx. of weight loss, healthy weight per BMI. Will continue to monitor nutritional adequacy and diet acceptance.    Nutrition Related Findings:    BUN 24, GFR 56, BG . LBM 6/3. Skin w/ area to sacrum. Wound Type: Stage II       Current Nutrition Intake & Therapies:    Average Meal Intake: 51-75%, %  Average Supplements Intake: None Ordered  ADULT DIET; Regular; No Added Salt (3-4 gm)    Anthropometric Measures:  Height: 180.3 cm (5' 11\")  Ideal Body Weight (IBW): 172 lbs (78 kg)       Current Body Weight: 96 kg (211 lb 10.3 oz),   IBW.    Current BMI (kg/m2): 29.5                          BMI Categories: Overweight (BMI 25.0-29.9)    Estimated Daily Nutrient Needs:  Energy Requirements Based On: Kcal/kg  Weight Used for Energy Requirements: Current  Energy (kcal/day): 4587-2607 kcal (20-25 kcal/kg)  Weight Used for Protein Requirements: Current  Protein (g/day):  g (1-1.3g/kg)  Method Used for Fluid Requirements: 1 ml/kcal  Fluid (ml/day): 440    Nutrition Diagnosis:   Increased nutrient needs related to increase demand for energy/nutrients as evidenced by wounds    Nutrition Interventions:   Food and/or Nutrient Delivery: Continue Current Diet, Start Oral Nutrition Supplement  Nutrition Education/Counseling: Education not indicated  Coordination of Nutrition Care: Continue to monitor while inpatient       Goals:     Goals: PO intake 75% or greater       Nutrition Monitoring and Evaluation: 
Medication Reconciliation    List of medications patient is currently taking is complete.     Source of information: 1. [facility] list and conversation with patient                                      2. EPIC records       Austin Mcbride [facility]   6/5/2024  12:49 PM    
Occupational Therapy  Facility/Department: [address] ORTHOPEDICS  Occupational Therapy Initial Assessment    Name: Fred Dorman  : 1945  MRN: [id number]  Date of Service: 2024    Discharge Recommendations:  Patient would benefit from continued therapy after discharge, 3-5 sessions per week        Fred Dorman scored a 16/24 on the AM-PAC ADL Inpatient form. Current research shows that an AM-PAC score of 17 or less is typically not associated with a discharge to the patient's home setting. Based on the patient's AM-PAC score and their current ADL deficits, it is recommended that the patient have 3-5 sessions per week of Occupational Therapy at d/c to increase the patient's independence.  Please see assessment section for further patient specific details.    If patient discharges prior to next session this note will serve as a discharge summary.  Please see below for the latest assessment towards goals.      Patient Diagnosis(es): The primary encounter diagnosis was Septicemia (HCC). Diagnoses of Hypoglycemia, Elevated troponin, and Acute respiratory failure with hypoxia (HCC) were also pertinent to this visit.  Past Medical History:  has a past medical history of Arthritis, Atrial fibrillation (HCC), Brain bleed (HCC), Brain tumor (HCC), Cancer (HCC), Cerebral artery occlusion with cerebral infarction (HCC), CHF (congestive heart failure) (HCC), Chronic renal disease, Deaf, Diabetes (HCC), Diabetes mellitus (HCC), Endocarditis, High blood pressure, History of blood transfusion, Hyperlipidemia, Left-sided weakness, Peptic ulcer, Sick sinus syndrome (HCC), and Slurred speech.  Past Surgical History:  has a past surgical history that includes tumor excision; Appendectomy (age 5); Tonsillectomy (age 8); Upper gastrointestinal endoscopy (); craniotomy (Right, 2021); craniotomy (Right, 2021); Cataract removal with implant (Bilateral); brow lift; Cardiac surgery (); Eye surgery (Left, 2022); 
[facility]  Diabetes Education   Progress Note       NAME:  Fred Dorman  MEDICAL RECORD NUMBER:  [id number]  AGE: 78 y.o.   GENDER: male  : 1945  TODAY'S DATE:  2024    Subjective   Reason for Diabetic Education Evaluation and Assessment: hypoglycemia    Fred reports that he does not feel his blood blood sugars at home.  He reports levels as low as 40.      Visit Type: evaluation      Fred Dorman is a 78 y.o. 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    Diabetes (HCC)     Diabetes mellitus (HCC)     Endocarditis late     High blood pressure     History of blood transfusion     Hyperlipidemia     Left-sided weakness     Peptic ulcer     stopped aspirin    Sick sinus syndrome (HCC)     Slurred speech        PAST SURGICAL HISTORY    Past Surgical History:   Procedure Laterality Date    APPENDECTOMY  age 5    BROW LIFT      browplasty    CARDIAC SURGERY  2012    repair heart valve surgery at [facility] Mitral valve    CATARACT REMOVAL WITH IMPLANT Bilateral     CRANIOTOMY Right 2021    TREPHINE CRANIOTOMY FOR SUBDURAL EVACUATION performed by Mitul Allen MD at [facility] OR    CRANIOTOMY Right 2021    EXPOSE AND REPLACE PROGRAMMABLE SHUNT VALVE performed by Mitul Allen MD at [facility] OR    EYE SURGERY Left 2022    LEFT LOWER LID ECTROPION REPAIR WITH CANTHOPLASTY - LEFT EYE performed by Radha Harris MD at [facility]    EYE SURGERY 
[facility]  Glycemic Control      NAME: Fred Dorman  MEDICAL RECORD NUMBER:  [id number]  AGE: 78 y.o.   GENDER: male  : 1945  EPISODE DATE:  6/10/2024     Data     Recent Labs     24  1945 24  0705 24  1143 24  1605 24  1955 06/10/24  0704   POCGLU 286* 205* 311* 111* 217* 192*       HgBA1c:    Lab Results   Component Value Date/Time    LABA1C 6.5 2022 02:43 PM       BUN/Creatinine:    Lab Results   Component Value Date/Time    BUN 26 06/10/2024 04:59 AM    CREATININE 1.2 06/10/2024 04:59 AM       Medications  Scheduled Medications:   cefepime  2,000 mg IntraVENous Q12H    sodium chloride flush  5-40 mL IntraVENous 2 times per day    dorzolamide-timolol  1 drop Both Eyes BID    apixaban  5 mg Oral BID    furosemide  20 mg Oral Daily    pantoprazole  40 mg Oral QAM AC    pravastatin  40 mg Oral Nightly    tamsulosin  0.4 mg Oral Nightly    insulin lispro  0-4 Units SubCUTAneous TID WC    insulin lispro  0-4 Units SubCUTAneous Nightly    tolnaftate  1 each Topical BID       Diet  Current diet/supplement order: ADULT DIET; Regular; No Added Salt (3-4 gm)  ADULT ORAL NUTRITION SUPPLEMENT; Lunch, Dinner; Wound Healing Oral Supplement     Recorded PO: PO Meals Eaten (%): 76 - 100% last meal in flowsheets      Action      Glucose Target: 140-180, avoid hypoglycemia    Total Daily Insulin: (Lantus has not been given since admission)  6/10/2024: 0 insulin as of 1150am  2024: 4 units    Recommend: Restarting Lantus (per AVS Lantus will be restarted at bedtime), continue low dose SSI. Monitor intake and glucoses.   Renal Basal weight-based dosin.5 units, Renal Prandial weight-based dosin.5 units TID with meals)     Electronically signed by Rosa Millan RN on 6/10/2024 at 11:47 AM  
Patient awake and resting in bed. Alert and oriented x4. Assessment complete. Tolerated PM meds well. IV infiltrated. Awaiting replacement as this RN was unable to get IV access. Denies pain at this time. Call light within reach. Able to make needs known. Will continue with plan of care.   
Patient blood sugar 63. Gave glucose tablets at 0728. Recheck was 78. Patient now drinking orange juice with breakfast.   
Patient is alert & oriented x4, resting in bed, on room air. Gets up with stedy x1 assist. IV abx infusing in L forearm IV. 2/4 bed rails up, bed in lowest position, fall precautions in place, bed alarm on, call light within reach. Morning medications given as ordered, tolerated well. No complaints of pain. Patient denies further needs at this time. Will cont to monitor and reassess.  Electronically signed by Latrice Haque RN on 6/6/2024 at 12:44 PM    
Patient resting and sleeping at intervals . Patient denies pain at the present time.  Respirations regular and unlabored on room air. Patient repositioned for comfort.   Fall/Safety Precautions in place. Call light in reach.  Electronically signed by Sylvia Flores RN on 6/8/2024 at 8:17 PM    
Patient up at bedside to void per urinal with assistance. Patent alert and oriented  X 4.  Respirations regular and unlabored on room air. Patient repositioned in bed. Fall/safety precautions in place.  all light in reach. Electronically signed by Sylvia Flores RN on 6/8/2024 at 3:44 PM      
Patient up at bedside with walker and stand -by assist to void per urinal. Patient alert and oriented x 4. Respirations regular and unlabored . Fall precautions in place. Call light in reach. Patient resting in bed at present. Patient denies the need for medication.  Electronically signed by Sylvia Flores RN on 6/7/2024 at 9:01 PM    
Patient up to bathroom with steady x1  . Berna care given . Triad cream applied to buttocks/sacrum area. Patient tolerated activity well.  Patient returned to bed. Fall/safety precautions in place.  Electronically signed by Sylvia Flores RN on 6/7/2024 at 8:58 PM    
Physical Therapy  Facility/Department: [address] ORTHOPEDICS  Physical Therapy Initial Assessment  This note serves as patient discharge summary if pt discharges prior to next PT visit      Name: Fred Droman  : 1945  MRN: [id number]  Date of Service: 2024    Discharge Recommendations:  Therapy recommended at discharge (3-5)     Fred Dorman scored a 15/24 on the AM-PAC short mobility form. Current research shows that an AM-PAC score of 17 or less is typically not associated with a discharge to the patient's home setting. Based on the patient's AM-PAC score and their current functional mobility deficits, it is recommended that the patient have 3-5 sessions per week of Physical Therapy at d/c to increase the patient's independence.  Please see assessment section for further patient specific details.       PT Equipment Recommendations  Other: monitor      Fred Dorman scored a 15/24 on the AM-PAC short mobility form. Current research shows that an AM-PAC score of 17 or less is typically not associated with a discharge to the patient's home setting. Based on the patient's AM-PAC score and their current functional mobility deficits, it is recommended that the patient have 3-5 sessions per week of Physical Therapy at d/c to increase the patient's independence.  Please see assessment section for further patient specific details.      Patient Diagnosis(es): The primary encounter diagnosis was Septicemia (HCC). Diagnoses of Hypoglycemia, Elevated troponin, and Acute respiratory failure with hypoxia (HCC) were also pertinent to this visit.  Past Medical History:  has a past medical history of Arthritis, Atrial fibrillation (HCC), Brain bleed (HCC), Brain tumor (HCC), Cancer (HCC), Cerebral artery occlusion with cerebral infarction (HCC), CHF (congestive heart failure) (HCC), Chronic renal disease, Deaf, Diabetes (HCC), Diabetes mellitus (HCC), Endocarditis, High blood pressure, History of blood transfusion, 
Pt AAO x4.  No c/o pain.  Assessment completed and charted.  Took pm meds without any difficulty.  Denies any needs.  Call light in reach.  Will monitor.   
Pt AAO x4.  No c/o pain.  Assessment completed.  Rash/redness noted to back.  Pt states this is from our linens and happened to him last time he was hospitalized.  Silicone cream applied to back.  No c/o SOB.  States he does have an occasional productive cough, but he is swallowing his sputum so unsure of what it looks like.  Lungs diminished on the left side.  O2 sats 94% on RA.  Denies any needs at this time.  Call light in reach.  Will monitor.   
Pt arrived to unit at 1603. Pt is alert and oriented X4. Pt oriented to unit and to room. Pt oriented to call light and to phone. White board updated. Pt denies any further needs at this time. Will continue to monitor and assess.   Electronically signed by Latrice Haque RN on 6/5/2024 at 4:05 PM    
11:29 AM     Lab Results   Component Value Date/Time    BLOODCULT2  06/05/2024 11:29 AM     No Growth to date.  Any change in status will be called.     Organism:   Lab Results   Component Value Date/Time    ORG Staphylococcus coagulase negative DNA Detected 06/05/2024 11:29 AM         Assessment and Recommendations   Fred Dorman is a 78 y.o. male who presents with fall.     Fall.  -Patient with multiple episodes of fall from bed at his assisted living facility.  CT head negative.     Bacterial pneumonia with gram-negative risk factors.  Patient presented to emergency following a fall however was found to be hypoxic, requiring 2 L of oxygen via nasal cannula, CT chest was subsequently performed showing patchy airspace disease in the left lower lobe concerning for infection with tree-in-bud micronodules.  Blood work significant for leukocytosis of 16.3.    WBC improving 9.4 today.   Procal 0.27.   Weaned off oxygen.   MRSA swab negative   Plan.  -continue  on cefepime.   -Close clinical observation.  -Send procalcitonin  -Follow blood cultures. ( Pending)   - will trend procal      Incidental thyroid nodule  To be followed by PCP     Diabetes mellitus on insulin  Was found to be hypoglycemic on presentation. ( With continued hypoglycemia today)  Hold glargine and continue on sliding scale for now  Check glucose 4 times daily.      Hyperlipidemia.   Continue home medications.      History of brain tumor s/p resection with residual left-sided weakness.   PT/OT ordered.   Lives in assisted living at baseline     Baseline medications   Continue home Lasix, echo in 2022 with preserved ejection fraction.     Atrial fibrillation.  Continue Eliquis      Diet ADULT DIET; Regular; No Added Salt (3-4 gm)  ADULT ORAL NUTRITION SUPPLEMENT; Lunch, Dinner; Wound Healing Oral Supplement     DVT Prophylaxis [] Lovenox, []  Heparin, [] SCDs, [] Ambulation,  [] Eliquis, [] Xarelto  [] Coumadin   Code Status Full Code   Disposition

## 2024-06-10 NOTE — CARE COORDINATION
06/10/24 1300   IMM Letter   IMM Letter given to Patient/Family/Significant other/Guardian/POA/by: 2nd IMM explained to pt- he declined need for a copy per JURGEN Liu RN   IMM Letter date given: 06/10/24   IMM Letter time given: 1302     Education provided to patient. Patient reported no questions and verbalized understanding. Patient made aware that he/she has 4 hours prior to discharging from hospital to decide if he/she wishes to pursue the Medicare appeal process.      Electronically signed by Carrie Liu on 6/10/2024 at 1:02 PM  #[phone]  
DISCHARGE PLANNIN  Discharge order noted.  This CM received a call ADON at [facility] ([facility]) who stated they cannot take patient back at this time due to frequent falls and the need for further rehab. They are agreeable to accept him back after a SNF stay.  Phone number for [facility] is [phone]    I met with patient at bedside to discuss the above. He stated understanding and is agreeable to SNF placement.   He has been to [facility] in the past and would like referral sent there (sent via Epic).  Chart review showed he has also been to [facility] in the past, but he stated he does not want to go back there.      UPDATE: 2810  This CM received a call from Delia at [facility] who stated they can ACCEPT patient.    Adriana pre-cert initiated.  Auth ID# [id number]   Current status is PENDING clinical review.      CM team will continue to follow.  Megan Linda, RN Case Manager  [phone]  
DISCHARGE SUMMARY     DATE OF DISCHARGE: 6/10/24    DISCHARGE DESTINATION: SKILLED at [facility]    FACILITY    Level of Care: Skilled  Discharging to Facility/ Agency   Name: [facility]  Address:  [address], [place] [address]   Phone:  [phone]  Fax:  [phone]  Precert Obtained: yes    Hens Completed: yes    PASARR: N/A    Notified: RN, Family, and Facility/Agency  Patient aware and agreeable    TRANSPORTATION: St[facility]cher    Company Name: [facility]     Time: 3:00PM    Phone Number: [phone]    Electronically signed by Carrie Liu on 6/10/2024 at 1:11 PM  #[phone]  
[facility] portal- precert remains pending   Electronically signed by Carrie Liu on 6/10/2024 at 7:43 AM  #[phone]  
Discharge Planning:  Patient refusing to go to Skilled Nursing facility.  MARIO called [facility] and spoke with one of the RN's Rosaura, the DON will need to assess to determine if patient can return to Assisted Living - she will call case management  on Xavier morning.  If able to return will need transportation back to [facility].  Phone # for [facility] is [phone].  
FIDEL AUTHORIZATION INFORMATION      LOCATION:  [facility]     EFFECTIVE DATE:  6/10/24 -6/17/24    NEXT REVIEW DATE:  6/17/2024    AUTH#:  Auth ID# [id number]     Electronically signed by Carrie Liu on 6/10/2024 at 12:43 PM  #[phone]    
Worker  [phone]    Electronically signed by TAVON Patel, CHRISTINEW on 6/5/2024 at 4:03 PM

## (undated) DEVICE — SUTURE PERMAHAND SZ 2-0 L12X18IN NONABSORBABLE BLK SILK A185H

## (undated) DEVICE — GLOVE SURG SZ 6 L12IN FNGR THK75MIL WHT LTX POLYMER BEAD

## (undated) DEVICE — ADHESIVE SKIN CLSR 0.7ML TOP DERMBND ADV

## (undated) DEVICE — SOLUTION IV 1000ML 0.9% SOD CHL

## (undated) DEVICE — GLOVE SURG SZ 65 CRM LTX FREE POLYISOPRENE POLYMER BEAD ANTI

## (undated) DEVICE — UNDERGLOVE SURG SZ 8 BLU LTX FREE SYN POLYISOPRENE POLYMER

## (undated) DEVICE — DECANTER BAG 9": Brand: MEDLINE INDUSTRIES, INC.

## (undated) DEVICE — SOLUTION IV IRRIG 250ML ST LF 0.9% SODIUM 2F7122

## (undated) DEVICE — BLADE CLIPPER SURG SENSICLIP

## (undated) DEVICE — GAUZE SPONGES,12 PLY: Brand: CURITY

## (undated) DEVICE — GLOVE SURG SZ 65 L12IN FNGR THK87MIL WHT LTX FREE

## (undated) DEVICE — COVER,TABLE,HEAVY DUTY,77"X90",STRL: Brand: MEDLINE

## (undated) DEVICE — SPONGE GZ W4XL4IN COT 12 PLY TYP VII WVN C FLD DSGN

## (undated) DEVICE — SUTURE ABSORBABLE BRAIDED 6-0 P-3 18 IN UD VICRYL J492G

## (undated) DEVICE — COUNTER NDL 40 COUNT HLD 70 NUM FOAM BLK SGL MAG W BLDE REMV

## (undated) DEVICE — PRESSURE TUBING: Brand: TRUWAVE

## (undated) DEVICE — STAPLER SKIN H3.9MM WIRE DIA0.58MM CRWN 6.9MM 35 STPL ROT

## (undated) DEVICE — JEWISH CRANI PACK: Brand: MEDLINE INDUSTRIES, INC.

## (undated) DEVICE — SOLUTION IV 50ML 0.9% SOD CHL PLAS CONT USP VIAFLX

## (undated) DEVICE — TOOL F2/8TA23 LEGEND 8CM 2.3MM TAPER: Brand: MIDAS REX™

## (undated) DEVICE — 3L THIN WALL CAN: Brand: CRD

## (undated) DEVICE — PLATE ES AD W 9FT CRD 2

## (undated) DEVICE — STYLET GUID PRECALIBRATED INCORPORATED PASS MRK PLT DISP

## (undated) DEVICE — SET BLOOD COLL 25GA L0.75N WITHOUT HLDR VNPNCTRE WTH ADPTR M

## (undated) DEVICE — SUTURE BOOT: Brand: DEROYAL

## (undated) DEVICE — KIT CATHETER 20GA L12CM ART CUST

## (undated) DEVICE — MARKER STERILE TWIN TIP

## (undated) DEVICE — SOLUTION IV 500ML 0.9% SOD CHL PH 5 INJ USP VIAFLX PLAS

## (undated) DEVICE — TOWEL,OR,DSP,ST,WHITE,DLX,4/PK,20PK/CS: Brand: MEDLINE

## (undated) DEVICE — INTENDED FOR TISSUE SEPARATION, AND OTHER PROCEDURES THAT REQUIRE A SHARP SURGICAL BLADE TO PUNCTURE OR CUT.: Brand: BARD-PARKER ® STAINLESS STEEL BLADES

## (undated) DEVICE — ANES EXTENSION SET 90IN-LF: Brand: MEDLINE INDUSTRIES, INC.

## (undated) DEVICE — SUTURE VCRL SZ 3-0 L18IN ABSRB UD L26MM SH 1/2 CIR J864D

## (undated) DEVICE — PRESSURE MONITORING SET: Brand: TRUWAVE, VAMP PLUS

## (undated) DEVICE — SPONGE GZ W4XL8IN COT WVN 12 PLY

## (undated) DEVICE — GLOVE SURG SZ 65 L12IN FNGR THK79MIL GRN LTX FREE

## (undated) DEVICE — SOLUTION IRRIG 250ML STRL H2O PLAS POUR BTL USP

## (undated) DEVICE — NEEDLE HYPO 30GA L0.5IN BGE POLYPR HUB S STL REG BVL STR

## (undated) DEVICE — COVER,MAYO STAND,XL,STERILE: Brand: MEDLINE

## (undated) DEVICE — SYRINGE CATH TIP 50ML

## (undated) DEVICE — GARMENT,MEDLINE,DVT,INT,CALF,MED, GEN2: Brand: MEDLINE

## (undated) DEVICE — BLANKET WRM W40.2XL55.9IN IORT LO BODY + MISTRAL AIR

## (undated) DEVICE — BAG DRNGE 9OZ L9.5IN BILE W/ ADPT TWO ADJ RUB BELT DISP FOR

## (undated) DEVICE — 3M™ TEGADERM™ TRANSPARENT FILM DRESSING FRAME STYLE, 1624W, 2-3/8 IN X 2-3/4 IN (6 CM X 7 CM), 100/CT 4CT/CASE: Brand: 3M™ TEGADERM™

## (undated) DEVICE — CODMAN® HAKIM® PROGRAMMABLE VALVE IN-LINE VALVE WITH SIPHONGUARD® DEVICE PROGRAMMABLE IN STEPS OF 10MM H2O (98 PA): 30MM H2O TO 200MM H2O (294 PA - 1960 PA): Brand: CODMAN® HAKIM®

## (undated) DEVICE — PRECISION SPECIMEN CONTAINER 4 OZ (118 ML) • POSITIVE SEAL INDICATOR OR PACKAGED: Brand: PRECISION

## (undated) DEVICE — SURE SET-DOUBLE BASIN-LF: Brand: MEDLINE INDUSTRIES, INC.

## (undated) DEVICE — SYRINGE MED 3ML CLR PLAS STD N CTRL LUERLOCK TIP DISP

## (undated) DEVICE — DRESSING FOAM DISK DIA1IN H 7MM HYDRPHLC CHG IMPREG IN SL

## (undated) DEVICE — SET BLOOD COLL SFTY NDLE 23GA L0.75N TBNG L12IN LIGHT BLUE W

## (undated) DEVICE — 3M™ STERI-STRIP™ REINFORCED ADHESIVE SKIN CLOSURES, R1548, 1 IN X 5 IN (25 MM X 125 MM), 4 STRIPS/ENVELOPE: Brand: 3M™ STERI-STRIP™

## (undated) DEVICE — DRESSING GERM DIA1IN CNTR H DIA7MM BLU CHG ANTIMIC PROTCT

## (undated) DEVICE — DRAIN SURG FLAT W7MMXL20CM FULL PERF

## (undated) DEVICE — SUTURE DEKNATEL POLYDEK 4-0 L18IN NONABSORBABLE GRN ME-2 6692

## (undated) DEVICE — SUTURE PLN GUT SZ 6-0 L18IN ABSRB YELLOWISH TAN L11MM G-1 774G

## (undated) DEVICE — APPLICATOR MEDICATED 26 CC SOLUTION HI LT ORNG CHLORAPREP

## (undated) DEVICE — COVER LT HNDL CAM BLU DISP W/ SURG CTRL

## (undated) DEVICE — DRESSING,GAUZE,XEROFORM,CURAD,1"X8",ST: Brand: CURAD

## (undated) DEVICE — Z DISCONTINUED USE 2131664 WIPE INSTR W3XL3IN NONLINTING

## (undated) DEVICE — PAD,NON-ADHERENT,3X8,STERILE,LF,1/PK: Brand: MEDLINE

## (undated) DEVICE — SURGICAL SET UP - SURE SET: Brand: MEDLINE INDUSTRIES, INC.

## (undated) DEVICE — HOLDER RESTRAINT LIMB UNIV FOAM PR D RNG SINGLE STRP LF

## (undated) DEVICE — GLOVE SURG SZ 75 L12IN FNGR THK94MIL TRNSLUC YEL LTX

## (undated) DEVICE — APPLICATOR,COTTON-TIP,WOOD,3,STRL: Brand: MEDLINE

## (undated) DEVICE — SUTURE MCRYL SZ 4-0 L27IN ABSRB UD L19MM PS-2 1/2 CIR PRIM Y426H

## (undated) DEVICE — CABLE BPLR L12FT FLYING LD DISPOSABLE

## (undated) DEVICE — SYRINGE MED 10ML TRNSLUC BRL PLUNG BLK MRK POLYPR CTRL

## (undated) DEVICE — MARKER,SKIN,WI/RULER AND LABELS: Brand: MEDLINE

## (undated) DEVICE — JEWISH HOSPITAL TURNOVER KIT: Brand: MEDLINE INDUSTRIES, INC.

## (undated) DEVICE — ENT I-LF: Brand: MEDLINE INDUSTRIES, INC.

## (undated) DEVICE — SUTURE NONABSORBABLE MONOFILAMENT 5-0 M1 P-3 18 IN PROLENE 8698H

## (undated) DEVICE — TOOL 9BA60 LEGEND 9CM 6MM BA: Brand: MIDAS REX

## (undated) DEVICE — 6 ML SYRINGE LUER-LOCK TIP: Brand: MONOJECT

## (undated) DEVICE — COVER LT HNDL BLU PLAS

## (undated) DEVICE — SUTURE VCRL SZ 6-0 L18IN ABSRB VLT S-14 L8MM SPAT REV CUT J570G

## (undated) DEVICE — SUTURE MILD CHROM GUT 6-0 L18IN ABSRB C-1 L12MM 3/8 CIR REV G3810